# Patient Record
Sex: FEMALE | Race: WHITE | NOT HISPANIC OR LATINO | Employment: UNEMPLOYED | URBAN - METROPOLITAN AREA
[De-identification: names, ages, dates, MRNs, and addresses within clinical notes are randomized per-mention and may not be internally consistent; named-entity substitution may affect disease eponyms.]

---

## 2019-03-11 RX ORDER — POTASSIUM CHLORIDE 750 MG/1
TABLET, EXTENDED RELEASE ORAL
Qty: 30 TABLET | Refills: 0 | OUTPATIENT
Start: 2019-03-11

## 2019-05-28 ENCOUNTER — OFFICE VISIT (OUTPATIENT)
Dept: OBSTETRICS AND GYNECOLOGY | Facility: CLINIC | Age: 34
End: 2019-05-28
Payer: MEDICAID

## 2019-05-28 VITALS
HEART RATE: 57 BPM | HEIGHT: 64 IN | SYSTOLIC BLOOD PRESSURE: 136 MMHG | WEIGHT: 115 LBS | BODY MASS INDEX: 19.63 KG/M2 | DIASTOLIC BLOOD PRESSURE: 84 MMHG

## 2019-05-28 DIAGNOSIS — Z01.419 WOMEN'S ANNUAL ROUTINE GYNECOLOGICAL EXAMINATION: ICD-10-CM

## 2019-05-28 DIAGNOSIS — N83.209 CYST OF OVARY, UNSPECIFIED LATERALITY: Primary | ICD-10-CM

## 2019-05-28 DIAGNOSIS — R10.9 ABDOMINAL PAIN, UNSPECIFIED ABDOMINAL LOCATION: ICD-10-CM

## 2019-05-28 PROCEDURE — 99204 OFFICE O/P NEW MOD 45 MIN: CPT | Mod: S$GLB,,, | Performed by: OBSTETRICS & GYNECOLOGY

## 2019-05-28 PROCEDURE — 99204 PR OFFICE/OUTPT VISIT, NEW, LEVL IV, 45-59 MIN: ICD-10-PCS | Mod: S$GLB,,, | Performed by: OBSTETRICS & GYNECOLOGY

## 2019-05-28 RX ORDER — BUSPIRONE HYDROCHLORIDE 15 MG/1
TABLET ORAL
COMMUNITY
End: 2020-12-23

## 2019-05-28 RX ORDER — DEXTROAMPHETAMINE SACCHARATE, AMPHETAMINE ASPARTATE, DEXTROAMPHETAMINE SULFATE AND AMPHETAMINE SULFATE 7.5; 7.5; 7.5; 7.5 MG/1; MG/1; MG/1; MG/1
TABLET ORAL
COMMUNITY
End: 2021-01-21

## 2019-05-28 NOTE — PROGRESS NOTES
Subjective:       Patient ID: Sari Fernandez is a 33 y.o. female.    Chief Complaint:  Pelvic Pain      History of Present Illness    Seen in ER for abd pain.  She thought it was kidney stone again.  8 cm cyst on CT- complex.  No other complaitns.  Has not been to GYN in some time      GYN & OB History  No LMP recorded (lmp unknown). (Menstrual status: Other).   Date of Last Pap: No result found    OB History    Para Term  AB Living   1         1   SAB TAB Ectopic Multiple Live Births                  # Outcome Date GA Lbr Leonard/2nd Weight Sex Delivery Anes PTL Lv   1       CS-LTranv          Review of Systems  Review of Systems   Constitutional: Negative for chills and fever.   Respiratory: Negative for shortness of breath.    Cardiovascular: Negative for chest pain.   Gastrointestinal: Positive for abdominal pain. Negative for blood in stool, constipation, diarrhea, nausea, vomiting and reflux.   Genitourinary: Negative for dysmenorrhea, dyspareunia, dysuria, hematuria, hot flashes, menorrhagia, menstrual problem, pelvic pain, vaginal bleeding, vaginal discharge, postcoital bleeding and vaginal dryness.   Musculoskeletal: Negative for arthralgias and joint swelling.   Integumentary:  Negative for rash and hair changes.   Psychiatric/Behavioral: Negative for depression. The patient is not nervous/anxious.            Objective:    Physical Exam:   Constitutional: She appears well-developed and well-nourished. No distress.    HENT:   Head: Normocephalic and atraumatic.    Eyes: Conjunctivae and EOM are normal.    Neck: No tracheal deviation present. No thyromegaly present.    Cardiovascular: Exam reveals no clubbing, no cyanosis and no edema.     Pulmonary/Chest: Effort normal. No respiratory distress.        Abdominal: Soft. She exhibits no distension and no mass. There is no tenderness. There is no rebound and no guarding. No hernia.     Genitourinary: Rectum normal, vagina normal and uterus  normal. Pelvic exam was performed with patient supine. There is no rash, tenderness, lesion or injury on the right labia. There is no rash, tenderness, lesion or injury on the left labia. Cervix is normal. Right adnexum displays mass and fullness. Right adnexum displays no tenderness. Left adnexum displays no mass, no tenderness and no fullness.                Skin: She is not diaphoretic. No cyanosis. Nails show no clubbing.           Assessment:        1. Cyst of ovary, unspecified laterality    2. Abdominal pain, unspecified abdominal location               Plan:      US asap

## 2019-05-30 ENCOUNTER — PROCEDURE VISIT (OUTPATIENT)
Dept: OBSTETRICS AND GYNECOLOGY | Facility: CLINIC | Age: 34
End: 2019-05-30
Payer: MEDICAID

## 2019-05-30 DIAGNOSIS — N83.209 CYST OF OVARY, UNSPECIFIED LATERALITY: Primary | ICD-10-CM

## 2019-05-30 DIAGNOSIS — R10.30 LOWER ABDOMINAL PAIN: Primary | ICD-10-CM

## 2019-05-30 PROCEDURE — 76830 TRANSVAGINAL US NON-OB: CPT | Mod: S$GLB,,, | Performed by: OBSTETRICS & GYNECOLOGY

## 2019-05-30 PROCEDURE — 76830 PR  ECHOGRAPHY,TRANSVAGINAL: ICD-10-PCS | Mod: S$GLB,,, | Performed by: OBSTETRICS & GYNECOLOGY

## 2019-06-03 LAB
CLINICAL INFO: NORMAL
COMMENT: NORMAL
CYTO CVX: NORMAL
CYTOLOGIST CVX/VAG CYTO: NORMAL
CYTOLOGY CMNT CVX/VAG CYTO-IMP: NORMAL
DATE OF PREVIOUS PAP: NORMAL
DATE PREVIOUS BX: NORMAL
LMP START DATE: NORMAL
SPECIMEN SOURCE CVX/VAG CYTO: NORMAL
STAT OF ADQ CVX/VAG CYTO-IMP: NORMAL

## 2020-03-27 ENCOUNTER — HOSPITAL ENCOUNTER (OUTPATIENT)
Dept: RADIOLOGY | Facility: CLINIC | Age: 35
Discharge: HOME | End: 2020-03-27
Attending: UROLOGY
Payer: MEDICAID

## 2020-03-27 ENCOUNTER — OFFICE VISIT (OUTPATIENT)
Dept: UROLOGY | Facility: CLINIC | Age: 35
End: 2020-03-27
Payer: MEDICAID

## 2020-03-27 VITALS
RESPIRATION RATE: 18 BRPM | HEART RATE: 104 BPM | SYSTOLIC BLOOD PRESSURE: 125 MMHG | WEIGHT: 115 LBS | DIASTOLIC BLOOD PRESSURE: 74 MMHG | HEIGHT: 64 IN | BODY MASS INDEX: 19.63 KG/M2

## 2020-03-27 DIAGNOSIS — N20.0 RENAL STONE: Primary | ICD-10-CM

## 2020-03-27 DIAGNOSIS — N20.0 RENAL STONE: ICD-10-CM

## 2020-03-27 DIAGNOSIS — N39.0 URINARY TRACT INFECTION WITHOUT HEMATURIA, SITE UNSPECIFIED: ICD-10-CM

## 2020-03-27 LAB
BILIRUB UR QL STRIP: NEGATIVE
GLUCOSE UR QL STRIP: NEGATIVE
KETONES UR QL STRIP: NEGATIVE
LEUKOCYTE ESTERASE UR QL STRIP: POSITIVE
PH, POC UA: 6.5
POC AMORP, URINE: ABNORMAL
POC BACTI, URINE: ABNORMAL
POC BLOOD, URINE: POSITIVE
POC CASTS, URINE: ABNORMAL
POC CRYST, URINE: ABNORMAL
POC EPITH, URINE: ABNORMAL
POC HCG, URINE: ABNORMAL
POC HYALIN, URINE: ABNORMAL
POC MUCUS, URINE: ABNORMAL
POC NITRATES, URINE: NEGATIVE
POC OTHER, URINE: ABNORMAL
POC RBC, URINE: ABNORMAL HPF
POC WBC, URINE: ABNORMAL HPF
PROT UR QL STRIP: POSITIVE
SP GR UR STRIP: 1.02 (ref 1–1.03)
UROBILINOGEN UR STRIP-ACNC: 0.2 (ref 0.1–1.1)

## 2020-03-27 PROCEDURE — 74018 RADEX ABDOMEN 1 VIEW: CPT | Mod: 26,,, | Performed by: RADIOLOGY

## 2020-03-27 PROCEDURE — 99214 OFFICE O/P EST MOD 30 MIN: CPT | Mod: 25,S$GLB,, | Performed by: NURSE PRACTITIONER

## 2020-03-27 PROCEDURE — 74018 RADEX ABDOMEN 1 VIEW: CPT | Mod: TC,,, | Performed by: UROLOGY

## 2020-03-27 PROCEDURE — 99214 PR OFFICE/OUTPT VISIT, EST, LEVL IV, 30-39 MIN: ICD-10-PCS | Mod: 25,S$GLB,, | Performed by: NURSE PRACTITIONER

## 2020-03-27 PROCEDURE — 74018 XR ABDOMEN AP 1 VIEW: ICD-10-PCS | Mod: TC,,, | Performed by: UROLOGY

## 2020-03-27 PROCEDURE — 74018 XR ABDOMEN AP 1 VIEW: ICD-10-PCS | Mod: 26,,, | Performed by: RADIOLOGY

## 2020-03-27 RX ORDER — NITROFURANTOIN (MACROCRYSTALS) 100 MG/1
100 CAPSULE ORAL EVERY 12 HOURS
Qty: 14 CAPSULE | Refills: 0 | Status: SHIPPED | OUTPATIENT
Start: 2020-03-27 | End: 2020-04-03

## 2020-03-27 RX ORDER — ALPRAZOLAM 1 MG/1
TABLET ORAL
COMMUNITY
Start: 2020-03-09 | End: 2021-01-21

## 2020-03-27 NOTE — PROGRESS NOTES
Subjective:       Patient ID: Sari Fernandez is a 34 y.o. female.    Chief Complaint: Nephrolithiasis and Other (History recurrent Urinary Tract Infections)      HPI: 34-year-old female known to the service Dr. Johanny obrien with a history of recurring urinary tract infections and kidney stones.  Follow-up today from her most previous office visit 2019.  Today she reports some low back discomfort and nocturia x3 last p.m..  She states he is usually the symptoms of either stone moving or urinary tract infection.  She has been afebrile.  She denies dysuria, gross hematuria or flank pain.  No constipation.       Past Medical History:   Past Medical History:   Diagnosis Date    ADHD     ADHD     Anemia     Anxiety     Anxiety     Radha        Past Surgical Historical:   Past Surgical History:   Procedure Laterality Date     SECTION, LOW TRANSVERSE      KIDNEY STONE SURGERY      KIDNEY SURGERY      stint        Medications:   Medication List with Changes/Refills   Current Medications    ALPRAZOLAM (XANAX) 1 MG TABLET    TK 1 T PO Q 6 H PRA    BUSPIRONE (BUSPAR) 15 MG TABLET    buspirone 15 mg tablet   TAKE 1 TABLET BY MOUTH TWICE DAILY    DEXTROAMPHETAMINE-AMPHETAMINE (ADDERALL) 30 MG TAB    Adderall 30 mg tablet   Take 1 tablet twice a day by oral route.        Past Social History:   Social History     Socioeconomic History    Marital status: Single     Spouse name: Not on file    Number of children: Not on file    Years of education: Not on file    Highest education level: Not on file   Occupational History    Not on file   Social Needs    Financial resource strain: Not on file    Food insecurity:     Worry: Not on file     Inability: Not on file    Transportation needs:     Medical: Not on file     Non-medical: Not on file   Tobacco Use    Smoking status: Current Some Day Smoker     Types: Cigarettes   Substance and Sexual Activity    Alcohol use: Yes    Drug use: Not Currently     Sexual activity: Yes     Partners: Male   Lifestyle    Physical activity:     Days per week: Not on file     Minutes per session: Not on file    Stress: Not on file   Relationships    Social connections:     Talks on phone: Not on file     Gets together: Not on file     Attends [employer] service: Not on file     Active member of club or organization: Not on file     Attends meetings of clubs or organizations: Not on file     Relationship status: Not on file   Other Topics Concern    Not on file   Social History Narrative    Not on file       Allergies: Review of patient's allergies indicates:  No Known Allergies     Family History:   Family History   Problem Relation Age of Onset    Cancer Paternal Grandmother     Cancer Father         Review of Systems:  Review of Systems   Constitutional: Negative for activity change and appetite change.   HENT: Negative for congestion and dental problem.    Respiratory: Negative for chest tightness and shortness of breath.    Cardiovascular: Negative for chest pain.   Gastrointestinal: Negative for abdominal distention and abdominal pain.   Genitourinary: Positive for frequency (Nocturia x3 last p.m.). Negative for decreased urine volume, difficulty urinating, dyspareunia, dysuria, enuresis, flank pain, genital sores, hematuria, pelvic pain and urgency.   Musculoskeletal: Positive for back pain. Negative for neck pain.   Allergic/Immunologic: Negative for immunocompromised state.   Neurological: Negative for dizziness.   Hematological: Negative for adenopathy.   Psychiatric/Behavioral: Negative for agitation, behavioral problems and confusion.       Physical Exam:  Physical Exam   Constitutional: She is oriented to person, place, and time. She appears well-developed and well-nourished.   HENT:   Head: Normocephalic and atraumatic.   Eyes: No scleral icterus.   Neck: Normal range of motion.   Cardiovascular: Intact distal pulses.    Pulmonary/Chest: Effort normal and breath  sounds normal.   Abdominal: Soft. She exhibits no distension. There is no tenderness.   Genitourinary: Rectum normal and vagina normal. Pelvic exam was performed with patient supine. There is no rash, tenderness or lesion on the right labia. There is no rash, tenderness or lesion on the left labia.   Musculoskeletal: She exhibits no edema.   Neurological: She is alert and oriented to person, place, and time.   Skin: Skin is warm and dry.     Psychiatric: She has a normal mood and affect.       Assessment/Plan:   Low back pain--urinalysis WBC 50-60 RBC 2-3 1+ bacteria, nitrite negative, trace of protein.  Culture urine.  We are weekend, start Macrobid pending culture results and sensitivities Monday next week.  Bilateral renal calculi--by history, KUB-- no obvious stones are seen, but we have a large volume of stool and bowel gas overlying the structures making visualization difficult.  She was post do a 24 hr urine in the past a was never explained her so therefore she did not complete but would like to do so at this time.  Therefore 24 hr urine stone risk analysis ordered      Problem List Items Addressed This Visit     None      Visit Diagnoses     Renal stone    -  Primary    Relevant Orders    POCT Urinalysis (w/Micro Option)    X-Ray Abdomen AP 1 View

## 2020-03-29 LAB — URINE CULTURE, ROUTINE: NORMAL

## 2020-04-03 ENCOUNTER — OFFICE VISIT (OUTPATIENT)
Dept: UROLOGY | Facility: CLINIC | Age: 35
End: 2020-04-03
Payer: MEDICAID

## 2020-04-03 VITALS
HEIGHT: 64 IN | WEIGHT: 115 LBS | DIASTOLIC BLOOD PRESSURE: 66 MMHG | SYSTOLIC BLOOD PRESSURE: 112 MMHG | RESPIRATION RATE: 18 BRPM | BODY MASS INDEX: 19.63 KG/M2 | HEART RATE: 105 BPM

## 2020-04-03 DIAGNOSIS — N39.0 URINARY TRACT INFECTION WITHOUT HEMATURIA, SITE UNSPECIFIED: Primary | ICD-10-CM

## 2020-04-03 LAB
BILIRUB UR QL STRIP: NEGATIVE
GLUCOSE UR QL STRIP: NEGATIVE
KETONES UR QL STRIP: POSITIVE
LEUKOCYTE ESTERASE UR QL STRIP: POSITIVE
PH, POC UA: 6.5
POC AMORP, URINE: ABNORMAL
POC BACTI, URINE: ABNORMAL
POC BLOOD, URINE: POSITIVE
POC CASTS, URINE: ABNORMAL
POC CRYST, URINE: ABNORMAL
POC EPITH, URINE: ABNORMAL
POC HCG, URINE: ABNORMAL
POC HYALIN, URINE: ABNORMAL LPF
POC MUCUS, URINE: ABNORMAL
POC NITRATES, URINE: NEGATIVE
POC OTHER, URINE: ABNORMAL
POC RBC, URINE: ABNORMAL HPF
POC WBC, URINE: ABNORMAL HPF
PROT UR QL STRIP: POSITIVE
SP GR UR STRIP: 1.02 (ref 1–1.03)
UROBILINOGEN UR STRIP-ACNC: 0.2 (ref 0.1–1.1)

## 2020-04-03 PROCEDURE — 99214 PR OFFICE/OUTPT VISIT, EST, LEVL IV, 30-39 MIN: ICD-10-PCS | Mod: S$GLB,,, | Performed by: UROLOGY

## 2020-04-03 PROCEDURE — 51701 INSERT BLADDER CATHETER: CPT | Mod: S$GLB,,, | Performed by: NURSE PRACTITIONER

## 2020-04-03 PROCEDURE — 51701 PR INSERTION OF NON-INDWELLING BLADDER CATHETERIZATION FOR RESIDUAL UR: ICD-10-PCS | Mod: S$GLB,,, | Performed by: NURSE PRACTITIONER

## 2020-04-03 PROCEDURE — 99214 OFFICE O/P EST MOD 30 MIN: CPT | Mod: S$GLB,,, | Performed by: UROLOGY

## 2020-04-03 RX ORDER — MELOXICAM 7.5 MG/1
7.5 TABLET ORAL DAILY
Qty: 5 TABLET | Refills: 0 | Status: SHIPPED | OUTPATIENT
Start: 2020-04-03 | End: 2020-04-08

## 2020-04-03 NOTE — PROGRESS NOTES
Subjective:       Patient ID: Sari Fernandez is a 34 y.o. female.    Chief Complaint: Other (c/o low back pain and left side)      HPI: 34-year-old female known service Dr. Johanny obrien returns to clinic she was seen recently diagnosed urinary tract infection E coli 25,000, any count and treated based on sensitivities.  She returns today complaining of left-sided pain from the shoulder down through the muscles of her lower back ( reached out to PCP, no response).  She states she strained moving an object and suspect she pulled a muscle, but became concerned that it may be something with her kidney.  She completed her antibiotics as prescribed, she has has no voiding symptoms and denies left flank pain.  Her pain is aggravated with movement.       Past Medical History:   Past Medical History:   Diagnosis Date    ADHD     ADHD     Anemia     Anxiety     Anxiety     Radha     Urinary tract infection        Past Surgical Historical:   Past Surgical History:   Procedure Laterality Date     SECTION, LOW TRANSVERSE      KIDNEY STONE SURGERY      KIDNEY SURGERY      stint        Medications:   Medication List with Changes/Refills   Current Medications    ALPRAZOLAM (XANAX) 1 MG TABLET    TK 1 T PO Q 6 H PRA    BUSPIRONE (BUSPAR) 15 MG TABLET    buspirone 15 mg tablet   TAKE 1 TABLET BY MOUTH TWICE DAILY    DEXTROAMPHETAMINE-AMPHETAMINE (ADDERALL) 30 MG TAB    Adderall 30 mg tablet   Take 1 tablet twice a day by oral route.    NITROFURANTOIN (MACRODANTIN) 100 MG CAPSULE    Take 1 capsule (100 mg total) by mouth every 12 (twelve) hours. for 7 days        Past Social History:   Social History     Socioeconomic History    Marital status: Single     Spouse name: Not on file    Number of children: Not on file    Years of education: Not on file    Highest education level: Not on file   Occupational History    Not on file   Social Needs    Financial resource strain: Not on file    Food insecurity:      Worry: Not on file     Inability: Not on file    Transportation needs:     Medical: Not on file     Non-medical: Not on file   Tobacco Use    Smoking status: Current Some Day Smoker     Types: Cigarettes    Smokeless tobacco: Never Used   Substance and Sexual Activity    Alcohol use: Yes    Drug use: Not Currently    Sexual activity: Yes     Partners: Male   Lifestyle    Physical activity:     Days per week: Not on file     Minutes per session: Not on file    Stress: Not on file   Relationships    Social connections:     Talks on phone: Not on file     Gets together: Not on file     Attends Voodoo service: Not on file     Active member of club or organization: Not on file     Attends meetings of clubs or organizations: Not on file     Relationship status: Not on file   Other Topics Concern    Not on file   Social History Narrative    Not on file       Allergies: Review of patient's allergies indicates:  No Known Allergies     Family History:   Family History   Problem Relation Age of Onset    Cancer Paternal Grandmother     Cancer Father         Review of Systems:  Review of Systems   Constitutional: Negative for activity change and appetite change.   HENT: Negative for congestion and dental problem.    Respiratory: Negative for chest tightness and shortness of breath.    Cardiovascular: Negative for chest pain.   Gastrointestinal: Negative for abdominal distention and abdominal pain.   Genitourinary: Negative for decreased urine volume, difficulty urinating, dyspareunia, dysuria, enuresis, flank pain, frequency, genital sores, hematuria, pelvic pain and urgency.   Musculoskeletal: Negative for back pain and neck pain.   Allergic/Immunologic: Negative for immunocompromised state.   Neurological: Negative for dizziness.   Hematological: Negative for adenopathy.   Psychiatric/Behavioral: Negative for agitation, behavioral problems and confusion.       Physical Exam:  Physical Exam   Constitutional: She  is oriented to person, place, and time. She appears well-developed and well-nourished.   HENT:   Head: Normocephalic and atraumatic.   Eyes: No scleral icterus.   Neck: Normal range of motion.   Cardiovascular: Intact distal pulses.    Pulmonary/Chest: Effort normal and breath sounds normal.   Abdominal: Soft. She exhibits no distension. There is no tenderness.   Genitourinary: Rectum normal and vagina normal. Pelvic exam was performed with patient supine. There is no rash, tenderness or lesion on the right labia. There is no rash, tenderness or lesion on the left labia.   Musculoskeletal: She exhibits no edema.        Arms:  Paraspinous point muscle tenderness thoracic and lumbar (recreates pain she complains of)   Neurological: She is alert and oriented to person, place, and time.   Skin: Skin is warm and dry.     Psychiatric: She has a normal mood and affect.       Assessment/Plan:   UTI-positive urine culture E coli previous visit, voided urine today WBC 50-75, 0-3 RBCs, 1+ epithelial and 2+ bacteria; cath UA WBC 25-50, 3-5 red cells per high-power field, 1+ epithelials, 2+ bacteria nitrite negative (patient is on menses); no voiding symptoms will culture urine and treat if indicated    Paraspinous muscle strain--Mobic 7.5 mg, #5., 1 p.o. daily with meal, no refill follow-up with PCP as needed if not better or worsening  Problem List Items Addressed This Visit     None      Visit Diagnoses     Urinary tract infection without hematuria, site unspecified    -  Primary    Relevant Orders    POCT Urinalysis (w/Micro Option)

## 2020-04-05 LAB — URINE CULTURE, ROUTINE: NORMAL

## 2020-04-06 ENCOUNTER — TELEPHONE (OUTPATIENT)
Dept: UROLOGY | Facility: CLINIC | Age: 35
End: 2020-04-06

## 2020-04-06 NOTE — TELEPHONE ENCOUNTER
----- Message from Miguelangel Gallego NP sent at 4/6/2020  8:50 AM CDT -----  Please notify patient of lab results within stable range

## 2020-06-17 ENCOUNTER — OFFICE VISIT (OUTPATIENT)
Dept: UROLOGY | Facility: CLINIC | Age: 35
End: 2020-06-17
Payer: MEDICAID

## 2020-06-17 VITALS
DIASTOLIC BLOOD PRESSURE: 89 MMHG | SYSTOLIC BLOOD PRESSURE: 150 MMHG | HEIGHT: 64 IN | BODY MASS INDEX: 21.34 KG/M2 | WEIGHT: 125 LBS | OXYGEN SATURATION: 100 % | RESPIRATION RATE: 20 BRPM | HEART RATE: 89 BPM

## 2020-06-17 DIAGNOSIS — N39.0 FREQUENT UTI: ICD-10-CM

## 2020-06-17 DIAGNOSIS — N20.0 KIDNEY STONE: Primary | ICD-10-CM

## 2020-06-17 LAB
BILIRUB UR QL STRIP: NEGATIVE
CLARITY, POC UA: CLEAR
COLOR, POC UA: YELLOW
GLUCOSE UR QL STRIP: NEGATIVE
KETONES UR QL STRIP: NEGATIVE
LEUKOCYTE ESTERASE UR QL STRIP: POSITIVE
NITRITE, POC UA: ABNORMAL
PH, POC UA: 6
POC AMORP, URINE: ABNORMAL
POC BACTI, URINE: ABNORMAL
POC BLOOD, URINE: POSITIVE
POC CASTS, URINE: ABNORMAL
POC CRYST, URINE: ABNORMAL
POC EPITH, URINE: ABNORMAL
POC HCG, URINE: ABNORMAL
POC HYALIN, URINE: ABNORMAL LPF
POC MUCUS, URINE: ABNORMAL
POC NITRATES, URINE: NEGATIVE
POC OTHER, URINE: ABNORMAL
POC RBC, URINE: ABNORMAL HPF
POC WBC, URINE: ABNORMAL HPF
PROT UR QL STRIP: POSITIVE
SP GR UR STRIP: 1.02 (ref 1–1.03)
UROBILINOGEN UR STRIP-ACNC: 0.2 (ref 0.1–1.1)

## 2020-06-17 PROCEDURE — 99213 OFFICE O/P EST LOW 20 MIN: CPT | Mod: S$GLB,,, | Performed by: UROLOGY

## 2020-06-17 PROCEDURE — 99213 PR OFFICE/OUTPT VISIT, EST, LEVL III, 20-29 MIN: ICD-10-PCS | Mod: S$GLB,,, | Performed by: UROLOGY

## 2020-06-17 NOTE — PROGRESS NOTES
Subjective:       Patient ID: Sari Fernandez is a 34 y.o. female.    Chief Complaint: Follow-up (6 week follow up for uti pt states she have a hx of stone and have been having back pain last couple days)      HPI: 34-year-old female, patient Dr. Orlando, presents for 6 week evaluation.  Patient has a history of kidney stones.  Patient was to do a 24 hr urine.  However patient states she had lost paperwork.  Patient also has a history of frequent UTIs.  She was treated for urinary tract infection with E coli approximately 3 months ago.  Last urine culture 2 months ago showed no growth.    At this time patient denies any pain or burning urination.  Denies any difficulty voiding.  Denies any flank pain.  Denies any blood in urine.  States he has a good stream.  Denies any odor to her urine.  Denies any fever.  No other urinary complaints.  All other health problems appear stable at this time.       Past Medical History:   Past Medical History:   Diagnosis Date    ADHD     ADHD     Anemia     Anxiety     Anxiety     Radha     Urinary tract infection        Past Surgical Historical:   Past Surgical History:   Procedure Laterality Date     SECTION, LOW TRANSVERSE      KIDNEY STONE SURGERY      KIDNEY SURGERY      stint        Medications:   Medication List with Changes/Refills   Current Medications    ALPRAZOLAM (XANAX) 1 MG TABLET    TK 1 T PO Q 6 H PRA    BUSPIRONE (BUSPAR) 15 MG TABLET    buspirone 15 mg tablet   TAKE 1 TABLET BY MOUTH TWICE DAILY    DEXTROAMPHETAMINE-AMPHETAMINE (ADDERALL) 30 MG TAB    Adderall 30 mg tablet   Take 1 tablet twice a day by oral route.        Past Social History:   Social History     Socioeconomic History    Marital status: Single     Spouse name: Not on file    Number of children: Not on file    Years of education: Not on file    Highest education level: Not on file   Occupational History    Not on file   Social Needs    Financial resource strain: Not on file     Food insecurity     Worry: Not on file     Inability: Not on file    Transportation needs     Medical: Not on file     Non-medical: Not on file   Tobacco Use    Smoking status: Current Some Day Smoker     Types: Cigarettes    Smokeless tobacco: Never Used   Substance and Sexual Activity    Alcohol use: Yes    Drug use: Not Currently    Sexual activity: Yes     Partners: Male   Lifestyle    Physical activity     Days per week: Not on file     Minutes per session: Not on file    Stress: Not on file   Relationships    Social connections     Talks on phone: Not on file     Gets together: Not on file     Attends [employer] service: Not on file     Active member of club or organization: Not on file     Attends meetings of clubs or organizations: Not on file     Relationship status: Not on file   Other Topics Concern    Not on file   Social History Narrative    Not on file       Allergies: Review of patient's allergies indicates:  No Known Allergies     Family History:   Family History   Problem Relation Age of Onset    Cancer Paternal Grandmother     Cancer Father         Review of Systems:  Review of Systems   Constitutional: Negative for activity change and appetite change.   HENT: Negative for congestion and dental problem.    Respiratory: Negative for chest tightness and shortness of breath.    Cardiovascular: Negative for chest pain.   Gastrointestinal: Negative for abdominal distention.   Genitourinary: Negative for decreased urine volume, difficulty urinating, dyspareunia, dysuria, enuresis, flank pain, frequency, genital sores, hematuria, pelvic pain and urgency.   Musculoskeletal: Negative for back pain and neck pain.   Allergic/Immunologic: Negative for immunocompromised state.   Neurological: Negative for dizziness.   Hematological: Negative for adenopathy.   Psychiatric/Behavioral: Negative for agitation, behavioral problems and confusion.       Physical Exam:  Physical Exam   Nursing note and  vitals reviewed.  Constitutional: She is oriented to person, place, and time. She appears well-developed.   HENT:   Head: Normocephalic.   Cardiovascular: Normal rate, regular rhythm and normal heart sounds.    Pulmonary/Chest: Effort normal and breath sounds normal.   Abdominal: Soft. Bowel sounds are normal.   Neurological: She is alert and oriented to person, place, and time.   Skin: Skin is warm and dry.      Urinalysis:  Trace leukocytes, white blood cells 0-5, epithelial +1, bacteria +1.  Trace intact blood, red blood cell 6 in 10.    Assessment/Plan:   1.  Kidney stones:  Patient is doing at this time.  No complaints.  Will reorder patient's 24 hr urinalysis.    2.  Frequent UTIs:  Patient is asymptomatic this time.  Her urinalysis relatively normal.    Plan follow-up in approximately 6 weeks, sooner if needed.  Problem List Items Addressed This Visit     None      Visit Diagnoses     Kidney stone    -  Primary    Frequent UTI        Relevant Orders    POCT Urinalysis (w/Micro Option)

## 2020-06-24 ENCOUNTER — TELEPHONE (OUTPATIENT)
Dept: UROLOGY | Facility: CLINIC | Age: 35
End: 2020-06-24

## 2020-06-24 NOTE — TELEPHONE ENCOUNTER
----- Message from Caitlin Wilde sent at 6/24/2020  2:38 PM CDT -----  Regarding: pt  Pt would like to consult with nurse in regards to scheduling an appt. Pt states that she feels she need to be seen today or tomorrow. Please call back at [phone] or [phone]

## 2020-06-26 ENCOUNTER — HOSPITAL ENCOUNTER (OUTPATIENT)
Dept: RADIOLOGY | Facility: CLINIC | Age: 35
Discharge: HOME | End: 2020-06-26
Attending: UROLOGY
Payer: MEDICAID

## 2020-06-26 ENCOUNTER — OFFICE VISIT (OUTPATIENT)
Dept: UROLOGY | Facility: CLINIC | Age: 35
End: 2020-06-26
Payer: MEDICAID

## 2020-06-26 DIAGNOSIS — N20.0 KIDNEY STONE: ICD-10-CM

## 2020-06-26 DIAGNOSIS — N20.0 KIDNEY STONE: Primary | ICD-10-CM

## 2020-06-26 LAB
BILIRUB UR QL STRIP: NEGATIVE
CLARITY, POC UA: ABNORMAL
COLOR, POC UA: ABNORMAL
GLUCOSE UR QL STRIP: NEGATIVE
KETONES UR QL STRIP: NEGATIVE
LEUKOCYTE ESTERASE UR QL STRIP: NEGATIVE
NITRITE, POC UA: ABNORMAL
PH, POC UA: 7
POC AMORP, URINE: ABNORMAL
POC BACTI, URINE: ABNORMAL
POC BLOOD, URINE: POSITIVE
POC CASTS, URINE: ABNORMAL
POC CRYST, URINE: ABNORMAL
POC EPITH, URINE: ABNORMAL
POC HCG, URINE: ABNORMAL
POC HYALIN, URINE: ABNORMAL LPF
POC MUCUS, URINE: ABNORMAL
POC NITRATES, URINE: NEGATIVE
POC OTHER, URINE: ABNORMAL
POC RBC, URINE: ABNORMAL HPF
POC WBC, URINE: ABNORMAL HPF
PROT UR QL STRIP: NEGATIVE
SP GR UR STRIP: 1.02 (ref 1–1.03)
UROBILINOGEN UR STRIP-ACNC: 0.2 (ref 0.1–1.1)

## 2020-06-26 PROCEDURE — 99214 PR OFFICE/OUTPT VISIT, EST, LEVL IV, 30-39 MIN: ICD-10-PCS | Mod: 25,S$GLB,, | Performed by: UROLOGY

## 2020-06-26 PROCEDURE — 74018 RADEX ABDOMEN 1 VIEW: CPT | Mod: TC,,, | Performed by: UROLOGY

## 2020-06-26 PROCEDURE — 99214 OFFICE O/P EST MOD 30 MIN: CPT | Mod: 25,S$GLB,, | Performed by: UROLOGY

## 2020-06-26 PROCEDURE — 74018 XR ABDOMEN AP 1 VIEW: ICD-10-PCS | Mod: 26,,, | Performed by: RADIOLOGY

## 2020-06-26 PROCEDURE — 74018 XR ABDOMEN AP 1 VIEW: ICD-10-PCS | Mod: TC,,, | Performed by: UROLOGY

## 2020-06-26 PROCEDURE — 74018 RADEX ABDOMEN 1 VIEW: CPT | Mod: 26,,, | Performed by: RADIOLOGY

## 2020-06-26 NOTE — PROGRESS NOTES
Subjective:       Patient ID: Sari Fernandez is a 34 y.o. female.    Chief Complaint: Flank Pain (right )      HPI: 33 yo female fu had another episode of renal colic which resolved 48 hrs ago.  Pt has a history of stones.  Pt has yet to do her 24 hr urine.  Pt denies any flank pain or hematuria       Past Medical History:   Past Medical History:   Diagnosis Date    ADHD     ADHD     Anemia     Anxiety     Anxiety     Radha     Urinary tract infection        Past Surgical Historical:   Past Surgical History:   Procedure Laterality Date     SECTION, LOW TRANSVERSE      KIDNEY STONE SURGERY      KIDNEY SURGERY      stint        Medications:   Medication List with Changes/Refills   Current Medications    ALPRAZOLAM (XANAX) 1 MG TABLET    TK 1 T PO Q 6 H PRA    BUSPIRONE (BUSPAR) 15 MG TABLET    buspirone 15 mg tablet   TAKE 1 TABLET BY MOUTH TWICE DAILY    DEXTROAMPHETAMINE-AMPHETAMINE (ADDERALL) 30 MG TAB    Adderall 30 mg tablet   Take 1 tablet twice a day by oral route.        Past Social History:   Social History     Socioeconomic History    Marital status: Single     Spouse name: Not on file    Number of children: Not on file    Years of education: Not on file    Highest education level: Not on file   Occupational History    Not on file   Social Needs    Financial resource strain: Not on file    Food insecurity     Worry: Not on file     Inability: Not on file    Transportation needs     Medical: Not on file     Non-medical: Not on file   Tobacco Use    Smoking status: Current Some Day Smoker     Types: Cigarettes    Smokeless tobacco: Never Used   Substance and Sexual Activity    Alcohol use: Yes    Drug use: Not Currently    Sexual activity: Yes     Partners: Male   Lifestyle    Physical activity     Days per week: Not on file     Minutes per session: Not on file    Stress: Not on file   Relationships    Social connections     Talks on phone: Not on file     Gets together:  Not on file     Attends [employer] service: Not on file     Active member of club or organization: Not on file     Attends meetings of clubs or organizations: Not on file     Relationship status: Not on file   Other Topics Concern    Not on file   Social History Narrative    Not on file       Allergies: Review of patient's allergies indicates:  No Known Allergies     Family History:   Family History   Problem Relation Age of Onset    Cancer Paternal Grandmother     Cancer Father         Review of Systems:  Review of Systems   Constitutional: Negative for activity change and appetite change.   HENT: Negative for congestion and dental problem.    Respiratory: Negative for chest tightness and shortness of breath.    Cardiovascular: Negative for chest pain.   Gastrointestinal: Negative for abdominal distention and abdominal pain.   Genitourinary: Negative for decreased urine volume, difficulty urinating, dyspareunia, dysuria, enuresis, flank pain, frequency, genital sores, hematuria, pelvic pain and urgency.   Musculoskeletal: Negative for back pain and neck pain.   Allergic/Immunologic: Negative for immunocompromised state.   Neurological: Negative for dizziness.   Hematological: Negative for adenopathy.   Psychiatric/Behavioral: Negative for agitation, behavioral problems and confusion.       Physical Exam:  Physical Exam   Nursing note and vitals reviewed.  Constitutional: She is oriented to person, place, and time. She appears well-developed.   HENT:   Head: Normocephalic.   Eyes: Pupils are equal, round, and reactive to light.   Neck: Normal range of motion. Neck supple.   Cardiovascular: Normal rate, regular rhythm and normal heart sounds.    Pulmonary/Chest: Effort normal and breath sounds normal.   Abdominal: Soft. Bowel sounds are normal.   Musculoskeletal: Normal range of motion.   Neurological: She is alert and oriented to person, place, and time.   Skin: Skin is warm and dry.     Psychiatric: Her behavior  is normal.   ua is nl  kub--appears normal    Assessment/Plan:     history of stones with recent episode of renal colic resolved.  Pt to do 24 hr urine and fu in 6 weeks  Problem List Items Addressed This Visit     None      Visit Diagnoses     Kidney stone    -  Primary    Relevant Orders    POCT Urinalysis (w/Micro Option)    X-Ray Abdomen AP 1 View

## 2020-12-08 DIAGNOSIS — Z34.81 ENCOUNTER FOR SUPERVISION OF NORMAL PREGNANCY IN MULTIGRAVIDA IN FIRST TRIMESTER: ICD-10-CM

## 2020-12-08 DIAGNOSIS — Z34.81 ENCOUNTER FOR SUPERVISION OF NORMAL PREGNANCY IN MULTIGRAVIDA IN FIRST TRIMESTER: Primary | ICD-10-CM

## 2020-12-08 DIAGNOSIS — Z34.01 ENCOUNTER FOR SUPERVISION OF NORMAL FIRST PREGNANCY IN FIRST TRIMESTER: Primary | ICD-10-CM

## 2020-12-09 ENCOUNTER — TELEPHONE (OUTPATIENT)
Dept: OBSTETRICS AND GYNECOLOGY | Facility: CLINIC | Age: 35
End: 2020-12-09

## 2020-12-09 ENCOUNTER — PROCEDURE VISIT (OUTPATIENT)
Dept: OBSTETRICS AND GYNECOLOGY | Facility: CLINIC | Age: 35
End: 2020-12-09
Payer: MEDICAID

## 2020-12-09 DIAGNOSIS — Z34.81 ENCOUNTER FOR SUPERVISION OF NORMAL PREGNANCY IN MULTIGRAVIDA IN FIRST TRIMESTER: ICD-10-CM

## 2020-12-09 PROCEDURE — 76817 TRANSVAGINAL US OBSTETRIC: CPT | Mod: S$GLB,,, | Performed by: OBSTETRICS & GYNECOLOGY

## 2020-12-09 PROCEDURE — 76817 PR US, OB, TRANSVAG APPROACH: ICD-10-PCS | Mod: S$GLB,,, | Performed by: OBSTETRICS & GYNECOLOGY

## 2020-12-09 NOTE — TELEPHONE ENCOUNTER
Pt came in today for a new OB ultrasound, pt stated that she is taking Xanax 1mg 4 times daily and also Adderall 30mg. I spoke with the pt about her stopping the medications. We talked about her stepping down on the Xanax, she is going to do 2 a day for 2 weeks, then 1 a day for 2 weeks, then 1 every other day for weeks, then stop. Pt stated that she took Adderall this morning but that she will no longer take it after today. She aske dif she can be put on other medications for her anxiety and depression and I told her that she would have to talk with Dr. Larios when she comes back in 1 week for her appointment.

## 2020-12-10 LAB
AMPHETAMINES (500): ABNORMAL NG/ML
BARBITURATES (200): NEGATIVE NG/ML
BENZODIAZEPINES: ABNORMAL NG/ML
COCAINE (150): NEGATIVE NG/ML
MARIJUANA, THC (50): NEGATIVE NG/ML
METHADONE: NEGATIVE NG/ML
OPIATES: NEGATIVE NG/ML
OXYCODONE: NEGATIVE NG/ML
PH: 6.4 (ref 4.5–8)
PHENCYCLIDINE (25): NEGATIVE NG/ML
URINE CREATININE D/S: 134.8 MG/DL

## 2020-12-11 ENCOUNTER — PATIENT MESSAGE (OUTPATIENT)
Dept: OBSTETRICS AND GYNECOLOGY | Facility: CLINIC | Age: 35
End: 2020-12-11

## 2020-12-15 ENCOUNTER — PATIENT MESSAGE (OUTPATIENT)
Dept: OBSTETRICS AND GYNECOLOGY | Facility: CLINIC | Age: 35
End: 2020-12-15

## 2020-12-15 LAB
ABS NRBC COUNT: 0 X 10 3/UL (ref 0–0.01)
ABSOLUTE BASOPHIL: 0.02 X 10 3/UL (ref 0–0.22)
ABSOLUTE EOSINOPHIL: 0.18 X 10 3/UL (ref 0.04–0.54)
ABSOLUTE IMMATURE GRAN: 0.04 X 10 3/UL (ref 0–0.04)
ABSOLUTE LYMPHOCYTE: 1.47 X 10 3/UL (ref 0.86–4.75)
ABSOLUTE MONOCYTE: 0.38 X 10 3/UL (ref 0.22–1.08)
ANTIBODY SCREEN: POSITIVE
BASOPHILS NFR BLD: 0.3 % (ref 0.2–1.2)
BLOOD GROUPING: NORMAL
BLOOD TYPE (D): POSITIVE
EOSINOPHIL NFR BLD: 2.3 % (ref 0.7–7)
HBV SURFACE AG SERPL QL IA: NONREACTIVE
HCT VFR BLD AUTO: 38.6 % (ref 37–47)
HCV IGG SERPL QL IA: NONREACTIVE
HGB BLD-MCNC: 12 G/DL (ref 12–16)
HIV 1+2 AB+HIV1 P24 AG SERPL QL IA: NONREACTIVE
IMMATURE GRANULOCYTES: 0.5 % (ref 0–0.5)
LYMPHOCYTES NFR BLD: 18.7 % (ref 19.3–53.1)
MCH RBC QN AUTO: 25.3 PG (ref 27–32)
MCHC RBC AUTO-ENTMCNC: 31.1 G/DL (ref 32–36)
MCV RBC AUTO: 81.4 FL (ref 82–100)
MONOCYTES NFR BLD: 4.8 % (ref 4.7–12.5)
NEUTROPHILS # BLD AUTO: 5.79 X 10 3/UL (ref 2.15–7.56)
NEUTROPHILS NFR BLD: 73.4 %
NUCLEATED RED BLOOD CELLS: 0 /100 WBC (ref 0–0.2)
PLATELET # BLD AUTO: 454 X 10 3/UL (ref 135–400)
RBC # BLD AUTO: 4.74 X 10 6/UL (ref 4.2–5.4)
RDW-SD: 51.8 FL (ref 37–54)
RUBELLA IGG SCREEN: NORMAL
SICKLE CELL PREP: NEGATIVE
SYPHILIS TREPONEMAL ANTIBODY: NONREACTIVE
WBC # BLD: 7.88 X 10 3/UL (ref 4.3–10.8)

## 2020-12-16 ENCOUNTER — PATIENT MESSAGE (OUTPATIENT)
Dept: OBSTETRICS AND GYNECOLOGY | Facility: CLINIC | Age: 35
End: 2020-12-16

## 2020-12-23 ENCOUNTER — ROUTINE PRENATAL (OUTPATIENT)
Dept: OBSTETRICS AND GYNECOLOGY | Facility: CLINIC | Age: 35
End: 2020-12-23
Payer: MEDICAID

## 2020-12-23 VITALS
SYSTOLIC BLOOD PRESSURE: 134 MMHG | DIASTOLIC BLOOD PRESSURE: 84 MMHG | WEIGHT: 135 LBS | BODY MASS INDEX: 23.17 KG/M2 | HEART RATE: 106 BPM

## 2020-12-23 DIAGNOSIS — Z34.81 ENCOUNTER FOR SUPERVISION OF NORMAL PREGNANCY IN MULTIGRAVIDA IN FIRST TRIMESTER: Primary | ICD-10-CM

## 2020-12-23 DIAGNOSIS — Z98.891 HISTORY OF CESAREAN DELIVERY: ICD-10-CM

## 2020-12-23 DIAGNOSIS — O36.0910 ANTI-E ISOIMMUNIZATION AFFECTING PREGNANCY IN FIRST TRIMESTER, SINGLE OR UNSPECIFIED FETUS: ICD-10-CM

## 2020-12-23 DIAGNOSIS — F41.9 ANXIETY: ICD-10-CM

## 2020-12-23 PROCEDURE — 99204 PR OFFICE/OUTPT VISIT, NEW, LEVL IV, 45-59 MIN: ICD-10-PCS | Mod: TH,S$GLB,, | Performed by: OBSTETRICS & GYNECOLOGY

## 2020-12-23 PROCEDURE — 99204 OFFICE O/P NEW MOD 45 MIN: CPT | Mod: TH,S$GLB,, | Performed by: OBSTETRICS & GYNECOLOGY

## 2020-12-23 RX ORDER — BUSPIRONE HYDROCHLORIDE 5 MG/1
5 TABLET ORAL 2 TIMES DAILY
Qty: 60 TABLET | Refills: 11 | Status: SHIPPED | OUTPATIENT
Start: 2020-12-23 | End: 2021-01-21 | Stop reason: SDUPTHER

## 2020-12-23 RX ORDER — CITALOPRAM 10 MG/1
10 TABLET ORAL DAILY
Qty: 30 TABLET | Refills: 2 | Status: SHIPPED | OUTPATIENT
Start: 2020-12-23 | End: 2021-01-21 | Stop reason: SDUPTHER

## 2020-12-23 RX ORDER — HYDROXYZINE PAMOATE 25 MG/1
25 CAPSULE ORAL EVERY 8 HOURS PRN
Qty: 90 CAPSULE | Refills: 1 | Status: SHIPPED | OUTPATIENT
Start: 2020-12-23 | End: 2021-01-21 | Stop reason: SDUPTHER

## 2020-12-23 NOTE — PROGRESS NOTES
Subjective:       Patient ID: Sari Fernandez is a 35 y.o.  at 8w0d   Chief Complaint:  Initial Prenatal Visit      History of Present Illness  here for new ob exam.  Labs and history were reviewed with the patient today  No complaints      Past Medical History:   Diagnosis Date    ADHD     ADHD     Anemia     Anxiety     Anxiety     Radha     Urinary tract infection        Past Surgical History:   Procedure Laterality Date     SECTION, LOW TRANSVERSE      KIDNEY STONE SURGERY      KIDNEY SURGERY      stint       OB:    OB History    Para Term  AB Living   2 1 1     1   SAB TAB Ectopic Multiple Live Births                  # Outcome Date GA Lbr Leonard/2nd Weight Sex Delivery Anes PTL Lv   2 Current            1 Term              Gyn: no STD, never had abn pap   Meds:   Current Outpatient Medications:     ALPRAZolam (XANAX) 1 MG tablet, TK 1 T PO Q 6 H PRA, Disp: , Rfl:     busPIRone (BUSPAR) 15 MG tablet, buspirone 15 mg tablet  TAKE 1 TABLET BY MOUTH TWICE DAILY, Disp: , Rfl:     dextroamphetamine-amphetamine (ADDERALL) 30 mg Tab, Adderall 30 mg tablet  Take 1 tablet twice a day by oral route., Disp: , Rfl:     All: Review of patient's allergies indicates:  No Known Allergies    SH:   Social History     Tobacco Use    Smoking status: Current Some Day Smoker     Types: Cigarettes    Smokeless tobacco: Never Used   Substance Use Topics    Alcohol use: Yes      FH: family history includes Cancer in her father and paternal grandmother.      Review of Systems  nml 1st trimester sx- sob, dec excercixe tolerance, fatigue and nausea  Neg for vag bleed, dc, vomiting, cp, lof, fever, chills, ns, visual changes, swelling, headaches, constipation/diarrhea, dysuria, freq/urgency of urination     Objective:     Vitals:    20 1329   BP: 134/84   Pulse: 106   Weight: 61.2 kg (135 lb)       NAD  NCAT  pupils normal size  Skin nml no rashes or lesions  No resp distress, resp  even and unlabored  nt nd, no rebound no guarding  nml ext fem gent, normal cvx uterus and adnexa, no dc or bleeding  nml appearing rectum  No cyanosis or clubbing, edema appropriate for pregn    Uterus size approp for gest age         Assessment:        1. Encounter for supervision of normal pregnancy in multigravida in first trimester    2. 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

## 2020-12-25 LAB — URINE CULTURE, ROUTINE: NORMAL

## 2020-12-26 ENCOUNTER — PATIENT MESSAGE (OUTPATIENT)
Dept: OBSTETRICS AND GYNECOLOGY | Facility: CLINIC | Age: 35
End: 2020-12-26

## 2020-12-30 LAB
CHLAMYDIA: NEGATIVE
GONORRHEA: NEGATIVE
SOURCE: NORMAL
SOURCE: NORMAL
TRICHOMONAS AMPLIFIED: NEGATIVE

## 2021-01-19 ENCOUNTER — PATIENT MESSAGE (OUTPATIENT)
Dept: OBSTETRICS AND GYNECOLOGY | Facility: CLINIC | Age: 36
End: 2021-01-19

## 2021-01-20 ENCOUNTER — PATIENT MESSAGE (OUTPATIENT)
Dept: ADMINISTRATIVE | Facility: OTHER | Age: 36
End: 2021-01-20

## 2021-01-20 ENCOUNTER — ROUTINE PRENATAL (OUTPATIENT)
Dept: OBSTETRICS AND GYNECOLOGY | Facility: CLINIC | Age: 36
End: 2021-01-20
Payer: MEDICAID

## 2021-01-20 VITALS
BODY MASS INDEX: 24.37 KG/M2 | SYSTOLIC BLOOD PRESSURE: 144 MMHG | HEART RATE: 103 BPM | WEIGHT: 142 LBS | DIASTOLIC BLOOD PRESSURE: 85 MMHG

## 2021-01-20 DIAGNOSIS — F41.9 ANXIETY: ICD-10-CM

## 2021-01-20 DIAGNOSIS — Z34.81 ENCOUNTER FOR SUPERVISION OF NORMAL PREGNANCY IN MULTIGRAVIDA IN FIRST TRIMESTER: Primary | ICD-10-CM

## 2021-01-20 DIAGNOSIS — Z98.891 HISTORY OF CESAREAN DELIVERY: ICD-10-CM

## 2021-01-20 DIAGNOSIS — O36.0910 ANTI-E ISOIMMUNIZATION AFFECTING PREGNANCY IN FIRST TRIMESTER, SINGLE OR UNSPECIFIED FETUS: ICD-10-CM

## 2021-01-20 LAB
AMPHETAMINES (500): NEGATIVE NG/ML
BARBITURATES (200): NEGATIVE NG/ML
BENZODIAZEPINES: NEGATIVE NG/ML
COCAINE (150): NEGATIVE NG/ML
MARIJUANA, THC (50): NEGATIVE NG/ML
METHADONE: NEGATIVE NG/ML
OPIATES: NEGATIVE NG/ML
OXYCODONE: NEGATIVE NG/ML
PH: 6.6 (ref 4.5–8)
PHENCYCLIDINE (25): NEGATIVE NG/ML
URINE CREATININE D/S: 125.6 MG/DL

## 2021-01-20 PROCEDURE — 99213 OFFICE O/P EST LOW 20 MIN: CPT | Mod: TH,S$GLB,, | Performed by: OBSTETRICS & GYNECOLOGY

## 2021-01-20 PROCEDURE — 99213 PR OFFICE/OUTPT VISIT, EST, LEVL III, 20-29 MIN: ICD-10-PCS | Mod: TH,S$GLB,, | Performed by: OBSTETRICS & GYNECOLOGY

## 2021-01-21 RX ORDER — HYDROXYZINE PAMOATE 25 MG/1
25 CAPSULE ORAL EVERY 8 HOURS PRN
Qty: 90 CAPSULE | Refills: 1 | Status: SHIPPED | OUTPATIENT
Start: 2021-01-21 | End: 2021-02-20

## 2021-01-21 RX ORDER — BUSPIRONE HYDROCHLORIDE 5 MG/1
5 TABLET ORAL 2 TIMES DAILY
Qty: 60 TABLET | Refills: 11 | Status: SHIPPED | OUTPATIENT
Start: 2021-01-21 | End: 2021-02-22

## 2021-01-21 RX ORDER — CITALOPRAM 10 MG/1
10 TABLET ORAL DAILY
Qty: 30 TABLET | Refills: 2 | Status: SHIPPED | OUTPATIENT
Start: 2021-01-21 | End: 2021-04-19

## 2021-01-27 ENCOUNTER — PATIENT MESSAGE (OUTPATIENT)
Dept: ADMINISTRATIVE | Facility: OTHER | Age: 36
End: 2021-01-27

## 2021-02-09 DIAGNOSIS — O09.92 HIGH-RISK PREGNANCY IN SECOND TRIMESTER: Primary | ICD-10-CM

## 2021-02-19 ENCOUNTER — TELEPHONE (OUTPATIENT)
Dept: OBSTETRICS AND GYNECOLOGY | Facility: CLINIC | Age: 36
End: 2021-02-19

## 2021-02-22 ENCOUNTER — PATIENT MESSAGE (OUTPATIENT)
Dept: OBSTETRICS AND GYNECOLOGY | Facility: CLINIC | Age: 36
End: 2021-02-22

## 2021-02-22 ENCOUNTER — OFFICE VISIT (OUTPATIENT)
Dept: MATERNAL FETAL MEDICINE | Facility: CLINIC | Age: 36
End: 2021-02-22
Payer: MEDICAID

## 2021-02-22 ENCOUNTER — ROUTINE PRENATAL (OUTPATIENT)
Dept: OBSTETRICS AND GYNECOLOGY | Facility: CLINIC | Age: 36
End: 2021-02-22
Payer: MEDICAID

## 2021-02-22 VITALS
SYSTOLIC BLOOD PRESSURE: 132 MMHG | HEART RATE: 111 BPM | RESPIRATION RATE: 20 BRPM | DIASTOLIC BLOOD PRESSURE: 80 MMHG | DIASTOLIC BLOOD PRESSURE: 80 MMHG | OXYGEN SATURATION: 98 % | SYSTOLIC BLOOD PRESSURE: 132 MMHG | HEART RATE: 111 BPM | WEIGHT: 154 LBS | BODY MASS INDEX: 26.43 KG/M2 | BODY MASS INDEX: 26.43 KG/M2 | WEIGHT: 154 LBS

## 2021-02-22 DIAGNOSIS — Z34.82 ENCOUNTER FOR SUPERVISION OF NORMAL PREGNANCY IN MULTIGRAVIDA IN SECOND TRIMESTER: Primary | ICD-10-CM

## 2021-02-22 DIAGNOSIS — O09.92 HIGH-RISK PREGNANCY IN SECOND TRIMESTER: ICD-10-CM

## 2021-02-22 DIAGNOSIS — L73.9 FOLLICULITIS: ICD-10-CM

## 2021-02-22 DIAGNOSIS — Z98.891 HISTORY OF CESAREAN DELIVERY: ICD-10-CM

## 2021-02-22 DIAGNOSIS — F41.9 ANXIETY: ICD-10-CM

## 2021-02-22 DIAGNOSIS — O36.0910 ANTI-E ISOIMMUNIZATION AFFECTING PREGNANCY IN FIRST TRIMESTER, SINGLE OR UNSPECIFIED FETUS: ICD-10-CM

## 2021-02-22 PROCEDURE — 99213 PR OFFICE/OUTPT VISIT, EST, LEVL III, 20-29 MIN: ICD-10-PCS | Mod: TH,S$GLB,, | Performed by: OBSTETRICS & GYNECOLOGY

## 2021-02-22 PROCEDURE — 99203 PR OFFICE/OUTPT VISIT, NEW, LEVL III, 30-44 MIN: ICD-10-PCS | Mod: TH,25,S$GLB, | Performed by: OBSTETRICS & GYNECOLOGY

## 2021-02-22 PROCEDURE — 99213 OFFICE O/P EST LOW 20 MIN: CPT | Mod: TH,S$GLB,, | Performed by: OBSTETRICS & GYNECOLOGY

## 2021-02-22 PROCEDURE — 76811 OB US DETAILED SNGL FETUS: CPT | Mod: S$GLB,,, | Performed by: OBSTETRICS & GYNECOLOGY

## 2021-02-22 PROCEDURE — 99203 OFFICE O/P NEW LOW 30 MIN: CPT | Mod: TH,25,S$GLB, | Performed by: OBSTETRICS & GYNECOLOGY

## 2021-02-22 PROCEDURE — 76811 PR US, OB FETAL EVAL & EXAM, TRANSABDOM,FIRST GESTATION: ICD-10-PCS | Mod: S$GLB,,, | Performed by: OBSTETRICS & GYNECOLOGY

## 2021-02-22 RX ORDER — PRENATAL VIT,CAL 76/IRON/FOLIC 29 MG-1 MG
1 TABLET ORAL DAILY
COMMUNITY
Start: 2021-01-31 | End: 2022-05-10

## 2021-02-22 RX ORDER — HYDROXYZINE PAMOATE 25 MG/1
CAPSULE ORAL
COMMUNITY
Start: 2021-02-21 | End: 2021-03-18

## 2021-02-22 RX ORDER — CEPHALEXIN 250 MG/1
250 CAPSULE ORAL 4 TIMES DAILY
Qty: 40 CAPSULE | Refills: 0 | Status: SHIPPED | OUTPATIENT
Start: 2021-02-22 | End: 2021-03-04

## 2021-02-25 ENCOUNTER — PATIENT MESSAGE (OUTPATIENT)
Dept: OBSTETRICS AND GYNECOLOGY | Facility: CLINIC | Age: 36
End: 2021-02-25

## 2021-02-25 LAB
AFP MOM: 0.58
AFP MOM: 0.58
AFP, SERUM: 20.7 NG/ML
AFP, SERUM: 20.7 NG/ML
AGE RISK DOWN SYNDROME: NORMAL
AGE RISK DOWN SYNDROME: NORMAL
CALC'D GESTATIONAL AGE: 16.7 WEEKS
CALC'D GESTATIONAL AGE: 16.7 WEEKS
CIGARETTE SMOKER: NO
CIGARETTE SMOKER: NO
COMMENT: NORMAL
COMMENT: NORMAL
CRITICAL ASSAY: NORMAL
DATE/TIME: NORMAL
DONOR AGE: EGG RETRIEVAL: NORMAL
DONOR AGE: EGG RETRIEVAL: NORMAL
DONOR EGG: NO
DONOR EGG: NO
EDD DETERMINED BY: NORMAL
EDD DETERMINED BY: NORMAL
EST'D DATE OF DELIVERY: NORMAL
EST'D DATE OF DELIVERY: NORMAL
ESTRIOL MOM: 0.83
ESTRIOL MOM: 0.83
ESTRIOL, FREE: 0.88 NG/ML
ESTRIOL, FREE: 0.88 NG/ML
HCG MOM: 2.03
HCG MOM: 2.03
HCG, SERUM: 59.69 IU/ML
HCG, SERUM: 59.69 IU/ML
HX OF NEURAL TUBE DEFECTS: NO
HX OF NEURAL TUBE DEFECTS: NO
INHIBIN A MOM: 1.16
INHIBIN A MOM: 1.16
INHIBIN A, DIMERIC: 172 PG/ML
INHIBIN A, DIMERIC: 172 PG/ML
INSULIN DEPEND DIABETIC: NO
INSULIN DEPEND DIABETIC: NO
INTERPRETATION: NORMAL
INTERPRETATION: NORMAL
LAB PERSONNEL: NORMAL
Lab: NORMAL
Lab: NORMAL
MATERNAL WEIGHT: 154 LBS
MATERNAL WEIGHT: 154 LBS
MOTHER'S ETHNIC ORIGIN: NORMAL
MOTHER'S ETHNIC ORIGIN: NORMAL
MSS DOWN SYNDROME RISK: NORMAL
MSS DOWN SYNDROME RISK: NORMAL
MSS TRISOMY 18 RISK: NORMAL
MSS TRISOMY 18 RISK: NORMAL
NUMBER OF FETUSES: 1
NUMBER OF FETUSES: 1
OTHER ETHNIC INFORMATION: NO
OTHER ETHNIC INFORMATION: NO
PERSON NOTIFIED/TITLE: NORMAL
PREV PREGNANCY DOWN SYND: NO
PREV PREGNANCY DOWN SYND: NO
REPEAT SPECIMEN: NO
REPEAT SPECIMEN: NO
RISK FOR ONTD: NORMAL
RISK FOR ONTD: NORMAL

## 2021-03-22 ENCOUNTER — ROUTINE PRENATAL (OUTPATIENT)
Dept: OBSTETRICS AND GYNECOLOGY | Facility: CLINIC | Age: 36
End: 2021-03-22
Payer: MEDICAID

## 2021-03-22 VITALS
BODY MASS INDEX: 27.29 KG/M2 | DIASTOLIC BLOOD PRESSURE: 66 MMHG | WEIGHT: 159 LBS | SYSTOLIC BLOOD PRESSURE: 109 MMHG | HEART RATE: 87 BPM

## 2021-03-22 DIAGNOSIS — F41.9 ANXIETY: ICD-10-CM

## 2021-03-22 DIAGNOSIS — Z98.891 HISTORY OF CESAREAN DELIVERY: ICD-10-CM

## 2021-03-22 DIAGNOSIS — O36.0910 ANTI-E ISOIMMUNIZATION AFFECTING PREGNANCY IN FIRST TRIMESTER, SINGLE OR UNSPECIFIED FETUS: ICD-10-CM

## 2021-03-22 DIAGNOSIS — Z34.82 ENCOUNTER FOR SUPERVISION OF NORMAL PREGNANCY IN MULTIGRAVIDA IN SECOND TRIMESTER: Primary | ICD-10-CM

## 2021-03-22 PROCEDURE — 99213 PR OFFICE/OUTPT VISIT, EST, LEVL III, 20-29 MIN: ICD-10-PCS | Mod: TH,S$GLB,, | Performed by: OBSTETRICS & GYNECOLOGY

## 2021-03-22 PROCEDURE — 99213 OFFICE O/P EST LOW 20 MIN: CPT | Mod: TH,S$GLB,, | Performed by: OBSTETRICS & GYNECOLOGY

## 2021-03-29 DIAGNOSIS — O09.92 HIGH-RISK PREGNANCY IN SECOND TRIMESTER: Primary | ICD-10-CM

## 2021-04-01 ENCOUNTER — PROCEDURE VISIT (OUTPATIENT)
Dept: MATERNAL FETAL MEDICINE | Facility: CLINIC | Age: 36
End: 2021-04-01
Payer: MEDICAID

## 2021-04-01 VITALS
OXYGEN SATURATION: 98 % | HEART RATE: 92 BPM | SYSTOLIC BLOOD PRESSURE: 122 MMHG | WEIGHT: 158 LBS | DIASTOLIC BLOOD PRESSURE: 72 MMHG | RESPIRATION RATE: 20 BRPM | BODY MASS INDEX: 27.12 KG/M2

## 2021-04-01 DIAGNOSIS — O09.92 HIGH-RISK PREGNANCY IN SECOND TRIMESTER: ICD-10-CM

## 2021-04-01 PROCEDURE — 76816 OB US FOLLOW-UP PER FETUS: CPT | Mod: S$GLB,,, | Performed by: OBSTETRICS & GYNECOLOGY

## 2021-04-01 PROCEDURE — 76816 PR  US,PREGNANT UTERUS,F/U,TRANSABD APP: ICD-10-PCS | Mod: S$GLB,,, | Performed by: OBSTETRICS & GYNECOLOGY

## 2021-04-01 PROCEDURE — 99213 OFFICE O/P EST LOW 20 MIN: CPT | Mod: TH,25,S$GLB, | Performed by: OBSTETRICS & GYNECOLOGY

## 2021-04-01 PROCEDURE — 99213 PR OFFICE/OUTPT VISIT, EST, LEVL III, 20-29 MIN: ICD-10-PCS | Mod: TH,25,S$GLB, | Performed by: OBSTETRICS & GYNECOLOGY

## 2021-04-08 ENCOUNTER — PATIENT MESSAGE (OUTPATIENT)
Dept: OBSTETRICS AND GYNECOLOGY | Facility: CLINIC | Age: 36
End: 2021-04-08

## 2021-04-19 ENCOUNTER — PATIENT MESSAGE (OUTPATIENT)
Dept: OBSTETRICS AND GYNECOLOGY | Facility: CLINIC | Age: 36
End: 2021-04-19

## 2021-04-19 ENCOUNTER — ROUTINE PRENATAL (OUTPATIENT)
Dept: OBSTETRICS AND GYNECOLOGY | Facility: CLINIC | Age: 36
End: 2021-04-19
Payer: MEDICAID

## 2021-04-19 VITALS
SYSTOLIC BLOOD PRESSURE: 104 MMHG | BODY MASS INDEX: 28.05 KG/M2 | HEART RATE: 93 BPM | WEIGHT: 163.38 LBS | DIASTOLIC BLOOD PRESSURE: 69 MMHG

## 2021-04-19 DIAGNOSIS — F41.9 ANXIETY: ICD-10-CM

## 2021-04-19 DIAGNOSIS — Z98.891 HISTORY OF CESAREAN DELIVERY: ICD-10-CM

## 2021-04-19 DIAGNOSIS — O36.0910 ANTI-E ISOIMMUNIZATION AFFECTING PREGNANCY IN FIRST TRIMESTER, SINGLE OR UNSPECIFIED FETUS: ICD-10-CM

## 2021-04-19 DIAGNOSIS — Z34.82 ENCOUNTER FOR SUPERVISION OF NORMAL PREGNANCY IN MULTIGRAVIDA IN SECOND TRIMESTER: Primary | ICD-10-CM

## 2021-04-19 PROCEDURE — 99213 OFFICE O/P EST LOW 20 MIN: CPT | Mod: TH,S$GLB,, | Performed by: OBSTETRICS & GYNECOLOGY

## 2021-04-19 PROCEDURE — 99213 PR OFFICE/OUTPT VISIT, EST, LEVL III, 20-29 MIN: ICD-10-PCS | Mod: TH,S$GLB,, | Performed by: OBSTETRICS & GYNECOLOGY

## 2021-04-21 ENCOUNTER — PATIENT MESSAGE (OUTPATIENT)
Dept: ADMINISTRATIVE | Facility: OTHER | Age: 36
End: 2021-04-21

## 2021-05-05 LAB — GLUCOSE 1 HR POST 50 GM: 118 MG/DL

## 2021-05-10 ENCOUNTER — ROUTINE PRENATAL (OUTPATIENT)
Dept: OBSTETRICS AND GYNECOLOGY | Facility: CLINIC | Age: 36
End: 2021-05-10
Payer: MEDICAID

## 2021-05-10 VITALS
DIASTOLIC BLOOD PRESSURE: 81 MMHG | HEART RATE: 105 BPM | WEIGHT: 167 LBS | SYSTOLIC BLOOD PRESSURE: 127 MMHG | BODY MASS INDEX: 28.67 KG/M2

## 2021-05-10 DIAGNOSIS — F41.9 ANXIETY: ICD-10-CM

## 2021-05-10 DIAGNOSIS — O36.0910 ANTI-E ISOIMMUNIZATION AFFECTING PREGNANCY IN FIRST TRIMESTER, SINGLE OR UNSPECIFIED FETUS: ICD-10-CM

## 2021-05-10 DIAGNOSIS — Z34.83 ENCOUNTER FOR SUPERVISION OF NORMAL PREGNANCY IN MULTIGRAVIDA IN THIRD TRIMESTER: Primary | ICD-10-CM

## 2021-05-10 DIAGNOSIS — Z98.891 HISTORY OF CESAREAN DELIVERY: ICD-10-CM

## 2021-05-10 PROCEDURE — 99213 OFFICE O/P EST LOW 20 MIN: CPT | Mod: TH,S$GLB,, | Performed by: OBSTETRICS & GYNECOLOGY

## 2021-05-10 PROCEDURE — 99213 PR OFFICE/OUTPT VISIT, EST, LEVL III, 20-29 MIN: ICD-10-PCS | Mod: TH,S$GLB,, | Performed by: OBSTETRICS & GYNECOLOGY

## 2021-05-28 ENCOUNTER — TELEPHONE (OUTPATIENT)
Dept: OBSTETRICS AND GYNECOLOGY | Facility: CLINIC | Age: 36
End: 2021-05-28

## 2021-05-31 ENCOUNTER — PATIENT MESSAGE (OUTPATIENT)
Dept: OBSTETRICS AND GYNECOLOGY | Facility: CLINIC | Age: 36
End: 2021-05-31

## 2021-05-31 ENCOUNTER — ROUTINE PRENATAL (OUTPATIENT)
Dept: OBSTETRICS AND GYNECOLOGY | Facility: CLINIC | Age: 36
End: 2021-05-31
Payer: MEDICAID

## 2021-05-31 VITALS
BODY MASS INDEX: 29.52 KG/M2 | DIASTOLIC BLOOD PRESSURE: 81 MMHG | SYSTOLIC BLOOD PRESSURE: 131 MMHG | HEART RATE: 111 BPM | WEIGHT: 172 LBS

## 2021-05-31 DIAGNOSIS — Z98.891 HISTORY OF CESAREAN DELIVERY: ICD-10-CM

## 2021-05-31 DIAGNOSIS — O36.0910 ANTI-E ISOIMMUNIZATION AFFECTING PREGNANCY IN FIRST TRIMESTER, SINGLE OR UNSPECIFIED FETUS: ICD-10-CM

## 2021-05-31 DIAGNOSIS — R82.90 ABNORMAL FINDING IN URINE: ICD-10-CM

## 2021-05-31 DIAGNOSIS — Z34.83 ENCOUNTER FOR SUPERVISION OF NORMAL PREGNANCY IN MULTIGRAVIDA IN THIRD TRIMESTER: Primary | ICD-10-CM

## 2021-05-31 DIAGNOSIS — F41.9 ANXIETY: ICD-10-CM

## 2021-05-31 PROCEDURE — 90471 IMMUNIZATION ADMIN: CPT | Mod: S$GLB,,, | Performed by: OBSTETRICS & GYNECOLOGY

## 2021-05-31 PROCEDURE — 99213 OFFICE O/P EST LOW 20 MIN: CPT | Mod: 25,TH,S$GLB, | Performed by: OBSTETRICS & GYNECOLOGY

## 2021-05-31 PROCEDURE — 99213 PR OFFICE/OUTPT VISIT, EST, LEVL III, 20-29 MIN: ICD-10-PCS | Mod: 25,TH,S$GLB, | Performed by: OBSTETRICS & GYNECOLOGY

## 2021-05-31 PROCEDURE — 90471 TDAP VACCINE GREATER THAN OR EQUAL TO 7YO IM: ICD-10-PCS | Mod: S$GLB,,, | Performed by: OBSTETRICS & GYNECOLOGY

## 2021-05-31 PROCEDURE — 90715 TDAP VACCINE 7 YRS/> IM: CPT | Mod: S$GLB,,, | Performed by: OBSTETRICS & GYNECOLOGY

## 2021-05-31 PROCEDURE — 90715 TDAP VACCINE GREATER THAN OR EQUAL TO 7YO IM: ICD-10-PCS | Mod: S$GLB,,, | Performed by: OBSTETRICS & GYNECOLOGY

## 2021-05-31 RX ORDER — NITROFURANTOIN 25; 75 MG/1; MG/1
100 CAPSULE ORAL 2 TIMES DAILY
Qty: 14 CAPSULE | Refills: 0 | Status: SHIPPED | OUTPATIENT
Start: 2021-05-31 | End: 2021-06-07

## 2021-06-01 ENCOUNTER — PATIENT MESSAGE (OUTPATIENT)
Dept: OBSTETRICS AND GYNECOLOGY | Facility: CLINIC | Age: 36
End: 2021-06-01

## 2021-06-02 ENCOUNTER — PATIENT MESSAGE (OUTPATIENT)
Dept: OBSTETRICS AND GYNECOLOGY | Facility: CLINIC | Age: 36
End: 2021-06-02

## 2021-06-07 LAB
ABS NRBC COUNT: 0 X 10 3/UL (ref 0–0.01)
ABSOLUTE BASOPHIL: 0.02 X 10 3/UL (ref 0–0.22)
ABSOLUTE EOSINOPHIL: 0.26 X 10 3/UL (ref 0.04–0.54)
ABSOLUTE IMMATURE GRAN: 0.11 X 10 3/UL (ref 0–0.04)
ABSOLUTE LYMPHOCYTE: 1.15 X 10 3/UL (ref 0.86–4.75)
ABSOLUTE MONOCYTE: 0.65 X 10 3/UL (ref 0.22–1.08)
BASOPHILS NFR BLD: 0.2 % (ref 0.2–1.2)
EOSINOPHIL NFR BLD: 2.4 % (ref 0.7–7)
HCT VFR BLD AUTO: 32.9 % (ref 37–47)
HGB BLD-MCNC: 10.7 G/DL (ref 12–16)
IMMATURE GRANULOCYTES: 1 % (ref 0–0.5)
LYMPHOCYTES NFR BLD: 10.7 % (ref 19.3–53.1)
MCH RBC QN AUTO: 28.5 PG (ref 27–32)
MCHC RBC AUTO-ENTMCNC: 32.5 G/DL (ref 32–36)
MCV RBC AUTO: 87.5 FL (ref 82–100)
MONOCYTES NFR BLD: 6.1 % (ref 4.7–12.5)
NEUTROPHILS # BLD AUTO: 8.54 X 10 3/UL (ref 2.15–7.56)
NEUTROPHILS NFR BLD: 79.6 %
NUCLEATED RED BLOOD CELLS: 0 /100 WBC (ref 0–0.2)
PLATELET # BLD AUTO: 279 X 10 3/UL (ref 135–400)
RBC # BLD AUTO: 3.76 X 10 6/UL (ref 4.2–5.4)
RDW-SD: 42 FL (ref 37–54)
WBC # BLD: 10.73 X 10 3/UL (ref 4.3–10.8)

## 2021-06-08 ENCOUNTER — TELEPHONE (OUTPATIENT)
Dept: OBSTETRICS AND GYNECOLOGY | Facility: CLINIC | Age: 36
End: 2021-06-08

## 2021-06-11 ENCOUNTER — ROUTINE PRENATAL (OUTPATIENT)
Dept: OBSTETRICS AND GYNECOLOGY | Facility: CLINIC | Age: 36
End: 2021-06-11
Payer: MEDICAID

## 2021-06-11 VITALS
WEIGHT: 174 LBS | BODY MASS INDEX: 29.87 KG/M2 | HEART RATE: 88 BPM | DIASTOLIC BLOOD PRESSURE: 80 MMHG | SYSTOLIC BLOOD PRESSURE: 128 MMHG

## 2021-06-11 DIAGNOSIS — Z34.83 ENCOUNTER FOR SUPERVISION OF NORMAL PREGNANCY IN MULTIGRAVIDA IN THIRD TRIMESTER: Primary | ICD-10-CM

## 2021-06-11 DIAGNOSIS — Z98.891 HISTORY OF CESAREAN DELIVERY: ICD-10-CM

## 2021-06-11 DIAGNOSIS — F41.9 ANXIETY: ICD-10-CM

## 2021-06-11 DIAGNOSIS — O36.0910 ANTI-E ISOIMMUNIZATION AFFECTING PREGNANCY IN FIRST TRIMESTER, SINGLE OR UNSPECIFIED FETUS: ICD-10-CM

## 2021-06-11 PROCEDURE — 99213 OFFICE O/P EST LOW 20 MIN: CPT | Mod: TH,S$GLB,, | Performed by: OBSTETRICS & GYNECOLOGY

## 2021-06-11 PROCEDURE — 99213 PR OFFICE/OUTPT VISIT, EST, LEVL III, 20-29 MIN: ICD-10-PCS | Mod: TH,S$GLB,, | Performed by: OBSTETRICS & GYNECOLOGY

## 2021-06-25 ENCOUNTER — ROUTINE PRENATAL (OUTPATIENT)
Dept: OBSTETRICS AND GYNECOLOGY | Facility: CLINIC | Age: 36
End: 2021-06-25
Payer: MEDICAID

## 2021-06-25 VITALS
DIASTOLIC BLOOD PRESSURE: 80 MMHG | HEART RATE: 97 BPM | WEIGHT: 176 LBS | BODY MASS INDEX: 30.21 KG/M2 | SYSTOLIC BLOOD PRESSURE: 129 MMHG

## 2021-06-25 DIAGNOSIS — O36.0910 ANTI-E ISOIMMUNIZATION AFFECTING PREGNANCY IN FIRST TRIMESTER, SINGLE OR UNSPECIFIED FETUS: ICD-10-CM

## 2021-06-25 DIAGNOSIS — Z34.83 ENCOUNTER FOR SUPERVISION OF NORMAL PREGNANCY IN MULTIGRAVIDA IN THIRD TRIMESTER: Primary | ICD-10-CM

## 2021-06-25 DIAGNOSIS — Z98.891 HISTORY OF CESAREAN DELIVERY: ICD-10-CM

## 2021-06-25 DIAGNOSIS — F41.9 ANXIETY: ICD-10-CM

## 2021-06-25 PROCEDURE — 99213 PR OFFICE/OUTPT VISIT, EST, LEVL III, 20-29 MIN: ICD-10-PCS | Mod: TH,S$GLB,, | Performed by: OBSTETRICS & GYNECOLOGY

## 2021-06-25 PROCEDURE — 99213 OFFICE O/P EST LOW 20 MIN: CPT | Mod: TH,S$GLB,, | Performed by: OBSTETRICS & GYNECOLOGY

## 2021-07-12 ENCOUNTER — ROUTINE PRENATAL (OUTPATIENT)
Dept: OBSTETRICS AND GYNECOLOGY | Facility: CLINIC | Age: 36
End: 2021-07-12
Payer: MEDICAID

## 2021-07-12 VITALS
WEIGHT: 180 LBS | HEART RATE: 105 BPM | SYSTOLIC BLOOD PRESSURE: 147 MMHG | BODY MASS INDEX: 30.9 KG/M2 | DIASTOLIC BLOOD PRESSURE: 85 MMHG

## 2021-07-12 DIAGNOSIS — O16.3 ELEVATED BLOOD PRESSURE AFFECTING PREGNANCY IN THIRD TRIMESTER, ANTEPARTUM: ICD-10-CM

## 2021-07-12 DIAGNOSIS — Z34.83 ENCOUNTER FOR SUPERVISION OF NORMAL PREGNANCY IN MULTIGRAVIDA IN THIRD TRIMESTER: Primary | ICD-10-CM

## 2021-07-12 DIAGNOSIS — O36.0910 ANTI-E ISOIMMUNIZATION AFFECTING PREGNANCY IN FIRST TRIMESTER, SINGLE OR UNSPECIFIED FETUS: ICD-10-CM

## 2021-07-12 DIAGNOSIS — Z98.891 HISTORY OF CESAREAN DELIVERY: ICD-10-CM

## 2021-07-12 DIAGNOSIS — F41.9 ANXIETY: ICD-10-CM

## 2021-07-12 PROCEDURE — 76819 PR US, OB, FETAL BIOPHYSICAL, W/O NST: ICD-10-PCS | Mod: S$GLB,,, | Performed by: OBSTETRICS & GYNECOLOGY

## 2021-07-12 PROCEDURE — 99214 PR OFFICE/OUTPT VISIT, EST, LEVL IV, 30-39 MIN: ICD-10-PCS | Mod: TH,25,S$GLB, | Performed by: OBSTETRICS & GYNECOLOGY

## 2021-07-12 PROCEDURE — 99214 OFFICE O/P EST MOD 30 MIN: CPT | Mod: TH,25,S$GLB, | Performed by: OBSTETRICS & GYNECOLOGY

## 2021-07-12 PROCEDURE — 76819 FETAL BIOPHYS PROFIL W/O NST: CPT | Mod: S$GLB,,, | Performed by: OBSTETRICS & GYNECOLOGY

## 2021-07-13 ENCOUNTER — ROUTINE PRENATAL (OUTPATIENT)
Dept: OBSTETRICS AND GYNECOLOGY | Facility: CLINIC | Age: 36
End: 2021-07-13
Payer: MEDICAID

## 2021-07-13 VITALS
BODY MASS INDEX: 30.66 KG/M2 | HEART RATE: 110 BPM | WEIGHT: 178.63 LBS | SYSTOLIC BLOOD PRESSURE: 148 MMHG | DIASTOLIC BLOOD PRESSURE: 85 MMHG

## 2021-07-13 DIAGNOSIS — O16.3 ELEVATED BLOOD PRESSURE AFFECTING PREGNANCY IN THIRD TRIMESTER, ANTEPARTUM: ICD-10-CM

## 2021-07-13 DIAGNOSIS — Z34.83 ENCOUNTER FOR SUPERVISION OF NORMAL PREGNANCY IN MULTIGRAVIDA IN THIRD TRIMESTER: Primary | ICD-10-CM

## 2021-07-13 DIAGNOSIS — O36.0910 ANTI-E ISOIMMUNIZATION AFFECTING PREGNANCY IN FIRST TRIMESTER, SINGLE OR UNSPECIFIED FETUS: ICD-10-CM

## 2021-07-13 DIAGNOSIS — Z98.891 HISTORY OF CESAREAN DELIVERY: ICD-10-CM

## 2021-07-13 DIAGNOSIS — F41.9 ANXIETY: ICD-10-CM

## 2021-07-13 PROCEDURE — 99213 PR OFFICE/OUTPT VISIT, EST, LEVL III, 20-29 MIN: ICD-10-PCS | Mod: TH,S$GLB,, | Performed by: OBSTETRICS & GYNECOLOGY

## 2021-07-13 PROCEDURE — 99213 OFFICE O/P EST LOW 20 MIN: CPT | Mod: TH,S$GLB,, | Performed by: OBSTETRICS & GYNECOLOGY

## 2021-07-14 ENCOUNTER — OUTSIDE PLACE OF SERVICE (OUTPATIENT)
Dept: OBSTETRICS AND GYNECOLOGY | Facility: CLINIC | Age: 36
End: 2021-07-14
Payer: MEDICAID

## 2021-07-14 LAB
APPEARANCE, UA: CLEAR
APTT PPP: 29.5 SEC (ref 23.6–36.4)
BACTERIA SPEC CULT: ABNORMAL /HPF
BILIRUB UR QL STRIP: NEGATIVE MG/DL
CALCIUM BLD-MCNC: POSITIVE MG/DL
CELLS COUNTED: 100
COLOR UR: ABNORMAL
COVID-19 AB, IGM: NEGATIVE
EOSINOPHIL NFR BLD: 2 % (ref 1–3)
ERYTHROCYTE [DISTWIDTH] IN BLOOD BY AUTOMATED COUNT: 13.2 % (ref 12.5–18)
FIBRINOGEN ANTIGEN: 547 MG/DL (ref 259–489)
GLUCOSE (UA): NORMAL MG/DL
HCT VFR BLD AUTO: 33.4 % (ref 37–47)
HGB BLD-MCNC: 11.1 G/DL (ref 12–16)
HGB UR QL STRIP: 50 /UL
INR PPP: 1 INR (ref 0.9–1.1)
KETONES UR QL STRIP: NEGATIVE MG/DL
LEUKOCYTE ESTERASE UR QL STRIP: 25 /UL
LYMPHOCYTES NFR BLD: 7 % (ref 25–40)
MCH RBC QN AUTO: 27.8 PG (ref 27–31.2)
MCHC RBC AUTO-ENTMCNC: 33.2 G/DL (ref 31.8–35.4)
MCV RBC AUTO: 83.7 FL (ref 80–97)
MONOCYTES NFR BLD: 6 % (ref 1–15)
NEUTROPHILS # BLD AUTO: 9.3 10*3/UL (ref 1.8–7.7)
NEUTROPHILS NFR BLD: 85 % (ref 37–80)
NITRITE UR QL STRIP: NEGATIVE
NUCLEATED RED BLOOD CELLS: 0 %
PH UR STRIP: 7 PH (ref 5–9)
PLATELETS: 233 10*3/UL (ref 142–424)
PROT UR QL STRIP: ABNORMAL MG/DL
PROTHROMBIN TIME: 11.7 SEC (ref 10.4–12.8)
RBC # BLD AUTO: 3.99 10*6/UL (ref 4.2–5.4)
RBC #/AREA URNS HPF: ABNORMAL /HPF (ref 0–2)
RBC MORPH BLD: ABNORMAL
RPR: NON REACTIVE
SERVICE COMMENT 03: ABNORMAL
SMALL PLATELETS BLD QL SMEAR: NORMAL
SP GR UR STRIP: 1.01 (ref 1–1.03)
SPECIMEN COLLECTION METHOD, URINE: ABNORMAL
SQUAMOUS EPITHELIAL, UA: ABNORMAL /LPF
UROBILINOGEN UR STRIP-ACNC: NORMAL MG/DL
WBC # BLD: 10.9 10*3/UL (ref 4.6–10.2)
WBC #/AREA URNS HPF: ABNORMAL /HPF (ref 0–5)

## 2021-07-14 PROCEDURE — 59514 PR CESAREAN DELIVERY ONLY: ICD-10-PCS | Mod: AT,,, | Performed by: OBSTETRICS & GYNECOLOGY

## 2021-07-14 PROCEDURE — 59514 CESAREAN DELIVERY ONLY: CPT | Mod: AT,,, | Performed by: OBSTETRICS & GYNECOLOGY

## 2021-07-15 LAB
ERYTHROCYTE [DISTWIDTH] IN BLOOD BY AUTOMATED COUNT: 13.3 % (ref 12.5–18)
HCT VFR BLD AUTO: 26.6 % (ref 37–47)
HGB BLD-MCNC: 8.9 G/DL (ref 12–16)
MCH RBC QN AUTO: 28.3 PG (ref 27–31.2)
MCHC RBC AUTO-ENTMCNC: 33.5 G/DL (ref 31.8–35.4)
MCV RBC AUTO: 84.4 FL (ref 80–97)
NUCLEATED RED BLOOD CELLS: 0 %
PLATELETS: 218 10*3/UL (ref 142–424)
RBC # BLD AUTO: 3.15 10*6/UL (ref 4.2–5.4)
WBC # BLD: 8.6 10*3/UL (ref 4.6–10.2)

## 2021-07-15 PROCEDURE — 99231 PR SUBSEQUENT HOSPITAL CARE,LEVL I: ICD-10-PCS | Mod: ,,, | Performed by: OBSTETRICS & GYNECOLOGY

## 2021-07-15 PROCEDURE — 99231 SBSQ HOSP IP/OBS SF/LOW 25: CPT | Mod: ,,, | Performed by: OBSTETRICS & GYNECOLOGY

## 2021-07-16 PROCEDURE — 99231 PR SUBSEQUENT HOSPITAL CARE,LEVL I: ICD-10-PCS | Mod: ,,, | Performed by: OBSTETRICS & GYNECOLOGY

## 2021-07-16 PROCEDURE — 99231 SBSQ HOSP IP/OBS SF/LOW 25: CPT | Mod: ,,, | Performed by: OBSTETRICS & GYNECOLOGY

## 2021-07-17 ENCOUNTER — PATIENT MESSAGE (OUTPATIENT)
Dept: OBSTETRICS AND GYNECOLOGY | Facility: CLINIC | Age: 36
End: 2021-07-17

## 2021-07-17 PROCEDURE — 99238 HOSP IP/OBS DSCHRG MGMT 30/<: CPT | Mod: ,,, | Performed by: OBSTETRICS & GYNECOLOGY

## 2021-07-17 PROCEDURE — 99238 PR HOSPITAL DISCHARGE DAY,<30 MIN: ICD-10-PCS | Mod: ,,, | Performed by: OBSTETRICS & GYNECOLOGY

## 2021-07-22 ENCOUNTER — POSTPARTUM VISIT (OUTPATIENT)
Dept: OBSTETRICS AND GYNECOLOGY | Facility: CLINIC | Age: 36
End: 2021-07-22
Payer: MEDICAID

## 2021-07-22 VITALS
BODY MASS INDEX: 28.15 KG/M2 | HEART RATE: 89 BPM | DIASTOLIC BLOOD PRESSURE: 91 MMHG | SYSTOLIC BLOOD PRESSURE: 149 MMHG | WEIGHT: 164 LBS

## 2021-07-22 DIAGNOSIS — F41.9 ANXIETY: ICD-10-CM

## 2021-07-22 PROCEDURE — 99499 UNLISTED E&M SERVICE: CPT | Mod: S$GLB,,, | Performed by: OBSTETRICS & GYNECOLOGY

## 2021-07-22 PROCEDURE — 99499 NO LOS: ICD-10-PCS | Mod: S$GLB,,, | Performed by: OBSTETRICS & GYNECOLOGY

## 2021-07-22 RX ORDER — CITALOPRAM 10 MG/1
10 TABLET ORAL DAILY
Qty: 90 TABLET | Refills: 1 | Status: SHIPPED | OUTPATIENT
Start: 2021-07-22 | End: 2021-09-30

## 2021-07-30 ENCOUNTER — OFFICE VISIT (OUTPATIENT)
Dept: UROLOGY | Facility: CLINIC | Age: 36
End: 2021-07-30
Payer: MEDICAID

## 2021-07-30 DIAGNOSIS — N39.0 FREQUENT UTI: Primary | ICD-10-CM

## 2021-07-30 PROCEDURE — 99213 OFFICE O/P EST LOW 20 MIN: CPT | Mod: S$GLB,,, | Performed by: NURSE PRACTITIONER

## 2021-07-30 PROCEDURE — 99213 PR OFFICE/OUTPT VISIT, EST, LEVL III, 20-29 MIN: ICD-10-PCS | Mod: S$GLB,,, | Performed by: NURSE PRACTITIONER

## 2021-08-04 ENCOUNTER — PATIENT MESSAGE (OUTPATIENT)
Dept: OBSTETRICS AND GYNECOLOGY | Facility: CLINIC | Age: 36
End: 2021-08-04

## 2021-08-05 ENCOUNTER — POSTPARTUM VISIT (OUTPATIENT)
Dept: OBSTETRICS AND GYNECOLOGY | Facility: CLINIC | Age: 36
End: 2021-08-05
Payer: MEDICAID

## 2021-08-05 VITALS
HEART RATE: 83 BPM | WEIGHT: 159 LBS | BODY MASS INDEX: 27.29 KG/M2 | DIASTOLIC BLOOD PRESSURE: 97 MMHG | SYSTOLIC BLOOD PRESSURE: 152 MMHG

## 2021-08-05 DIAGNOSIS — L73.9 FOLLICULITIS: Primary | ICD-10-CM

## 2021-08-05 PROCEDURE — 99213 OFFICE O/P EST LOW 20 MIN: CPT | Mod: S$GLB,,, | Performed by: OBSTETRICS & GYNECOLOGY

## 2021-08-05 PROCEDURE — 99213 PR OFFICE/OUTPT VISIT, EST, LEVL III, 20-29 MIN: ICD-10-PCS | Mod: S$GLB,,, | Performed by: OBSTETRICS & GYNECOLOGY

## 2021-08-19 ENCOUNTER — PATIENT MESSAGE (OUTPATIENT)
Dept: OBSTETRICS AND GYNECOLOGY | Facility: CLINIC | Age: 36
End: 2021-08-19

## 2021-08-19 ENCOUNTER — OFFICE VISIT (OUTPATIENT)
Dept: OBSTETRICS AND GYNECOLOGY | Facility: CLINIC | Age: 36
End: 2021-08-19
Payer: MEDICAID

## 2021-08-19 VITALS
SYSTOLIC BLOOD PRESSURE: 144 MMHG | HEART RATE: 109 BPM | DIASTOLIC BLOOD PRESSURE: 85 MMHG | WEIGHT: 159 LBS | BODY MASS INDEX: 27.29 KG/M2

## 2021-08-19 DIAGNOSIS — N61.0 MASTITIS WITHOUT ABSCESS: Primary | ICD-10-CM

## 2021-08-19 PROCEDURE — 99213 OFFICE O/P EST LOW 20 MIN: CPT | Mod: S$GLB,,, | Performed by: OBSTETRICS & GYNECOLOGY

## 2021-08-19 PROCEDURE — 99213 PR OFFICE/OUTPT VISIT, EST, LEVL III, 20-29 MIN: ICD-10-PCS | Mod: S$GLB,,, | Performed by: OBSTETRICS & GYNECOLOGY

## 2021-08-20 ENCOUNTER — PATIENT MESSAGE (OUTPATIENT)
Dept: OBSTETRICS AND GYNECOLOGY | Facility: CLINIC | Age: 36
End: 2021-08-20

## 2021-08-26 ENCOUNTER — POSTPARTUM VISIT (OUTPATIENT)
Dept: OBSTETRICS AND GYNECOLOGY | Facility: CLINIC | Age: 36
End: 2021-08-26
Payer: MEDICAID

## 2021-08-26 ENCOUNTER — PATIENT MESSAGE (OUTPATIENT)
Dept: OBSTETRICS AND GYNECOLOGY | Facility: CLINIC | Age: 36
End: 2021-08-26

## 2021-08-26 VITALS
HEART RATE: 68 BPM | SYSTOLIC BLOOD PRESSURE: 116 MMHG | BODY MASS INDEX: 27.12 KG/M2 | DIASTOLIC BLOOD PRESSURE: 74 MMHG | WEIGHT: 158 LBS

## 2021-08-26 PROCEDURE — 99213 PR OFFICE/OUTPT VISIT, EST, LEVL III, 20-29 MIN: ICD-10-PCS | Mod: S$GLB,,, | Performed by: OBSTETRICS & GYNECOLOGY

## 2021-08-26 PROCEDURE — 99213 OFFICE O/P EST LOW 20 MIN: CPT | Mod: S$GLB,,, | Performed by: OBSTETRICS & GYNECOLOGY

## 2021-08-31 ENCOUNTER — PATIENT MESSAGE (OUTPATIENT)
Dept: OBSTETRICS AND GYNECOLOGY | Facility: CLINIC | Age: 36
End: 2021-08-31

## 2021-09-01 ENCOUNTER — PATIENT MESSAGE (OUTPATIENT)
Dept: OBSTETRICS AND GYNECOLOGY | Facility: CLINIC | Age: 36
End: 2021-09-01

## 2021-09-02 ENCOUNTER — OFFICE VISIT (OUTPATIENT)
Dept: UROLOGY | Facility: CLINIC | Age: 36
End: 2021-09-02
Payer: MEDICAID

## 2021-09-02 VITALS — BODY MASS INDEX: 25.61 KG/M2 | HEIGHT: 64 IN | WEIGHT: 150 LBS

## 2021-09-02 DIAGNOSIS — R10.11 RIGHT UPPER QUADRANT ABDOMINAL PAIN: ICD-10-CM

## 2021-09-02 PROCEDURE — 99213 OFFICE O/P EST LOW 20 MIN: CPT | Mod: S$GLB,,, | Performed by: NURSE PRACTITIONER

## 2021-09-02 PROCEDURE — 99213 PR OFFICE/OUTPT VISIT, EST, LEVL III, 20-29 MIN: ICD-10-PCS | Mod: S$GLB,,, | Performed by: NURSE PRACTITIONER

## 2021-09-03 ENCOUNTER — PATIENT MESSAGE (OUTPATIENT)
Dept: UROLOGY | Facility: CLINIC | Age: 36
End: 2021-09-03

## 2021-09-03 ENCOUNTER — TELEPHONE (OUTPATIENT)
Dept: UROLOGY | Facility: CLINIC | Age: 36
End: 2021-09-03

## 2021-09-15 ENCOUNTER — CLINICAL SUPPORT (OUTPATIENT)
Dept: UROLOGY | Facility: CLINIC | Age: 36
End: 2021-09-15
Payer: MEDICAID

## 2021-09-15 ENCOUNTER — TELEPHONE (OUTPATIENT)
Dept: UROLOGY | Facility: CLINIC | Age: 36
End: 2021-09-15

## 2021-09-30 ENCOUNTER — OFFICE VISIT (OUTPATIENT)
Dept: UROLOGY | Facility: CLINIC | Age: 36
End: 2021-09-30
Payer: MEDICAID

## 2021-09-30 VITALS
HEIGHT: 64 IN | HEART RATE: 74 BPM | BODY MASS INDEX: 25.61 KG/M2 | DIASTOLIC BLOOD PRESSURE: 79 MMHG | SYSTOLIC BLOOD PRESSURE: 142 MMHG | WEIGHT: 150 LBS

## 2021-09-30 DIAGNOSIS — R10.9 RIGHT FLANK PAIN: ICD-10-CM

## 2021-09-30 DIAGNOSIS — N20.0 KIDNEY STONE: Primary | ICD-10-CM

## 2021-09-30 DIAGNOSIS — N39.0 FREQUENT UTI: ICD-10-CM

## 2021-09-30 PROBLEM — N17.9 ACUTE RENAL FAILURE: Status: ACTIVE | Noted: 2021-09-30

## 2021-09-30 PROBLEM — Z34.90 PREGNANCY: Status: ACTIVE | Noted: 2021-09-30

## 2021-09-30 PROBLEM — R78.81 BACTEREMIA: Status: ACTIVE | Noted: 2021-09-30

## 2021-09-30 PROBLEM — Z72.0 TOBACCO USER: Status: ACTIVE | Noted: 2021-09-30

## 2021-09-30 PROBLEM — F41.9 ANXIETY DISORDER: Status: ACTIVE | Noted: 2018-01-25

## 2021-09-30 PROBLEM — O23.40 URINARY TRACT INFECTION IN MOTHER DURING PREGNANCY: Status: ACTIVE | Noted: 2021-09-30

## 2021-09-30 PROBLEM — Z12.4 SCREENING FOR MALIGNANT NEOPLASM OF CERVIX: Status: ACTIVE | Noted: 2018-01-25

## 2021-09-30 PROBLEM — Z98.891 STATUS POST CESAREAN DELIVERY: Status: ACTIVE | Noted: 2021-09-30

## 2021-09-30 PROBLEM — F90.9 ADULT ATTENTION DEFICIT HYPERACTIVITY DISORDER: Status: ACTIVE | Noted: 2021-09-30

## 2021-09-30 PROBLEM — D50.9 IRON DEFICIENCY ANEMIA: Status: ACTIVE | Noted: 2021-09-30

## 2021-09-30 PROBLEM — O16.9 HYPERTENSION AFFECTING PREGNANCY: Status: ACTIVE | Noted: 2021-09-30

## 2021-09-30 PROBLEM — F90.0 ATTENTION DEFICIT HYPERACTIVITY DISORDER, PREDOMINANTLY INATTENTIVE TYPE: Status: ACTIVE | Noted: 2018-01-25

## 2021-09-30 PROBLEM — E87.6 HYPOKALEMIA: Status: ACTIVE | Noted: 2021-09-30

## 2021-09-30 PROBLEM — R53.83 FATIGUE: Status: ACTIVE | Noted: 2021-09-30

## 2021-09-30 PROCEDURE — 81001 URINALYSIS AUTO W/SCOPE: CPT | Mod: S$GLB,,, | Performed by: UROLOGY

## 2021-09-30 PROCEDURE — 81001 PR  URINALYSIS, AUTO, W/SCOPE: ICD-10-PCS | Mod: S$GLB,,, | Performed by: UROLOGY

## 2021-09-30 PROCEDURE — 99214 OFFICE O/P EST MOD 30 MIN: CPT | Mod: S$GLB,,, | Performed by: UROLOGY

## 2021-09-30 PROCEDURE — 99214 PR OFFICE/OUTPT VISIT, EST, LEVL IV, 30-39 MIN: ICD-10-PCS | Mod: S$GLB,,, | Performed by: UROLOGY

## 2021-10-04 ENCOUNTER — TELEPHONE (OUTPATIENT)
Dept: UROLOGY | Facility: CLINIC | Age: 36
End: 2021-10-04

## 2021-10-04 ENCOUNTER — PATIENT MESSAGE (OUTPATIENT)
Dept: OBSTETRICS AND GYNECOLOGY | Facility: CLINIC | Age: 36
End: 2021-10-04

## 2021-10-05 LAB
ANION GAP SERPL CALC-SCNC: 9 MMOL/L (ref 3–11)
APPEARANCE, UA: CLEAR
B-HCG UR QL: NEGATIVE
BASOPHILS NFR BLD: 0.4 % (ref 0–3)
BILIRUB UR QL STRIP: NEGATIVE MG/DL
BUN SERPL-MCNC: 14 MG/DL (ref 7–18)
BUN/CREAT SERPL: 17.94 RATIO (ref 7–18)
CALCIUM BLD-MCNC: POSITIVE MG/DL
CALCIUM SERPL-MCNC: 9.2 MG/DL (ref 8.8–10.5)
CHLORIDE SERPL-SCNC: 106 MMOL/L (ref 100–108)
CO2 SERPL-SCNC: 25 MMOL/L (ref 21–32)
COLOR UR: NORMAL
COVID-19 AB, IGM: NEGATIVE
CREAT SERPL-MCNC: 0.78 MG/DL (ref 0.55–1.02)
EOSINOPHIL NFR BLD: 4.6 % (ref 1–3)
ERYTHROCYTE [DISTWIDTH] IN BLOOD BY AUTOMATED COUNT: 15.3 % (ref 12.5–18)
GFR ESTIMATION: > 60
GLUCOSE (UA): NORMAL MG/DL
GLUCOSE SERPL-MCNC: 91 MG/DL (ref 70–110)
HCT VFR BLD AUTO: 40.6 % (ref 37–47)
HGB BLD-MCNC: 13 G/DL (ref 12–16)
HGB UR QL STRIP: NEGATIVE /UL
KETONES UR QL STRIP: NEGATIVE MG/DL
LEUKOCYTE ESTERASE UR QL STRIP: NEGATIVE /UL
LYMPHOCYTES NFR BLD: 14.4 % (ref 25–40)
MCH RBC QN AUTO: 24.8 PG (ref 27–31.2)
MCHC RBC AUTO-ENTMCNC: 32 G/DL (ref 31.8–35.4)
MCV RBC AUTO: 77.5 FL (ref 80–97)
MICROCYTES BLD QL SMEAR: NORMAL
MONOCYTES NFR BLD: 6.6 % (ref 1–15)
NEUTROPHILS # BLD AUTO: 4.03 10*3/UL (ref 1.8–7.7)
NEUTROPHILS NFR BLD: 73.6 % (ref 37–80)
NITRITE UR QL STRIP: NEGATIVE
NUCLEATED RED BLOOD CELLS: 0 %
PH UR STRIP: 7 PH (ref 5–9)
PLATELETS: 272 10*3/UL (ref 142–424)
POTASSIUM SERPL-SCNC: 3.6 MMOL/L (ref 3.6–5.2)
PROT UR QL STRIP: NEGATIVE MG/DL
RBC # BLD AUTO: 5.24 10*6/UL (ref 4.2–5.4)
SODIUM BLD-SCNC: 140 MMOL/L (ref 135–145)
SP GR UR STRIP: 1 (ref 1–1.03)
SPECIMEN COLLECTION METHOD, URINE: NORMAL
UROBILINOGEN UR STRIP-ACNC: NORMAL MG/DL
WBC # BLD: 5.5 10*3/UL (ref 4.6–10.2)

## 2021-10-08 ENCOUNTER — OUTSIDE PLACE OF SERVICE (OUTPATIENT)
Dept: UROLOGY | Facility: CLINIC | Age: 36
End: 2021-10-08
Payer: MEDICAID

## 2021-10-08 ENCOUNTER — TELEPHONE (OUTPATIENT)
Dept: UROLOGY | Facility: CLINIC | Age: 36
End: 2021-10-08

## 2021-10-08 ENCOUNTER — PATIENT MESSAGE (OUTPATIENT)
Dept: UROLOGY | Facility: CLINIC | Age: 36
End: 2021-10-08

## 2021-10-08 PROCEDURE — 52352 PR CYSTO/URETERO/PYELOSCOPY, CALCULUS TX: ICD-10-PCS | Mod: RT,,, | Performed by: UROLOGY

## 2021-10-08 PROCEDURE — 52352 CYSTOURETERO W/STONE REMOVE: CPT | Mod: RT,,, | Performed by: UROLOGY

## 2021-10-08 PROCEDURE — 52332 CYSTOSCOPY AND TREATMENT: CPT | Mod: 51,RT,, | Performed by: UROLOGY

## 2021-10-08 PROCEDURE — 74420 UROGRAPHY RTRGR +-KUB: CPT | Mod: 26,,, | Performed by: UROLOGY

## 2021-10-08 PROCEDURE — 52332 PR CYSTOSCOPY,INSERT URETERAL STENT: ICD-10-PCS | Mod: 51,RT,, | Performed by: UROLOGY

## 2021-10-08 PROCEDURE — 74420 PR  X-RAY RETROGRADE PYELOGRAM: ICD-10-PCS | Mod: 26,,, | Performed by: UROLOGY

## 2021-10-11 ENCOUNTER — TELEPHONE (OUTPATIENT)
Dept: UROLOGY | Facility: CLINIC | Age: 36
End: 2021-10-11

## 2021-10-11 DIAGNOSIS — N20.0 KIDNEY STONE: Primary | ICD-10-CM

## 2021-10-12 LAB
CALCULI COMPOSITION: NORMAL
CALCULI DESCRIPTION: NORMAL
CALCULI MASS: 82 MG
CALCULI NUMBER: 2
CALCULI PDF: NORMAL
CALCULI SIZE: NORMAL MM

## 2021-10-20 ENCOUNTER — TELEPHONE (OUTPATIENT)
Dept: UROLOGY | Facility: CLINIC | Age: 36
End: 2021-10-20

## 2021-10-21 ENCOUNTER — PATIENT MESSAGE (OUTPATIENT)
Dept: UROLOGY | Facility: CLINIC | Age: 36
End: 2021-10-21

## 2021-10-21 ENCOUNTER — PROCEDURE VISIT (OUTPATIENT)
Dept: UROLOGY | Facility: CLINIC | Age: 36
End: 2021-10-21
Payer: MEDICAID

## 2021-10-21 VITALS — WEIGHT: 150 LBS | BODY MASS INDEX: 25.61 KG/M2 | HEIGHT: 64 IN

## 2021-10-21 DIAGNOSIS — N20.0 KIDNEY STONE: ICD-10-CM

## 2021-10-21 PROCEDURE — 52310 CYSTOSCOPY: ICD-10-PCS | Mod: S$GLB,,, | Performed by: UROLOGY

## 2021-10-21 PROCEDURE — 52310 CYSTOSCOPY AND TREATMENT: CPT | Mod: S$GLB,,, | Performed by: UROLOGY

## 2021-10-22 ENCOUNTER — PATIENT MESSAGE (OUTPATIENT)
Dept: UROLOGY | Facility: CLINIC | Age: 36
End: 2021-10-22
Payer: MEDICAID

## 2021-10-22 RX ORDER — HYOSCYAMINE SULFATE 0.125 MG
125 TABLET ORAL EVERY 6 HOURS PRN
Qty: 10 TABLET | Refills: 0 | Status: SHIPPED | OUTPATIENT
Start: 2021-10-22 | End: 2022-06-27

## 2022-01-05 ENCOUNTER — TELEPHONE (OUTPATIENT)
Dept: UROLOGY | Facility: CLINIC | Age: 37
End: 2022-01-05
Payer: MEDICAID

## 2022-01-07 ENCOUNTER — OFFICE VISIT (OUTPATIENT)
Dept: UROLOGY | Facility: CLINIC | Age: 37
End: 2022-01-07
Payer: MEDICAID

## 2022-01-07 ENCOUNTER — HOSPITAL ENCOUNTER (OUTPATIENT)
Dept: RADIOLOGY | Facility: CLINIC | Age: 37
Discharge: HOME | End: 2022-01-07
Attending: UROLOGY
Payer: MEDICAID

## 2022-01-07 DIAGNOSIS — N20.0 KIDNEY STONE: ICD-10-CM

## 2022-01-07 DIAGNOSIS — N20.0 KIDNEY STONE: Primary | ICD-10-CM

## 2022-01-07 PROCEDURE — 74018 RADEX ABDOMEN 1 VIEW: CPT | Mod: TC,,, | Performed by: UROLOGY

## 2022-01-07 PROCEDURE — 74018 XR ABDOMEN AP 1 VIEW: ICD-10-PCS | Mod: 26,,, | Performed by: RADIOLOGY

## 2022-01-07 PROCEDURE — 99214 PR OFFICE/OUTPT VISIT, EST, LEVL IV, 30-39 MIN: ICD-10-PCS | Mod: S$GLB,,, | Performed by: UROLOGY

## 2022-01-07 PROCEDURE — 74018 RADEX ABDOMEN 1 VIEW: CPT | Mod: 26,,, | Performed by: RADIOLOGY

## 2022-01-07 PROCEDURE — 74018 XR ABDOMEN AP 1 VIEW: ICD-10-PCS | Mod: TC,,, | Performed by: UROLOGY

## 2022-01-07 PROCEDURE — 99214 OFFICE O/P EST MOD 30 MIN: CPT | Mod: S$GLB,,, | Performed by: UROLOGY

## 2022-01-07 PROCEDURE — 1159F MED LIST DOCD IN RCRD: CPT | Mod: CPTII,S$GLB,, | Performed by: UROLOGY

## 2022-01-07 PROCEDURE — 1160F RVW MEDS BY RX/DR IN RCRD: CPT | Mod: CPTII,S$GLB,, | Performed by: UROLOGY

## 2022-01-07 PROCEDURE — 1160F PR REVIEW ALL MEDS BY PRESCRIBER/CLIN PHARMACIST DOCUMENTED: ICD-10-PCS | Mod: CPTII,S$GLB,, | Performed by: UROLOGY

## 2022-01-07 PROCEDURE — 1159F PR MEDICATION LIST DOCUMENTED IN MEDICAL RECORD: ICD-10-PCS | Mod: CPTII,S$GLB,, | Performed by: UROLOGY

## 2022-01-07 NOTE — PROGRESS NOTES
Subjective:       Patient ID: Sari Fernandez is a 36 y.o. female.    Chief Complaint: Nephrolithiasis (Wants to proceed with left side, had pain Wednesday but not today )      HPI:  36-year-old female with multiple left-sided intrarenal stones which are symptomatic she is here for a planned.    Past Medical History:   Past Medical History:   Diagnosis Date    ADHD     ADHD     Anemia     Anxiety     Anxiety     Urinary tract infection        Past Surgical Historical:   Past Surgical History:   Procedure Laterality Date     SECTION, LOW TRANSVERSE      KIDNEY STONE SURGERY          Medications:   Medication List with Changes/Refills   Current Medications    HYOSCYAMINE (ANASPAZ,LEVSIN) 0.125 MG TAB    Take 1 tablet (125 mcg total) by mouth every 6 (six) hours as needed (spasms).    PRENATABS RX 29 MG IRON- 1 MG TAB    Take 1 tablet by mouth once daily.    PRENATAL VITAMIN #49-IRON-FA 6.75 MG IRON- 200 MCG TAB    Daily        Past Social History:   Social History     Socioeconomic History    Marital status: Single   Tobacco Use    Smoking status: Never Smoker    Smokeless tobacco: Never Used   Substance and Sexual Activity    Alcohol use: Not Currently    Drug use: Not Currently    Sexual activity: Not Currently     Partners: Male       Allergies: Review of patient's allergies indicates:  No Known Allergies     Family History:   Family History   Problem Relation Age of Onset    Cancer Paternal Grandmother     Cancer Father         Review of Systems:  Review of Systems   Constitutional: Negative for activity change and appetite change.   HENT: Negative for congestion and dental problem.    Respiratory: Negative for chest tightness and shortness of breath.    Cardiovascular: Negative for chest pain.   Gastrointestinal: Negative for abdominal distention and abdominal pain.   Genitourinary: Negative for decreased urine volume, difficulty urinating, dyspareunia, dysuria, enuresis, flank pain,  frequency, genital sores, hematuria, pelvic pain and urgency.   Musculoskeletal: Negative for back pain and neck pain.   Allergic/Immunologic: Negative for immunocompromised state.   Neurological: Negative for dizziness.   Hematological: Negative for adenopathy.   Psychiatric/Behavioral: Negative for agitation, behavioral problems and confusion.       Physical Exam:  Physical Exam  Constitutional:       Appearance: She is well-developed and well-nourished.   HENT:      Head: Normocephalic and atraumatic.      Right Ear: External ear normal.      Left Ear: External ear normal.   Eyes:      Extraocular Movements: EOM normal.      Conjunctiva/sclera: Conjunctivae normal.   Neck:      Vascular: No JVD.   Cardiovascular:      Rate and Rhythm: Normal rate and regular rhythm.   Pulmonary:      Effort: Pulmonary effort is normal. No respiratory distress.      Breath sounds: Normal breath sounds. No wheezing.   Abdominal:      General: There is no distension.      Palpations: Abdomen is soft.      Tenderness: There is no abdominal tenderness. There is no rebound.   Musculoskeletal:         General: Normal range of motion.      Cervical back: Normal range of motion.   Skin:     General: Skin is warm and dry.      Findings: No erythema or rash.   Neurological:      Mental Status: She is alert and oriented to person, place, and time.   Psychiatric:         Mood and Affect: Mood and affect normal.         Behavior: Behavior normal.         Assessment/Plan:       Problem List Items Addressed This Visit    None     Visit Diagnoses     Kidney stone    -  Primary    Relevant Orders    X-Ray Abdomen AP 1 View (Completed)    Ambulatory Referral to External Surgery             Multiple left renal stones:  Will proceed with left ESWL next available date

## 2022-01-11 LAB
ANION GAP SERPL CALC-SCNC: 10 MMOL/L (ref 3–11)
APPEARANCE, UA: CLEAR
B-HCG UR QL: NEGATIVE
BASOPHILS NFR BLD: 0.5 % (ref 0–3)
BILIRUB UR QL STRIP: NEGATIVE MG/DL
BUN SERPL-MCNC: 16 MG/DL (ref 7–18)
BUN/CREAT SERPL: 19.75 RATIO (ref 7–18)
CALCIUM SERPL-MCNC: 9.2 MG/DL (ref 8.8–10.5)
CHLORIDE SERPL-SCNC: 107 MMOL/L (ref 100–108)
CO2 SERPL-SCNC: 25 MMOL/L (ref 21–32)
COLOR UR: NORMAL
CREAT SERPL-MCNC: 0.81 MG/DL (ref 0.55–1.02)
EOSINOPHIL NFR BLD: 5.4 % (ref 1–3)
ERYTHROCYTE [DISTWIDTH] IN BLOOD BY AUTOMATED COUNT: 14.4 % (ref 12.5–18)
GFR ESTIMATION: > 60
GLUCOSE (UA): NORMAL MG/DL
GLUCOSE SERPL-MCNC: 99 MG/DL (ref 70–110)
HCT VFR BLD AUTO: 38.9 % (ref 37–47)
HGB BLD-MCNC: 12.9 G/DL (ref 12–16)
HGB UR QL STRIP: NEGATIVE /UL
KETONES UR QL STRIP: NEGATIVE MG/DL
LEUKOCYTE ESTERASE UR QL STRIP: NEGATIVE /UL
LYMPHOCYTES NFR BLD: 29.8 % (ref 25–40)
MCH RBC QN AUTO: 27.2 PG (ref 27–31.2)
MCHC RBC AUTO-ENTMCNC: 33.2 G/DL (ref 31.8–35.4)
MCV RBC AUTO: 82.1 FL (ref 80–97)
MONOCYTES NFR BLD: 6.9 % (ref 1–15)
NEUTROPHILS # BLD AUTO: 2.42 10*3/UL (ref 1.8–7.7)
NEUTROPHILS NFR BLD: 57.2 % (ref 37–80)
NITRITE UR QL STRIP: NEGATIVE
NUCLEATED RED BLOOD CELLS: 0 %
PH UR STRIP: 6.5 PH (ref 5–9)
PLATELETS: 325 10*3/UL (ref 142–424)
POTASSIUM SERPL-SCNC: 4.1 MMOL/L (ref 3.6–5.2)
PROT UR QL STRIP: NEGATIVE MG/DL
RBC # BLD AUTO: 4.74 10*6/UL (ref 4.2–5.4)
SODIUM BLD-SCNC: 142 MMOL/L (ref 135–145)
SP GR UR STRIP: 1.01 (ref 1–1.03)
SPECIMEN COLLECTION METHOD, URINE: NORMAL
UROBILINOGEN UR STRIP-ACNC: NORMAL MG/DL
WBC # BLD: 4.2 10*3/UL (ref 4.6–10.2)

## 2022-01-19 ENCOUNTER — PATIENT MESSAGE (OUTPATIENT)
Dept: UROLOGY | Facility: CLINIC | Age: 37
End: 2022-01-19

## 2022-01-19 ENCOUNTER — OUTSIDE PLACE OF SERVICE (OUTPATIENT)
Dept: UROLOGY | Facility: CLINIC | Age: 37
End: 2022-01-19
Payer: MEDICAID

## 2022-01-19 PROCEDURE — 50590 FRAGMENTING OF KIDNEY STONE: CPT | Mod: LT,,, | Performed by: UROLOGY

## 2022-01-19 PROCEDURE — 50590 PR FRAGMENT KIDNEY STONE/ ESWL: ICD-10-PCS | Mod: LT,,, | Performed by: UROLOGY

## 2022-01-20 ENCOUNTER — PATIENT MESSAGE (OUTPATIENT)
Dept: UROLOGY | Facility: CLINIC | Age: 37
End: 2022-01-20
Payer: MEDICAID

## 2022-01-20 RX ORDER — AMOXICILLIN 500 MG/1
500 CAPSULE ORAL 2 TIMES DAILY
Qty: 14 CAPSULE | Refills: 0 | Status: SHIPPED | OUTPATIENT
Start: 2022-01-20 | End: 2022-01-27

## 2022-01-21 ENCOUNTER — PATIENT MESSAGE (OUTPATIENT)
Dept: UROLOGY | Facility: CLINIC | Age: 37
End: 2022-01-21
Payer: MEDICAID

## 2022-01-24 ENCOUNTER — CLINICAL SUPPORT (OUTPATIENT)
Dept: HEMATOLOGY/ONCOLOGY | Facility: CLINIC | Age: 37
End: 2022-01-24
Payer: MEDICAID

## 2022-01-24 DIAGNOSIS — R09.81 NASAL CONGESTION: Primary | ICD-10-CM

## 2022-01-24 LAB
CTP QC/QA: YES
SARS-COV-2 AG RESP QL IA.RAPID: NEGATIVE

## 2022-01-24 PROCEDURE — 87811 SARS CORONAVIRUS 2 ANTIGEN POCT, MANUAL READ: ICD-10-PCS | Mod: QW,S$GLB,CS, | Performed by: INTERNAL MEDICINE

## 2022-01-24 PROCEDURE — 87811 SARS-COV-2 COVID19 W/OPTIC: CPT | Mod: QW,S$GLB,CS, | Performed by: INTERNAL MEDICINE

## 2022-02-04 ENCOUNTER — OFFICE VISIT (OUTPATIENT)
Dept: UROLOGY | Facility: CLINIC | Age: 37
End: 2022-02-04
Payer: MEDICAID

## 2022-02-04 ENCOUNTER — HOSPITAL ENCOUNTER (OUTPATIENT)
Dept: RADIOLOGY | Facility: CLINIC | Age: 37
Discharge: HOME | End: 2022-02-04
Attending: NURSE PRACTITIONER
Payer: MEDICAID

## 2022-02-04 VITALS
RESPIRATION RATE: 16 BRPM | HEART RATE: 75 BPM | SYSTOLIC BLOOD PRESSURE: 118 MMHG | OXYGEN SATURATION: 99 % | DIASTOLIC BLOOD PRESSURE: 70 MMHG

## 2022-02-04 DIAGNOSIS — N20.0 KIDNEY STONE: Primary | ICD-10-CM

## 2022-02-04 DIAGNOSIS — N20.0 KIDNEY STONE: ICD-10-CM

## 2022-02-04 DIAGNOSIS — Z98.890: ICD-10-CM

## 2022-02-04 PROBLEM — Z98.891 STATUS POST CESAREAN SECTION: Status: ACTIVE | Noted: 2022-02-04

## 2022-02-04 PROBLEM — N61.0 MASTITIS, LEFT, ACUTE: Status: ACTIVE | Noted: 2022-02-04

## 2022-02-04 PROBLEM — O23.40 UTI (URINARY TRACT INFECTION) DURING PREGNANCY: Status: ACTIVE | Noted: 2022-02-04

## 2022-02-04 PROCEDURE — 1159F PR MEDICATION LIST DOCUMENTED IN MEDICAL RECORD: ICD-10-PCS | Mod: CPTII,S$GLB,, | Performed by: NURSE PRACTITIONER

## 2022-02-04 PROCEDURE — 3078F PR MOST RECENT DIASTOLIC BLOOD PRESSURE < 80 MM HG: ICD-10-PCS | Mod: CPTII,S$GLB,, | Performed by: NURSE PRACTITIONER

## 2022-02-04 PROCEDURE — 74018 RADEX ABDOMEN 1 VIEW: CPT | Mod: TC,,, | Performed by: UROLOGY

## 2022-02-04 PROCEDURE — 99024 POSTOP FOLLOW-UP VISIT: CPT | Mod: S$GLB,,, | Performed by: NURSE PRACTITIONER

## 2022-02-04 PROCEDURE — 3074F PR MOST RECENT SYSTOLIC BLOOD PRESSURE < 130 MM HG: ICD-10-PCS | Mod: CPTII,S$GLB,, | Performed by: NURSE PRACTITIONER

## 2022-02-04 PROCEDURE — 3074F SYST BP LT 130 MM HG: CPT | Mod: CPTII,S$GLB,, | Performed by: NURSE PRACTITIONER

## 2022-02-04 PROCEDURE — 74018 XR ABDOMEN AP 1 VIEW: ICD-10-PCS | Mod: TC,,, | Performed by: UROLOGY

## 2022-02-04 PROCEDURE — 1160F PR REVIEW ALL MEDS BY PRESCRIBER/CLIN PHARMACIST DOCUMENTED: ICD-10-PCS | Mod: CPTII,S$GLB,, | Performed by: NURSE PRACTITIONER

## 2022-02-04 PROCEDURE — 74018 XR ABDOMEN AP 1 VIEW: ICD-10-PCS | Mod: 26,,, | Performed by: RADIOLOGY

## 2022-02-04 PROCEDURE — 99024 PR POST-OP FOLLOW-UP VISIT: ICD-10-PCS | Mod: S$GLB,,, | Performed by: NURSE PRACTITIONER

## 2022-02-04 PROCEDURE — 3078F DIAST BP <80 MM HG: CPT | Mod: CPTII,S$GLB,, | Performed by: NURSE PRACTITIONER

## 2022-02-04 PROCEDURE — 74018 RADEX ABDOMEN 1 VIEW: CPT | Mod: 26,,, | Performed by: RADIOLOGY

## 2022-02-04 PROCEDURE — 1160F RVW MEDS BY RX/DR IN RCRD: CPT | Mod: CPTII,S$GLB,, | Performed by: NURSE PRACTITIONER

## 2022-02-04 PROCEDURE — 1159F MED LIST DOCD IN RCRD: CPT | Mod: CPTII,S$GLB,, | Performed by: NURSE PRACTITIONER

## 2022-02-09 LAB
COMPONENT 1: NORMAL
NIDUS STONE QL: NORMAL
SPECIMEN SOURCE: NORMAL
WT STONE: 0.01 G

## 2022-05-09 ENCOUNTER — OFFICE VISIT (OUTPATIENT)
Dept: PRIMARY CARE CLINIC | Facility: CLINIC | Age: 37
End: 2022-05-09
Payer: MEDICAID

## 2022-05-09 VITALS
RESPIRATION RATE: 18 BRPM | HEIGHT: 64 IN | BODY MASS INDEX: 25.95 KG/M2 | DIASTOLIC BLOOD PRESSURE: 83 MMHG | SYSTOLIC BLOOD PRESSURE: 121 MMHG | HEART RATE: 76 BPM | WEIGHT: 152 LBS | OXYGEN SATURATION: 99 %

## 2022-05-09 DIAGNOSIS — Z13.29 THYROID DISORDER SCREEN: ICD-10-CM

## 2022-05-09 DIAGNOSIS — F41.9 ANXIETY: ICD-10-CM

## 2022-05-09 DIAGNOSIS — F90.9 ADULT ATTENTION DEFICIT HYPERACTIVITY DISORDER: Primary | ICD-10-CM

## 2022-05-09 DIAGNOSIS — Z87.442 HISTORY OF KIDNEY STONES: ICD-10-CM

## 2022-05-09 DIAGNOSIS — Z76.89 ENCOUNTER TO ESTABLISH CARE: ICD-10-CM

## 2022-05-09 PROBLEM — N61.0 MASTITIS, LEFT, ACUTE: Status: RESOLVED | Noted: 2022-02-04 | Resolved: 2022-05-09

## 2022-05-09 PROBLEM — N17.9 ACUTE RENAL FAILURE: Status: RESOLVED | Noted: 2021-09-30 | Resolved: 2022-05-09

## 2022-05-09 PROCEDURE — 3079F PR MOST RECENT DIASTOLIC BLOOD PRESSURE 80-89 MM HG: ICD-10-PCS | Mod: CPTII,S$GLB,, | Performed by: NURSE PRACTITIONER

## 2022-05-09 PROCEDURE — 3008F BODY MASS INDEX DOCD: CPT | Mod: CPTII,S$GLB,, | Performed by: NURSE PRACTITIONER

## 2022-05-09 PROCEDURE — 1159F PR MEDICATION LIST DOCUMENTED IN MEDICAL RECORD: ICD-10-PCS | Mod: CPTII,S$GLB,, | Performed by: NURSE PRACTITIONER

## 2022-05-09 PROCEDURE — 3008F PR BODY MASS INDEX (BMI) DOCUMENTED: ICD-10-PCS | Mod: CPTII,S$GLB,, | Performed by: NURSE PRACTITIONER

## 2022-05-09 PROCEDURE — 1159F MED LIST DOCD IN RCRD: CPT | Mod: CPTII,S$GLB,, | Performed by: NURSE PRACTITIONER

## 2022-05-09 PROCEDURE — 1160F RVW MEDS BY RX/DR IN RCRD: CPT | Mod: CPTII,S$GLB,, | Performed by: NURSE PRACTITIONER

## 2022-05-09 PROCEDURE — 3074F PR MOST RECENT SYSTOLIC BLOOD PRESSURE < 130 MM HG: ICD-10-PCS | Mod: CPTII,S$GLB,, | Performed by: NURSE PRACTITIONER

## 2022-05-09 PROCEDURE — 3074F SYST BP LT 130 MM HG: CPT | Mod: CPTII,S$GLB,, | Performed by: NURSE PRACTITIONER

## 2022-05-09 PROCEDURE — 1160F PR REVIEW ALL MEDS BY PRESCRIBER/CLIN PHARMACIST DOCUMENTED: ICD-10-PCS | Mod: CPTII,S$GLB,, | Performed by: NURSE PRACTITIONER

## 2022-05-09 PROCEDURE — 3079F DIAST BP 80-89 MM HG: CPT | Mod: CPTII,S$GLB,, | Performed by: NURSE PRACTITIONER

## 2022-05-09 PROCEDURE — 99204 OFFICE O/P NEW MOD 45 MIN: CPT | Mod: S$GLB,,, | Performed by: NURSE PRACTITIONER

## 2022-05-09 PROCEDURE — 99204 PR OFFICE/OUTPT VISIT, NEW, LEVL IV, 45-59 MIN: ICD-10-PCS | Mod: S$GLB,,, | Performed by: NURSE PRACTITIONER

## 2022-05-09 NOTE — PATIENT INSTRUCTIONS
RTC in 1 month for F/U or sooner if needed.    Keep appts with specialists as scheduled.    Instructed patient to report to nearest ER or call 911 if begins to have difficulty breathing, turning blue, chest pain, B/P < 80/60 or >170/100, palpitations, syncope, extreme weakness, or severe H/A. Patient verbalized understanding.      Patient Education       Anxiety Discharge Instructions, Adult   About this topic   Anxiety can cause you to feel very worried. It can also cause physical symptoms like chest pain, stomach aches, or trouble sleeping. While mild anxiety is a normal response to stress, it can cause you problems in your everyday life. You may need follow-up care to help manage your anxiety. Anxiety happens in many forms, like:  Being scared all the time that something bad is going to happen. This is generalized anxiety.  Strong bursts of fear where your body has signs that may feel like a heart attack. This is called a panic attack.  Upsetting thoughts that happen often. There is a need to repeat doing certain things to help get rid of the anxiety caused by these thoughts. The thoughts or actions may be about checking on things, touching things, or worry about germs. This is an obsessive-compulsive disorder.  Strong fear of an object, place, or condition. This is a phobia.  Fear that others think bad things about you or being put down by other people. This is social anxiety.  Nightmares, flashbacks, staying away from people, or having panic attacks when reminded of a shocking or hurtful time or place from the past. This is post-traumatic stress.  Anxiety disorder may be treated in many ways. Some kinds of treatment have you talk about your beliefs, fears, and worries. You may learn how certain thoughts or feelings can raise anxiety. You may also learn what steps to take to lower anxiety. Other kinds of treatment may have you look back on a hurtful event, sad memory, or something you are afraid of. The doctor  will help you deal with the feelings that you may have. You may learn ways to cope with unwanted events or thoughts by looking at your fears in a way that feels safe.  What care is needed at home?   Ask your doctor what you need to do when you go home. Make sure you ask questions if you do not understand what the doctor says.  Set a time to talk with a counselor about your worries and feelings. This can help you with your anxiety.  Take care to follow all instructions when you take your medicines.  Limit alcohol and caffeine.  Learn ways to manage stress. Relaxation methods like reflection, deep breathing, and muscle relaxation may be helpful. Things like yoga, exercise, and maya chi are also good.  Talk about your feelings with family members and friends you trust. Talk to someone who can help you see how your thoughts at certain times may raise your anxiety.     What follow-up care is needed?   Your doctor may ask you to make visits to the office to check on your progress. Be sure to keep these visits.  What drugs may be needed?   The doctor may order drugs to help the physical signs of anxiety. Make sure that you take the drugs as taught to you by the doctor. Talk with your doctor about any side effects and ask how long you should take the drug.  Will physical activity be limited?   You may take part in physical activities. Some people are limited because of their anxiety or fear. Talk with your doctor about the right amount of activity for you.  What changes to diet are needed?   Eat a variety of healthy foods and limit drinks with caffeine. You should avoid alcohol, energy drinks, and over-the-counter stimulants.  What problems could happen?   If your anxiety is not treated, it can result in:  Staying away from work or social events  Not being able to do everyday tasks  Keeping away from family and friends  What can be done to prevent this health problem?   Learn what events, people, or things upset you. Limit  your contact with them.  Talk about your feelings. Talk to someone who can help you see how your thoughts at certain times may raise your anxiety.  Seek support from your friends and family. Find someone who calms you down. Ask if you can call them when you are getting anxious.  When do I need to call the doctor?   You feel you may harm yourself or someone else.  You can also call a mental health hotline for help.  You have any physical symptoms, such as chest pain, trouble breathing, or severe belly pain, that could be a sign of a serious problem.  If you are short of breath.  If you do not feel like you can be alone.  Teach Back: Helping You Understand   The Teach Back Method helps you understand the information we are giving you. After you talk with the staff, tell them in your own words what you learned. This helps to make sure the staff has described each thing clearly. It also helps to explain things that may have been confusing. Before going home, make sure you can do these:  I can tell you about my condition and the drugs I need to take.  I can tell you what may help lower my anxiety.  I can tell you what I will do if it is hard to breathe or I have chest pain.  I can tell you what I will do if I do not feel safe or cannot be alone.  Where can I learn more?   [facility]  https://www.chrissy.org/Learn-More/Mental-Health-Conditions/Anxiety-Disorders   National Health Service  https://www.nhs.uk/conditions/generalised-anxiety-disorder/symptoms/   [employer] ? [facility]  https://www.renny.nih.gov/health/relieving-stress-anxiety-shkcmixpw-cmlwwgcdfp-tthoxvxylu   National [employer] of Mental Health  http://www.nimh.nih.gov/health/publications/anxiety-disorders/complete-index.shtml   Last Reviewed Date   2021-06-08  Consumer Information Use and Disclaimer   This information is not specific medical advice and does not replace information you receive from your health care provider. This is only a brief  summary of general information. It does NOT include all information about conditions, illnesses, injuries, tests, procedures, treatments, therapies, discharge instructions or life-style choices that may apply to you. You must talk with your health care provider for complete information about your health and treatment options. This information should not be used to decide whether or not to accept your health care providers advice, instructions or recommendations. Only your health care provider has the knowledge and training to provide advice that is right for you.  Copyright   Copyright © 2021 Up[employer], Inc. and its affiliates and/or licensors. All rights reserved.  Patient Education       Attention Deficit Hyperactivity Disorder (ADHD) Discharge Instructions   About this topic   Attention deficit hyperactivity disorder is also called ADHD or ADD. Most often, this starts at a young age. This disorder makes it hard to focus or sit still. People with ADHD may find it hard to make good decisions. Children with ADHD may have trouble in school and at home. Adults may have problems at work. People with ADHD may also have a problem getting along well with others. While there is no cure for ADHD, you and your doctor can work on a plan that is best for you to treat the signs of ADHD.  What care is needed at home?   Ask your doctor what you need to do when you go home. Make sure you ask questions if you do not understand what the doctor says. This way you will know what you need to do.  Try to get enough sleep at night. Most often adults need 7 to 8 hours each night and children need 8 to 10 hours each night. Rest during the day if you are tired.  Eat and sleep at the same times every day.  Have a plan for where to keep things in your home. Use checklists, things to help you remember, and alarms. A list of things you may need to find in a hurry can be helpful. These can help you put things in the right place and manage your  time.  Work on getting better at reading and taking notes.  Have a space that is just for work or homework so you will be able to pay attention. This may be an office or a desk facing a wall. Try using headphones so you cannot hear the other noises.  Do one thing at a time. Keep a list of the next things you were planning to do or say.  Break large jobs or things you have to do into smaller jobs. This will make them easier to finish. Reward yourself along the way.  Have rules that are clear and easy to follow.   Look at where you are the strongest. Give rewards for good behavior, finished schoolwork, or for doing a good job at work.  Do not do too many things that might cause stress.  What follow-up care is needed?   Your doctor may ask you to make visits to the office to check on your progress. Be sure to keep these visits.  Your doctor may send you to a special mental health doctor. This person will talk with you about the problems you are having. Then, you can work together to find ways to help you manage them.  Your doctor may suggest you try talk therapy to help you live well with your ADHD.  What drugs may be needed?   The doctor may order drugs to:  Help lower your worry  Help you to pay attention  Help you be more calm  Help you be less impulsive  Help keep things in your life balanced  Care for low mood  Will physical activity be limited?   Physical activity will help keep your mind and body in good shape. Talk with your doctor about the right amount of activity for you.  What problems could happen?   Feeling badly about yourself  Raise the chances of you hurting yourself, such as injury in a car accident  Not do well in school and work  Trouble making friends or getting along well with others  Raise your chance to abuse alcohol or drugs  What can be done to prevent this health problem?   The cause of ADHD is not clear, but to lower the chance of your child having ADHD:  Do not drink beer, wine, or mixed  drinks (alcohol) while you are pregnant.  Do not smoke or use illegal drugs while you are pregnant.  Keep your child away from things like harmful toxins and chemicals.  Keep your child from having too much time in front of computers, TV, and video games.  Get plenty of exercise.  Be more aware of thoughts, emotions, and experiences each moment. This is mindfulness.  When do I need to call the doctor?   Drugs are not working  Symptoms are getting worse  Teach Back: Helping You Understand   The Teach Back Method helps you understand the information we are giving you. After you talk with the staff, tell them in your own words what you learned. This helps to make sure the staff has described each thing clearly. It also helps to explain things that may have been confusing. Before going home, make sure you can do these:  I can tell you about my condition.  I can tell you ways to help me pay attention.  I can tell you what I will do if I think my drugs are not working.  Where can I learn more?   HelpGuide.org  http://www.helpguide.org/articles/add-adhd/attention-deficit-disorder-adhd-parenting-tips.htm   KidsHealth  http://kidshealth.org/parent/medical/learning/adhd.html   [employer] [place] of Mental Health  http://www.nimh.nih.gov/health/topics/attention-deficit-hyperactivity-disorder-adhd/index.shtml   Last Reviewed Date   2020-06-14  Consumer Information Use and Disclaimer   This information is not specific medical advice and does not replace information you receive from your health care provider. This is only a brief summary of general information. It does NOT include all information about conditions, illnesses, injuries, tests, procedures, treatments, therapies, discharge instructions or life-style choices that may apply to you. You must talk with your health care provider for complete information about your health and treatment options. This information should not be used to decide whether or not to accept your  health care providers advice, instructions or recommendations. Only your health care provider has the knowledge and training to provide advice that is right for you.  Copyright   Copyright © 2021 UpToDate, Inc. and its affiliates and/or licensors. All rights reserved.  Patient Education       Yearly Physical for Adults   About this topic   Most people do not want to be sick. Having a checkup each year with your doctor is one way to help you stay healthy. You may need to see your doctor more or less often. How often you need to go to the doctor depends on your age. Your family and medical history also play a role in how often you need to go to the doctor. Going to see your doctor on a routine basis can help you find problems early or even before they start. This may make it easier to treat or cure your problem.  General   Your doctor will talk about many things during your checkup. Your doctor may ask about:  Your medical and family history.  All the drugs you are taking. Be sure to include all prescription, over the counter, and herbal supplements. Tell the doctor if you have any drug allergy. Bring a list of drugs you take with you.  How you are feeling and if you are having any problems.  Risky behaviors like smoking, drinking alcohol, using illegal drugs, not wearing seatbelts, having unprotected sex, etc.  Your doctor will do a physical exam and may check your:  Height and weight  Blood pressure  Reflexes  Memory  Vision  Hearing  Your doctor may order:  Lab tests  ECG to check your heart rhythm  X-rays  Tests or treatments based on your exam  What lifestyle changes are needed?   Your doctor may suggest you make changes to your lifestyle at this visit. The doctor may talk with you about being more active or lowering stress levels. Ask your doctor what you need to do.  What drugs may be needed?   Your doctor may order drugs or vaccines to protect you from illnesses.  What changes to diet are needed?   Talk to  your doctor to see if any changes are needed to your diet.  When do I need to call the doctor?   Call your doctor if you need to learn about any test results. Together you can make a plan for more care.  Helpful tips   Make a list of questions for your doctor before you go. This will help you remember to ask about any concerns. Write down any answers from your doctor so you can look over them after your visit.   Tell your doctor about any changes in your body or health since your last visit.  Ask your doctor about any screening tests you need.  Where can I learn more?   American Academy of Family Physicians  http://familydoctor.org/familydoctor/en/prevention-wellness/staying-healthy/healthy-living/preventive-services-for-healthy-living.printerview.html   Centers for Disease Control  http://www.cdc.gov/family/checkup/   Last Reviewed Date   2019-04-22  Consumer Information Use and Disclaimer   This information is not specific medical advice and does not replace information you receive from your health care provider. This is only a brief summary of general information. It does NOT include all information about conditions, illnesses, injuries, tests, procedures, treatments, therapies, discharge instructions or life-style choices that may apply to you. You must talk with your health care provider for complete information about your health and treatment options. This information should not be used to decide whether or not to accept your health care providers advice, instructions or recommendations. Only your health care provider has the knowledge and training to provide advice that is right for you.  Copyright   Copyright © 2021 [employer]Date, Inc. and its affiliates and/or licensors. All rights reserved.

## 2022-05-09 NOTE — PROGRESS NOTES
Subjective:       Patient ID: Sari Fernandez is a 36 y.o. female.    Chief Complaint: Establish Care    HPI: Sari is a 36 y.o. female who presents to establish care with a new PCP. Previously seen by Dr. Larios for GYN.    She had a baby boy 9 months ago delivered by Dr. Larios. She is currently breastfeeding him.    History of ADHD and Anxiety and was taking Adderall since a teen. She would like to start back on it after she stops breastfeeding. She has been prescibed Celexa for her anxiety but she has not taken the medication since she is breastfeeding. Denies being depressed.    Reports history of having multiple kidney stones so she is followed regularly by Dr. Turner and she is doing fine now with no recent kidney stones.    Only takes prenatal vitamins daily.    Denies drinking or smoking.    Refuses FLU and COVID vaccines.                    Past Medical History:   Diagnosis Date    ADHD     ADHD     Anemia     Anxiety     Anxiety     Urinary tract infection        Past Surgical History:   Procedure Laterality Date     SECTION, LOW TRANSVERSE      KIDNEY STONE SURGERY      LITHOTRIPSY         Family History   Problem Relation Age of Onset    Cancer Paternal Grandmother     Cancer Father        Social History     Tobacco Use    Smoking status: Never Smoker    Smokeless tobacco: Never Used   Substance Use Topics    Alcohol use: Not Currently    Drug use: Not Currently       Patient Active Problem List   Diagnosis    Adult attention deficit hyperactivity disorder    Anxiety disorder    Attention deficit hyperactivity disorder, predominantly inattentive type    Bacteremia    Fatigue    Hypertension affecting pregnancy    Hypokalemia    Iron deficiency anemia    Pregnancy    Screening for malignant neoplasm of cervix    Status post  delivery    Tobacco user    Urinary tract infection in mother during pregnancy    Status post  section    UTI (urinary  "tract infection) during pregnancy       Immunization History   Administered Date(s) Administered    DTaP 1987, 04/08/1988, 06/23/1988, 04/18/1989, 08/06/1992    HIB 06/23/1999    Hepatitis B, Pediatric/Adolescent 07/09/1998, 08/12/1998, 04/21/1999    IPV 1987, 04/08/1988, 06/23/1988, 04/18/1989, 08/06/1992    MMR 11/14/1988, 08/06/1992    Tdap 07/09/1998, 11/12/2008, 05/31/2021, 06/28/2021           Review of Systems   Constitutional: Negative for activity change, appetite change, chills, diaphoresis, fatigue and fever.   HENT: Negative for congestion, ear pain, sinus pain, tinnitus and trouble swallowing.    Eyes: Negative for pain and visual disturbance.   Respiratory: Negative for cough, chest tightness, shortness of breath and wheezing.    Cardiovascular: Negative for chest pain, palpitations and leg swelling.   Gastrointestinal: Negative for abdominal distention, abdominal pain, blood in stool, constipation, diarrhea, nausea and vomiting.   Endocrine: Negative for cold intolerance, heat intolerance, polydipsia, polyphagia and polyuria.   Genitourinary: Negative for decreased urine volume, dysuria, frequency, hematuria and urgency.   Musculoskeletal: Negative for back pain, gait problem and neck pain.   Skin: Negative for color change, pallor and rash.   Neurological: Negative for dizziness, syncope, weakness, light-headedness, numbness and headaches.   Hematological: Negative for adenopathy. Does not bruise/bleed easily.   Psychiatric/Behavioral: Negative for behavioral problems, confusion, dysphoric mood, self-injury and suicidal ideas. The patient is nervous/anxious.      Objective:     Vitals:    05/09/22 1217   BP: 121/83   BP Location: Left arm   Patient Position: Sitting   BP Method: Large (Automatic)   Pulse: 76   Resp: 18   SpO2: 99%   Weight: 68.9 kg (152 lb)   Height: 5' 4" (1.626 m)       Physical Exam  Vitals and nursing note reviewed.   Constitutional:       General: She is not in " acute distress.     Appearance: Normal appearance. She is not diaphoretic.   HENT:      Head: Normocephalic and atraumatic.      Nose: Nose normal.      Mouth/Throat:      Mouth: Mucous membranes are moist.      Pharynx: Oropharynx is clear.   Eyes:      Extraocular Movements: Extraocular movements intact.      Conjunctiva/sclera: Conjunctivae normal.      Pupils: Pupils are equal, round, and reactive to light.   Neck:      Thyroid: No thyromegaly or thyroid tenderness.   Cardiovascular:      Rate and Rhythm: Normal rate and regular rhythm.      Heart sounds: Normal heart sounds.   Pulmonary:      Effort: Pulmonary effort is normal.      Breath sounds: Normal breath sounds. No stridor. No wheezing or rales.   Abdominal:      General: Bowel sounds are normal. There is no distension.      Palpations: Abdomen is soft.      Tenderness: There is no abdominal tenderness.   Musculoskeletal:         General: No tenderness. Normal range of motion.      Cervical back: Normal range of motion and neck supple.      Right lower leg: No edema.      Left lower leg: No edema.   Lymphadenopathy:      Cervical: No cervical adenopathy.   Skin:     General: Skin is warm and dry.      Capillary Refill: Capillary refill takes less than 2 seconds.      Findings: No rash.   Neurological:      Mental Status: She is alert and oriented to person, place, and time.   Psychiatric:         Mood and Affect: Affect normal. Mood is anxious.         Behavior: Behavior normal. Behavior is cooperative.         Thought Content: Thought content normal. Thought content does not include homicidal or suicidal ideation. Thought content does not include homicidal or suicidal plan.         Judgment: Judgment normal.         Office Visit on 02/04/2022   Component Date Value Ref Range Status    Specimen Source 02/04/2022 KIDNEY STONE   Final    Component 1 02/04/2022    Final    Stone Weight 02/04/2022 0.010  g Final   Clinical Support on 01/24/2022    Component Date Value Ref Range Status    SARS Coronavirus 2 Antigen 01/24/2022 Negative  Negative Final     Acceptable 01/24/2022 Yes   Final   Orders Only on 01/11/2022   Component Date Value Ref Range Status    Sodium 01/11/2022 142  135 - 145 mmol/L Final    Potassium 01/11/2022 4.1  3.6 - 5.2 mmol/L Final    Chloride 01/11/2022 107  100 - 108 mmol/L Final    CO2 01/11/2022 25  21 - 32 mmol/L Final    Anion Gap 01/11/2022 10.0  3.0 - 11.0 mmol/L Final    BUN 01/11/2022 16  7 - 18 mg/dL Final    Creatinine 01/11/2022 0.81  0.55 - 1.02 mg/dL Final    GFR ESTIMATION 01/11/2022 > 60  >60 Final    BUN/Creatinine Ratio 01/11/2022 19.75 (A) 7 - 18 Ratio Final    Glucose 01/11/2022 99  70 - 110 mg/dL Final    Calcium 01/11/2022 9.2  8.8 - 10.5 mg/dL Final   Orders Only on 01/11/2022   Component Date Value Ref Range Status    Specimen Collection Method 01/11/2022 Cl Catch Mid Stream   Final    Color, UA 01/11/2022 Pale Yellow  Yellow Final    Appearance, UA 01/11/2022 CLEAR  Clear Final    pH, UA 01/11/2022 6.5  5.0 - 9.0 pH Final    Specific Gravity, UA 01/11/2022 1.015  1.000 - 1.030 Final    Protein, UA 01/11/2022 Negative  Negative mg/dL Final    Glucose, UA 01/11/2022 Normal  Normal mg/dL Final    Ketones, UA 01/11/2022 Negative  Negative mg/dL Final    Occult Blood UA 01/11/2022 Negative  Negative /uL Final    Nitrite, UA 01/11/2022 Negative  Negative Final    Bilirubin (UA) 01/11/2022 Negative  Negative mg/dL Final    Urobilinogen, UA 01/11/2022 Normal  Normal mg/dL Final    Leukocytes, UA 01/11/2022 Negative  Negative /uL Final    Preg Test, Ur 01/11/2022 Negative  Negative Final   Orders Only on 01/11/2022   Component Date Value Ref Range Status    WBC 01/11/2022 4.2 (A) 4.6 - 10.2 10*3/uL Final    RBC 01/11/2022 4.74  4.2 - 5.4 10*6/uL Final    Hemoglobin 01/11/2022 12.9  12.0 - 16.0 g/dL Final    Hematocrit 01/11/2022 38.9  37.0 - 47.0 % Final    MCV 01/11/2022  82.1  80 - 97 fL Final    MCH 01/11/2022 27.2  27.0 - 31.2 pg Final    MCHC 01/11/2022 33.2  31.8 - 35.4 g/dL Final    RDW RBC Auto-Rto 01/11/2022 14.4  12.5 - 18.0 % Final    Platelets 01/11/2022 325  142 - 424 10*3/uL Final    Neutrophils 01/11/2022 57.2  37 - 80 % Final    Lymphocytes 01/11/2022 29.8  25 - 40 % Final    Monocytes 01/11/2022 6.9  1 - 15 % Final    Eosinophils 01/11/2022 5.4 (A) 1 - 3 % Final    Basophils 01/11/2022 0.5  0 - 3 % Final    nRBC# 01/11/2022 0.0  % Final    Neutrophils Absolute 01/11/2022 2.42  1.8 - 7.7 10*3/uL Final         Assessment:      1. Adult attention deficit hyperactivity disorder    2. Encounter to establish care    3. Anxiety    4. History of kidney stones    5. Thyroid disorder screen          Plan:     Adult attention deficit hyperactivity disorder  Comments:  Will wait until she finishes breastfeeding to start a medication    Encounter to establish care  Comments:  Will review labs and determine POC based on results  Orders:  -     Lipid Panel; Future; Expected date: 05/09/2022  -     CBC Auto Differential; Future; Expected date: 05/09/2022  -     Comprehensive Metabolic Panel; Future; Expected date: 05/09/2022  -     TSH w/reflex to FT4; Future; Expected date: 05/09/2022    Anxiety  Comments:  Will wait until she finishes breastfeeding to start a medication    History of kidney stones    Thyroid disorder screen  -     TSH w/reflex to FT4; Future; Expected date: 05/09/2022         Current Outpatient Medications   Medication Sig Dispense Refill    hyoscyamine (ANASPAZ,LEVSIN) 0.125 mg Tab Take 1 tablet (125 mcg total) by mouth every 6 (six) hours as needed (spasms). 10 tablet 0     No current facility-administered medications for this visit.       Medications Discontinued During This Encounter   Medication Reason    PRENATABS RX 29 mg iron- 1 mg Tab Patient no longer taking    prenatal vitamin #[id number] 6.75 mg iron- 200 mcg Tab Patient no longer taking        Health Maintenance   Topic Date Due    Lipid Panel  Never done    TETANUS VACCINE  06/28/2031    Hepatitis C Screening  Completed       Patient Instructions   RTC in 1 month for F/U or sooner if needed.    Keep appts with specialists as scheduled.    Instructed patient to report to nearest ER or call 911 if begins to have difficulty breathing, turning blue, chest pain, B/P < 80/60 or >170/100, palpitations, syncope, extreme weakness, or severe H/A. Patient verbalized understanding.      Patient Education       Anxiety Discharge Instructions, Adult   About this topic   Anxiety can cause you to feel very worried. It can also cause physical symptoms like chest pain, stomach aches, or trouble sleeping. While mild anxiety is a normal response to stress, it can cause you problems in your everyday life. You may need follow-up care to help manage your anxiety. Anxiety happens in many forms, like:  · Being scared all the time that something bad is going to happen. This is generalized anxiety.  · Strong bursts of fear where your body has signs that may feel like a heart attack. This is called a panic attack.  · Upsetting thoughts that happen often. There is a need to repeat doing certain things to help get rid of the anxiety caused by these thoughts. The thoughts or actions may be about checking on things, touching things, or worry about germs. This is an obsessive-compulsive disorder.  · Strong fear of an object, place, or condition. This is a phobia.  · Fear that others think bad things about you or being put down by other people. This is social anxiety.  · Nightmares, flashbacks, staying away from people, or having panic attacks when reminded of a shocking or hurtful time or place from the past. This is post-traumatic stress.  Anxiety disorder may be treated in many ways. Some kinds of treatment have you talk about your beliefs, fears, and worries. You may learn how certain thoughts or feelings can raise anxiety.  You may also learn what steps to take to lower anxiety. Other kinds of treatment may have you look back on a hurtful event, sad memory, or something you are afraid of. The doctor will help you deal with the feelings that you may have. You may learn ways to cope with unwanted events or thoughts by looking at your fears in a way that feels safe.  What care is needed at home?   · Ask your doctor what you need to do when you go home. Make sure you ask questions if you do not understand what the doctor says.  · Set a time to talk with a counselor about your worries and feelings. This can help you with your anxiety.  · Take care to follow all instructions when you take your medicines.  · Limit alcohol and caffeine.  · Learn ways to manage stress. Relaxation methods like reflection, deep breathing, and muscle relaxation may be helpful. Things like yoga, exercise, and maya chi are also good.  · Talk about your feelings with family members and friends you trust. Talk to someone who can help you see how your thoughts at certain times may raise your anxiety.     What follow-up care is needed?   Your doctor may ask you to make visits to the office to check on your progress. Be sure to keep these visits.  What drugs may be needed?   The doctor may order drugs to help the physical signs of anxiety. Make sure that you take the drugs as taught to you by the doctor. Talk with your doctor about any side effects and ask how long you should take the drug.  Will physical activity be limited?   You may take part in physical activities. Some people are limited because of their anxiety or fear. Talk with your doctor about the right amount of activity for you.  What changes to diet are needed?   Eat a variety of healthy foods and limit drinks with caffeine. You should avoid alcohol, energy drinks, and over-the-counter stimulants.  What problems could happen?   If your anxiety is not treated, it can result in:  · Staying away from work or  social events  · Not being able to do everyday tasks  · Keeping away from family and friends  What can be done to prevent this health problem?   · Learn what events, people, or things upset you. Limit your contact with them.  · Talk about your feelings. Talk to someone who can help you see how your thoughts at certain times may raise your anxiety.  · Seek support from your friends and family. Find someone who calms you down. Ask if you can call them when you are getting anxious.  When do I need to call the doctor?   · You feel you may harm yourself or someone else.  · You can also call a mental health hotline for help.  · You have any physical symptoms, such as chest pain, trouble breathing, or severe belly pain, that could be a sign of a serious problem.  · If you are short of breath.  · If you do not feel like you can be alone.  Teach Back: Helping You Understand   The Teach Back Method helps you understand the information we are giving you. After you talk with the staff, tell them in your own words what you learned. This helps to make sure the staff has described each thing clearly. It also helps to explain things that may have been confusing. Before going home, make sure you can do these:  · I can tell you about my condition and the drugs I need to take.  · I can tell you what may help lower my anxiety.  · I can tell you what I will do if it is hard to breathe or I have chest pain.  · I can tell you what I will do if I do not feel safe or cannot be alone.  Where can I learn more?   [facility]  https://www.chrissy.org/Learn-More/Mental-Health-Conditions/Anxiety-Disorders   National Health Service  https://www.nhs.uk/conditions/generalised-anxiety-disorder/symptoms/   [employer] [facility] ? [facility]  https://www.renny.nih.gov/health/relieving-stress-anxiety-omacyprya-atytnvrtoc-gfxptxpgif   [employer] [facility]  http://www.nimh.nih.gov/health/publications/anxiety-disorders/complete-index.shtml    Last Reviewed Date   2021-06-08  Consumer Information Use and Disclaimer   This information is not specific medical advice and does not replace information you receive from your health care provider. This is only a brief summary of general information. It does NOT include all information about conditions, illnesses, injuries, tests, procedures, treatments, therapies, discharge instructions or life-style choices that may apply to you. You must talk with your health care provider for complete information about your health and treatment options. This information should not be used to decide whether or not to accept your health care providers advice, instructions or recommendations. Only your health care provider has the knowledge and training to provide advice that is right for you.  Copyright   Copyright © 2021 [employer] and its affiliates and/or licensors. All rights reserved.  Patient Education       Attention Deficit Hyperactivity Disorder (ADHD) Discharge Instructions   About this topic   Attention deficit hyperactivity disorder is also called ADHD or ADD. Most often, this starts at a young age. This disorder makes it hard to focus or sit still. People with ADHD may find it hard to make good decisions. Children with ADHD may have trouble in school and at home. Adults may have problems at work. People with ADHD may also have a problem getting along well with others. While there is no cure for ADHD, you and your doctor can work on a plan that is best for you to treat the signs of ADHD.  What care is needed at home?   · Ask your doctor what you need to do when you go home. Make sure you ask questions if you do not understand what the doctor says. This way you will know what you need to do.  · Try to get enough sleep at night. Most often adults need 7 to 8 hours each night and children need 8 to 10 hours each night. Rest during the day if you are tired.  · Eat and sleep at the same times every day.  · Have a  plan for where to keep things in your home. Use checklists, things to help you remember, and alarms. A list of things you may need to find in a hurry can be helpful. These can help you put things in the right place and manage your time.  · Work on getting better at reading and taking notes.  · Have a space that is just for work or homework so you will be able to pay attention. This may be an office or a desk facing a wall. Try using headphones so you cannot hear the other noises.  · Do one thing at a time. Keep a list of the next things you were planning to do or say.  · Break large jobs or things you have to do into smaller jobs. This will make them easier to finish. Reward yourself along the way.  · Have rules that are clear and easy to follow.   · Look at where you are the strongest. Give rewards for good behavior, finished schoolwork, or for doing a good job at work.  · Do not do too many things that might cause stress.  What follow-up care is needed?   · Your doctor may ask you to make visits to the office to check on your progress. Be sure to keep these visits.  · Your doctor may send you to a special mental health doctor. This person will talk with you about the problems you are having. Then, you can work together to find ways to help you manage them.  · Your doctor may suggest you try talk therapy to help you live well with your ADHD.  What drugs may be needed?   The doctor may order drugs to:  · Help lower your worry  · Help you to pay attention  · Help you be more calm  · Help you be less impulsive  · Help keep things in your life balanced  · Care for low mood  Will physical activity be limited?   Physical activity will help keep your mind and body in good shape. Talk with your doctor about the right amount of activity for you.  What problems could happen?   · Feeling badly about yourself  · Raise the chances of you hurting yourself, such as injury in a car accident  · Not do well in school and  work  · Trouble making friends or getting along well with others  · Raise your chance to abuse alcohol or drugs  What can be done to prevent this health problem?   The cause of ADHD is not clear, but to lower the chance of your child having ADHD:  · Do not drink beer, wine, or mixed drinks (alcohol) while you are pregnant.  · Do not smoke or use illegal drugs while you are pregnant.  · Keep your child away from things like harmful toxins and chemicals.  · Keep your child from having too much time in front of computers, TV, and video games.  · Get plenty of exercise.  · Be more aware of thoughts, emotions, and experiences each moment. This is mindfulness.  When do I need to call the doctor?   · Drugs are not working  · Symptoms are getting worse  Teach Back: Helping You Understand   The Teach Back Method helps you understand the information we are giving you. After you talk with the staff, tell them in your own words what you learned. This helps to make sure the staff has described each thing clearly. It also helps to explain things that may have been confusing. Before going home, make sure you can do these:  · I can tell you about my condition.  · I can tell you ways to help me pay attention.  · I can tell you what I will do if I think my drugs are not working.  Where can I learn more?   HelpGuide.org  http://www.helpguide.org/articles/add-adhd/attention-deficit-disorder-adhd-parenting-tips.htm   [facility]ids[facility]  http://kidshealth.org/parent/medical/learning/adhd.html   [employer] [place] [facility]  http://www.nimh.nih.gov/health/topics/attention-deficit-hyperactivity-disorder-adhd/index.shtml   Last Reviewed Date   2020-06-14  Consumer Information Use and Disclaimer   This information is not specific medical advice and does not replace information you receive from your health care provider. This is only a brief summary of general information. It does NOT include all information about conditions, illnesses,  injuries, tests, procedures, treatments, therapies, discharge instructions or life-style choices that may apply to you. You must talk with your health care provider for complete information about your health and treatment options. This information should not be used to decide whether or not to accept your health care providers advice, instructions or recommendations. Only your health care provider has the knowledge and training to provide advice that is right for you.  Copyright   Copyright © 2021 [employer], [employer]. and its affiliates and/or licensors. All rights reserved.  Patient Education       Yearly Physical for Adults   About this topic   Most people do not want to be sick. Having a checkup each year with your doctor is one way to help you stay healthy. You may need to see your doctor more or less often. How often you need to go to the doctor depends on your age. Your family and medical history also play a role in how often you need to go to the doctor. Going to see your doctor on a routine basis can help you find problems early or even before they start. This may make it easier to treat or cure your problem.  General   Your doctor will talk about many things during your checkup. Your doctor may ask about:  · Your medical and family history.  · All the drugs you are taking. Be sure to include all prescription, over the counter, and herbal supplements. Tell the doctor if you have any drug allergy. Bring a list of drugs you take with you.  · How you are feeling and if you are having any problems.  · Risky behaviors like smoking, drinking alcohol, using illegal drugs, not wearing seatbelts, having unprotected sex, etc.  Your doctor will do a physical exam and may check your:  · Height and weight  · Blood pressure  · Reflexes  · Memory  · Vision  · Hearing  Your doctor may order:  · Lab tests  · ECG to check your heart rhythm  · X-rays  · Tests or treatments based on your exam  What lifestyle changes are needed?    Your doctor may suggest you make changes to your lifestyle at this visit. The doctor may talk with you about being more active or lowering stress levels. Ask your doctor what you need to do.  What drugs may be needed?   Your doctor may order drugs or vaccines to protect you from illnesses.  What changes to diet are needed?   Talk to your doctor to see if any changes are needed to your diet.  When do I need to call the doctor?   Call your doctor if you need to learn about any test results. Together you can make a plan for more care.  Helpful tips   · Make a list of questions for your doctor before you go. This will help you remember to ask about any concerns. Write down any answers from your doctor so you can look over them after your visit.   · Tell your doctor about any changes in your body or health since your last visit.  · Ask your doctor about any screening tests you need.  Where can I learn more?   American Academy [employer]icians  http://familydoctor.org/familydoctor/en/prevention-wellness/staying-healthy/healthy-living/preventive-services-for-healthy-living.printerview.html   Centers for Disease Control  http://www.cdc.gov/family/checkup/   Last Reviewed Date   2019-04-22  Consumer Information Use and Disclaimer   This information is not specific medical advice and does not replace information you receive from your health care provider. This is only a brief summary of general information. It does NOT include all information about conditions, illnesses, injuries, tests, procedures, treatments, therapies, discharge instructions or life-style choices that may apply to you. You must talk with your health care provider for complete information about your health and treatment options. This information should not be used to decide whether or not to accept your health care providers advice, instructions or recommendations. Only your health care provider has the knowledge and training to provide advice that  is right for you.  Copyright   Copyright © 2021 UpToDate, Inc. and its affiliates and/or licensors. All rights reserved.        Risks, benefits, and alternatives discussed with patient, Patient verbalized understanding of discussed plan of care. Asked patient if any further questions, answered no.    Future Appointments   Date Time Provider Department Center   6/20/2022 11:40 AM Silviano Campbell NP [facility]              Silviano Campbell NP

## 2022-06-06 ENCOUNTER — PATIENT MESSAGE (OUTPATIENT)
Dept: PRIMARY CARE CLINIC | Facility: CLINIC | Age: 37
End: 2022-06-06
Payer: MEDICAID

## 2022-06-06 DIAGNOSIS — L08.9 SKIN INFECTION: Primary | ICD-10-CM

## 2022-06-07 ENCOUNTER — PATIENT MESSAGE (OUTPATIENT)
Dept: PRIMARY CARE CLINIC | Facility: CLINIC | Age: 37
End: 2022-06-07
Payer: MEDICAID

## 2022-06-07 DIAGNOSIS — L08.9 SKIN INFECTION: Primary | ICD-10-CM

## 2022-06-07 RX ORDER — AMOXICILLIN 500 MG/1
500 TABLET, FILM COATED ORAL EVERY 12 HOURS
Qty: 14 TABLET | Refills: 0 | Status: SHIPPED | OUTPATIENT
Start: 2022-06-07 | End: 2022-06-14

## 2022-06-07 RX ORDER — SULFAMETHOXAZOLE AND TRIMETHOPRIM 400; 80 MG/1; MG/1
1 TABLET ORAL 2 TIMES DAILY
Qty: 14 TABLET | Refills: 0 | Status: SHIPPED | OUTPATIENT
Start: 2022-06-07 | End: 2022-06-07 | Stop reason: ALTCHOICE

## 2022-06-17 LAB
ALBUMIN SERPL-MCNC: 4.5 G/DL (ref 3.6–5.1)
ALBUMIN/GLOB SERPL: 1.7 (CALC) (ref 1–2.5)
ALP SERPL-CCNC: 136 U/L (ref 31–125)
ALT SERPL-CCNC: 12 U/L (ref 6–29)
AST SERPL-CCNC: 13 U/L (ref 10–30)
BASOPHILS # BLD AUTO: 19 CELLS/UL (ref 0–200)
BASOPHILS NFR BLD AUTO: 0.4 %
BILIRUB SERPL-MCNC: 0.7 MG/DL (ref 0.2–1.2)
BUN SERPL-MCNC: 14 MG/DL (ref 7–25)
BUN/CREAT SERPL: ABNORMAL (CALC) (ref 6–22)
CALCIUM SERPL-MCNC: 9.6 MG/DL (ref 8.6–10.2)
CHLORIDE SERPL-SCNC: 107 MMOL/L (ref 98–110)
CHOLEST SERPL-MCNC: 188 MG/DL
CHOLEST/HDLC SERPL: 2.9 (CALC)
CO2 SERPL-SCNC: 26 MMOL/L (ref 20–32)
CREAT SERPL-MCNC: 0.81 MG/DL (ref 0.5–1.1)
EOSINOPHIL # BLD AUTO: 178 CELLS/UL (ref 15–500)
EOSINOPHIL NFR BLD AUTO: 3.7 %
ERYTHROCYTE [DISTWIDTH] IN BLOOD BY AUTOMATED COUNT: 14.1 % (ref 11–15)
GLOBULIN SER CALC-MCNC: 2.7 G/DL (CALC) (ref 1.9–3.7)
GLUCOSE SERPL-MCNC: 87 MG/DL (ref 65–99)
HCT VFR BLD AUTO: 41.3 % (ref 35–45)
HDLC SERPL-MCNC: 65 MG/DL
HGB BLD-MCNC: 13.8 G/DL (ref 11.7–15.5)
LDLC SERPL CALC-MCNC: 109 MG/DL (CALC)
LYMPHOCYTES # BLD AUTO: 1536 CELLS/UL (ref 850–3900)
LYMPHOCYTES NFR BLD AUTO: 32 %
MCH RBC QN AUTO: 28.8 PG (ref 27–33)
MCHC RBC AUTO-ENTMCNC: 33.4 G/DL (ref 32–36)
MCV RBC AUTO: 86.2 FL (ref 80–100)
MONOCYTES # BLD AUTO: 288 CELLS/UL (ref 200–950)
MONOCYTES NFR BLD AUTO: 6 %
NEUTROPHILS # BLD AUTO: 2779 CELLS/UL (ref 1500–7800)
NEUTROPHILS NFR BLD AUTO: 57.9 %
NONHDLC SERPL-MCNC: 123 MG/DL (CALC)
PLATELET # BLD AUTO: 333 THOUSAND/UL (ref 140–400)
PMV BLD REES-ECKER: 9.3 FL (ref 7.5–12.5)
POTASSIUM SERPL-SCNC: 4.1 MMOL/L (ref 3.5–5.3)
PROT SERPL-MCNC: 7.2 G/DL (ref 6.1–8.1)
RBC # BLD AUTO: 4.79 MILLION/UL (ref 3.8–5.1)
SODIUM SERPL-SCNC: 140 MMOL/L (ref 135–146)
T4 FREE SERPL-MCNC: 1 NG/DL (ref 0.8–1.8)
TRIGL SERPL-MCNC: 55 MG/DL
TSH SERPL-ACNC: 6.3 MIU/L
WBC # BLD AUTO: 4.8 THOUSAND/UL (ref 3.8–10.8)

## 2022-06-27 ENCOUNTER — OFFICE VISIT (OUTPATIENT)
Dept: PRIMARY CARE CLINIC | Facility: CLINIC | Age: 37
End: 2022-06-27
Payer: MEDICAID

## 2022-06-27 VITALS
HEART RATE: 86 BPM | OXYGEN SATURATION: 97 % | BODY MASS INDEX: 25.78 KG/M2 | SYSTOLIC BLOOD PRESSURE: 119 MMHG | DIASTOLIC BLOOD PRESSURE: 82 MMHG | HEIGHT: 64 IN | WEIGHT: 151 LBS

## 2022-06-27 DIAGNOSIS — E03.8 OTHER SPECIFIED HYPOTHYROIDISM: ICD-10-CM

## 2022-06-27 PROCEDURE — 1160F RVW MEDS BY RX/DR IN RCRD: CPT | Mod: CPTII,S$GLB,, | Performed by: NURSE PRACTITIONER

## 2022-06-27 PROCEDURE — 3079F PR MOST RECENT DIASTOLIC BLOOD PRESSURE 80-89 MM HG: ICD-10-PCS | Mod: CPTII,S$GLB,, | Performed by: NURSE PRACTITIONER

## 2022-06-27 PROCEDURE — 3079F DIAST BP 80-89 MM HG: CPT | Mod: CPTII,S$GLB,, | Performed by: NURSE PRACTITIONER

## 2022-06-27 PROCEDURE — 3074F PR MOST RECENT SYSTOLIC BLOOD PRESSURE < 130 MM HG: ICD-10-PCS | Mod: CPTII,S$GLB,, | Performed by: NURSE PRACTITIONER

## 2022-06-27 PROCEDURE — 1159F MED LIST DOCD IN RCRD: CPT | Mod: CPTII,S$GLB,, | Performed by: NURSE PRACTITIONER

## 2022-06-27 PROCEDURE — 3008F PR BODY MASS INDEX (BMI) DOCUMENTED: ICD-10-PCS | Mod: CPTII,S$GLB,, | Performed by: NURSE PRACTITIONER

## 2022-06-27 PROCEDURE — 3008F BODY MASS INDEX DOCD: CPT | Mod: CPTII,S$GLB,, | Performed by: NURSE PRACTITIONER

## 2022-06-27 PROCEDURE — 1160F PR REVIEW ALL MEDS BY PRESCRIBER/CLIN PHARMACIST DOCUMENTED: ICD-10-PCS | Mod: CPTII,S$GLB,, | Performed by: NURSE PRACTITIONER

## 2022-06-27 PROCEDURE — 3074F SYST BP LT 130 MM HG: CPT | Mod: CPTII,S$GLB,, | Performed by: NURSE PRACTITIONER

## 2022-06-27 PROCEDURE — 99214 OFFICE O/P EST MOD 30 MIN: CPT | Mod: S$GLB,,, | Performed by: NURSE PRACTITIONER

## 2022-06-27 PROCEDURE — 99214 PR OFFICE/OUTPT VISIT, EST, LEVL IV, 30-39 MIN: ICD-10-PCS | Mod: S$GLB,,, | Performed by: NURSE PRACTITIONER

## 2022-06-27 PROCEDURE — 1159F PR MEDICATION LIST DOCUMENTED IN MEDICAL RECORD: ICD-10-PCS | Mod: CPTII,S$GLB,, | Performed by: NURSE PRACTITIONER

## 2022-06-27 RX ORDER — LEVOTHYROXINE SODIUM 25 UG/1
25 TABLET ORAL
Qty: 30 TABLET | Refills: 1 | Status: SHIPPED | OUTPATIENT
Start: 2022-06-27 | End: 2022-08-29

## 2022-06-27 NOTE — PATIENT INSTRUCTIONS
RTC in 2 months for F/U or sooner if needed.    Keep appts with specialists as scheduled.    Instructed patient to report to nearest ER or call 911 if begins to have difficulty breathing, turning blue, chest pain, B/P < 80/60 or >170/100, palpitations, syncope, extreme weakness, or severe H/A. Patient verbalized understanding.      Patient Education       Hypothyroidism (Underactive Thyroid)   The Basics   Written by the doctors and editors at [facility]   What is hypothyroidism? -- Hypothyroidism is a condition that makes you feel tired.  There is a gland in your neck called the thyroid gland. It makes thyroid hormone. This hormone controls how the body uses and stores energy (figure 1).  Hypothyroidism is the medical term for when a person does not make enough thyroid hormone. People sometimes confuse this condition with HYPERthyroidism, which is when a person makes too much thyroid hormone.  What are the symptoms of hypothyroidism? -- Some people with hypothyroidism have no symptoms. But most people feel tired. That can make the condition hard to diagnose, because a lot of conditions can make you tired.  Other symptoms of hypothyroidism include:  Lack of energy  Getting cold easily  Developing coarse or thin hair  Getting constipated (having too few bowel movements)  If it is not treated, hypothyroidism can also weaken and slow your heart. This can make you feel out of breath or tired when you exercise and cause swelling (fluid buildup) in your ankles. Untreated hypothyroidism can also increase your blood pressure and raise your cholesterol - both of which increase the risk of heart trouble.  In women, hypothyroidism can disrupt monthly periods. It can also make it hard to get pregnant. In women who do get pregnant, hypothyroidism can cause problems. For instance, it can increase the chances of having a miscarriage. (A miscarriage is when a pregnancy ends on its own before the woman has been pregnant for 20  weeks.)  Is there a test for hypothyroidism? -- Yes. Your doctor or nurse can test you for hypothyroidism using a simple blood test.  How is hypothyroidism treated? -- Treatment for hypothyroidism involves taking thyroid hormone pills every day. After you take the pills for about 6 weeks, your doctor or nurse will test your blood to make sure the levels are where they should be. They might adjust your dose depending on the results. Most people with hypothyroidism need to be on thyroid pills for the rest of their life.  Thyroid hormone pills come in different brand name and generic forms. All the pills work equally well. But you should not switch from one generic or brand name to another. Switching between pills can cause your levels to go up and down.  Never change your dose of thyroid hormone on your own. Taking too much thyroid hormone can cause heart rhythm problems and even damage your bones.  What if I want to get pregnant? -- You can try to get pregnant. Many women with hypothyroidism have healthy pregnancies. But your doctor or nurse will most likely need to change your dose of thyroid hormone once you are pregnant. That's because you need more thyroid hormone during pregnancy. They will also measure your levels of thyroid hormone 4 weeks after any change in your dose, and at least once during each trimester of pregnancy.  All topics are updated as new evidence becomes available and our peer review process is complete.  This topic retrieved from [employer] on: Sep 21, 2021.  Topic 32874 Version 8.0  Release: 29.4.2 - C29.263  © 2021 UpToDate, Inc. and/or its affiliates. All rights reserved.  figure 1: Thyroid and parathyroid glands     The thyroid is a butterfly-shaped gland in the middle of the neck. It sits just below the larynx (voice box). The thyroid makes two hormones, called T3 and T4, which control how the body uses and stores energy. The parathyroid glands are four small glands behind the thyroid. They  make a hormone called parathyroid hormone, which helps control the amount of calcium in the blood.  Graphic 69380 Version 9.0    Consumer Information Use and Disclaimer   This information is not specific medical advice and does not replace information you receive from your health care provider. This is only a brief summary of general information. It does NOT include all information about conditions, illnesses, injuries, tests, procedures, treatments, therapies, discharge instructions or life-style choices that may apply to you. You must talk with your health care provider for complete information about your health and treatment options. This information should not be used to decide whether or not to accept your health care provider's advice, instructions or recommendations. Only your health care provider has the knowledge and training to provide advice that is right for you. The use of this information is governed by the [employer] End User License Agreement, available at https://www.ironSource.[employer]/en/solutions/NurseBuddy/about/justen.The use of Videum content is governed by the Videum Terms of Use. ©2021 UpToDate, Inc. All rights reserved.  Copyright   © 2021 UpToDate, Inc. and/or its affiliates. All rights reserved.

## 2022-06-27 NOTE — PROGRESS NOTES
Subjective:       Patient ID: Sari Fernandez is a 36 y.o. female.    Chief Complaint: Follow-up    HPI: Sari is a 36 y.o. female who presents for F/U and to discuss lab results.    TSH elevated at 6.30 but T4 Free is normal. She has also been fatigued and has difficulty losing weight. Her mom also has thyroid disease. All other labs are normal.     She did have a baby boy 11 months ago delivered by Dr. Larios. She is currently breastfeeding him.    History of ADHD and Anxiety and was taking Adderall since a teen. She has been prescibed Celexa for her anxiety but she has not taken the medication since she is breastfeeding. Denies being depressed.    Only takes prenatal vitamins daily.    Denies drinking or smoking.    Refuses FLU and COVID vaccines.                    Past Medical History:   Diagnosis Date    ADHD     ADHD     Anemia     Anxiety     Anxiety     Urinary tract infection        Past Surgical History:   Procedure Laterality Date     SECTION, LOW TRANSVERSE      KIDNEY STONE SURGERY      LITHOTRIPSY         Family History   Problem Relation Age of Onset    Cancer Paternal Grandmother     Cancer Father        Social History     Tobacco Use    Smoking status: Never Smoker    Smokeless tobacco: Never Used   Substance Use Topics    Alcohol use: Not Currently    Drug use: Not Currently       Patient Active Problem List   Diagnosis    Adult attention deficit hyperactivity disorder    Anxiety disorder    Attention deficit hyperactivity disorder, predominantly inattentive type    Bacteremia    Fatigue    Hypertension affecting pregnancy    Hypokalemia    Iron deficiency anemia    Pregnancy    Screening for malignant neoplasm of cervix    Status post  delivery    Tobacco user    Urinary tract infection in mother during pregnancy    Status post  section    UTI (urinary tract infection) during pregnancy       Immunization History   Administered Date(s)  "Administered    DTaP 1987, 04/08/1988, 06/23/1988, 04/18/1989, 08/06/1992    HIB 06/23/1999    Hepatitis B, Pediatric/Adolescent 07/09/1998, 08/12/1998, 04/21/1999    IPV 1987, 04/08/1988, 06/23/1988, 04/18/1989, 08/06/1992    MMR 11/14/1988, 08/06/1992    Tdap 07/09/1998, 11/12/2008, 05/31/2021, 06/28/2021           Review of Systems   Constitutional: Positive for fatigue and unexpected weight change (weight gain). Negative for activity change, appetite change, chills, diaphoresis and fever.   HENT: Negative for tinnitus and trouble swallowing.    Eyes: Negative for visual disturbance.   Respiratory: Negative for cough, chest tightness, shortness of breath and wheezing.    Cardiovascular: Negative for chest pain, palpitations and leg swelling.   Genitourinary: Negative for decreased urine volume, dysuria, frequency, hematuria and urgency.   Musculoskeletal: Negative for gait problem.   Skin: Negative for color change and rash.   Neurological: Negative for dizziness, syncope, weakness, light-headedness, numbness and headaches.   Psychiatric/Behavioral: Negative for behavioral problems, confusion, dysphoric mood, self-injury and suicidal ideas. The patient is nervous/anxious.      Objective:     Vitals:    06/27/22 1106   BP: 119/82   BP Location: Left arm   Patient Position: Sitting   BP Method: Medium (Automatic)   Pulse: 86   SpO2: 97%   Weight: 68.5 kg (151 lb)   Height: 5' 4" (1.626 m)       Physical Exam  Vitals and nursing note reviewed.   Constitutional:       Appearance: Normal appearance.   HENT:      Head: Normocephalic and atraumatic.      Mouth/Throat:      Mouth: Mucous membranes are moist.      Pharynx: Oropharynx is clear.   Eyes:      Extraocular Movements: Extraocular movements intact.      Conjunctiva/sclera: Conjunctivae normal.      Pupils: Pupils are equal, round, and reactive to light.   Cardiovascular:      Rate and Rhythm: Normal rate and regular rhythm.   Pulmonary:      " Effort: Pulmonary effort is normal.      Breath sounds: Normal breath sounds.   Abdominal:      General: There is no distension.      Tenderness: There is no abdominal tenderness.   Musculoskeletal:         General: Normal range of motion.      Cervical back: Normal range of motion and neck supple.   Skin:     General: Skin is warm and dry.   Neurological:      Mental Status: She is alert and oriented to person, place, and time.   Psychiatric:         Mood and Affect: Affect normal. Mood is anxious.         Behavior: Behavior normal. Behavior is cooperative.         Thought Content: Thought content normal. Thought content does not include homicidal or suicidal ideation. Thought content does not include homicidal or suicidal plan.         Office Visit on 05/09/2022   Component Date Value Ref Range Status    Cholesterol 06/16/2022 188  <200 mg/dL Final    HDL 06/16/2022 65  > OR = 50 mg/dL Final    Triglycerides 06/16/2022 55  <150 mg/dL Final    LDL Cholesterol 06/16/2022 109 (A) mg/dL (calc) Final    HDL/Cholesterol Ratio 06/16/2022 2.9  <5.0 (calc) Final    Non HDL Chol. (LDL+VLDL) 06/16/2022 123  <130 mg/dL (calc) Final    WBC 06/16/2022 4.8  3.8 - 10.8 Thousand/uL Final    RBC 06/16/2022 4.79  3.80 - 5.10 Million/uL Final    Hemoglobin 06/16/2022 13.8  11.7 - 15.5 g/dL Final    Hematocrit 06/16/2022 41.3  35.0 - 45.0 % Final    MCV 06/16/2022 86.2  80.0 - 100.0 fL Final    MCH 06/16/2022 28.8  27.0 - 33.0 pg Final    MCHC 06/16/2022 33.4  32.0 - 36.0 g/dL Final    RDW 06/16/2022 14.1  11.0 - 15.0 % Final    Platelets 06/16/2022 333  140 - 400 Thousand/uL Final    MPV 06/16/2022 9.3  7.5 - 12.5 fL Final    Neutrophils, Abs 06/16/2022 2,779  1,500 - 7,800 cells/uL Final    Lymph # 06/16/2022 1,536  850 - 3,900 cells/uL Final    Mono # 06/16/2022 288  200 - 950 cells/uL Final    Eos # 06/16/2022 178  15 - 500 cells/uL Final    Baso # 06/16/2022 19  0 - 200 cells/uL Final    Neutrophils Relative  06/16/2022 57.9  % Final    Lymph % 06/16/2022 32.0  % Final    Mono % 06/16/2022 6.0  % Final    Eosinophil % 06/16/2022 3.7  % Final    Basophil % 06/16/2022 0.4  % Final    Glucose 06/16/2022 87  65 - 99 mg/dL Final    BUN 06/16/2022 14  7 - 25 mg/dL Final    Creatinine 06/16/2022 0.81  0.50 - 1.10 mg/dL Final    eGFR if non African American 06/16/2022 95  > OR = 60 mL/min/1.73m2 Final    eGFR if African American 06/16/2022 110  > OR = 60 mL/min/1.73m2 Final    BUN/Creatinine Ratio 06/16/2022 NOT APPLICABLE  6 - 22 (calc) Final    Sodium 06/16/2022 140  135 - 146 mmol/L Final    Potassium 06/16/2022 4.1  3.5 - 5.3 mmol/L Final    Chloride 06/16/2022 107  98 - 110 mmol/L Final    CO2 06/16/2022 26  20 - 32 mmol/L Final    Calcium 06/16/2022 9.6  8.6 - 10.2 mg/dL Final    Total Protein 06/16/2022 7.2  6.1 - 8.1 g/dL Final    Albumin 06/16/2022 4.5  3.6 - 5.1 g/dL Final    Globulin, Total 06/16/2022 2.7  1.9 - 3.7 g/dL (calc) Final    Albumin/Globulin Ratio 06/16/2022 1.7  1.0 - 2.5 (calc) Final    Total Bilirubin 06/16/2022 0.7  0.2 - 1.2 mg/dL Final    Alkaline Phosphatase 06/16/2022 136 (A) 31 - 125 U/L Final    AST 06/16/2022 13  10 - 30 U/L Final    ALT 06/16/2022 12  6 - 29 U/L Final    TSH w/reflex to FT4 06/16/2022 6.30 (A) mIU/L Final    T4, Free 06/16/2022 1.0  0.8 - 1.8 ng/dL Final   Office Visit on 02/04/2022   Component Date Value Ref Range Status    Specimen Source 02/04/2022 KIDNEY STONE   Final    Component 1 02/04/2022    Final    Stone Weight 02/04/2022 0.010  g Final   Clinical Support on 01/24/2022   Component Date Value Ref Range Status    SARS Coronavirus 2 Antigen 01/24/2022 Negative  Negative Final     Acceptable 01/24/2022 Yes   Final   Orders Only on 01/11/2022   Component Date Value Ref Range Status    Sodium 01/11/2022 142  135 - 145 mmol/L Final    Potassium 01/11/2022 4.1  3.6 - 5.2 mmol/L Final    Chloride 01/11/2022 107  100 - 108 mmol/L  Final    CO2 01/11/2022 25  21 - 32 mmol/L Final    Anion Gap 01/11/2022 10.0  3.0 - 11.0 mmol/L Final    BUN 01/11/2022 16  7 - 18 mg/dL Final    Creatinine 01/11/2022 0.81  0.55 - 1.02 mg/dL Final    GFR ESTIMATION 01/11/2022 > 60  >60 Final    BUN/Creatinine Ratio 01/11/2022 19.75 (A) 7 - 18 Ratio Final    Glucose 01/11/2022 99  70 - 110 mg/dL Final    Calcium 01/11/2022 9.2  8.8 - 10.5 mg/dL Final   Orders Only on 01/11/2022   Component Date Value Ref Range Status    Specimen Collection Method 01/11/2022 Cl Catch Mid Stream   Final    Color, UA 01/11/2022 Pale Yellow  Yellow Final    Appearance, UA 01/11/2022 CLEAR  Clear Final    pH, UA 01/11/2022 6.5  5.0 - 9.0 pH Final    Specific Gravity, UA 01/11/2022 1.015  1.000 - 1.030 Final    Protein, UA 01/11/2022 Negative  Negative mg/dL Final    Glucose, UA 01/11/2022 Normal  Normal mg/dL Final    Ketones, UA 01/11/2022 Negative  Negative mg/dL Final    Occult Blood UA 01/11/2022 Negative  Negative /uL Final    Nitrite, UA 01/11/2022 Negative  Negative Final    Bilirubin (UA) 01/11/2022 Negative  Negative mg/dL Final    Urobilinogen, UA 01/11/2022 Normal  Normal mg/dL Final    Leukocytes, UA 01/11/2022 Negative  Negative /uL Final    Preg Test, Ur 01/11/2022 Negative  Negative Final   Orders Only on 01/11/2022   Component Date Value Ref Range Status    WBC 01/11/2022 4.2 (A) 4.6 - 10.2 10*3/uL Final    RBC 01/11/2022 4.74  4.2 - 5.4 10*6/uL Final    Hemoglobin 01/11/2022 12.9  12.0 - 16.0 g/dL Final    Hematocrit 01/11/2022 38.9  37.0 - 47.0 % Final    MCV 01/11/2022 82.1  80 - 97 fL Final    MCH 01/11/2022 27.2  27.0 - 31.2 pg Final    MCHC 01/11/2022 33.2  31.8 - 35.4 g/dL Final    RDW RBC Auto-Rto 01/11/2022 14.4  12.5 - 18.0 % Final    Platelets 01/11/2022 325  142 - 424 10*3/uL Final    Neutrophils 01/11/2022 57.2  37 - 80 % Final    Lymphocytes 01/11/2022 29.8  25 - 40 % Final    Monocytes 01/11/2022 6.9  1 - 15 % Final     Eosinophils 01/11/2022 5.4 (A) 1 - 3 % Final    Basophils 01/11/2022 0.5  0 - 3 % Final    nRBC# 01/11/2022 0.0  % Final    Neutrophils Absolute 01/11/2022 2.42  1.8 - 7.7 10*3/uL Final     Reviewed and discussed lab results in depth with full understanding.     Assessment:      1. Other specified hypothyroidism          Plan:     Other specified hypothyroidism  Comments:  Start levothyroxine 25 mcg daily, will recheck thyroid panel in 2 months  Orders:  -     levothyroxine (SYNTHROID) 25 MCG tablet; Take 1 tablet (25 mcg total) by mouth before breakfast.  Dispense: 30 tablet; Refill: 1  -     TSH w/reflex to FT4; Future; Expected date: 07/27/2022         Current Outpatient Medications   Medication Sig Dispense Refill    levothyroxine (SYNTHROID) 25 MCG tablet Take 1 tablet (25 mcg total) by mouth before breakfast. 30 tablet 1     No current facility-administered medications for this visit.       Medications Discontinued During This Encounter   Medication Reason    hyoscyamine (ANASPAZ,LEVSIN) 0.125 mg Tab Patient no longer taking       Health Maintenance   Topic Date Due    TETANUS VACCINE  06/28/2031    Hepatitis C Screening  Completed    Lipid Panel  Completed       Patient Instructions   RTC in 2 months for F/U or sooner if needed.    Keep appts with specialists as scheduled.    Instructed patient to report to nearest ER or call 911 if begins to have difficulty breathing, turning blue, chest pain, B/P < 80/60 or >170/100, palpitations, syncope, extreme weakness, or severe H/A. Patient verbalized understanding.      Patient Education       Hypothyroidism (Underactive Thyroid)   The Basics   Written by the doctors and editors at [facility]   What is hypothyroidism? -- Hypothyroidism is a condition that makes you feel tired.  There is a gland in your neck called the thyroid gland. It makes thyroid hormone. This hormone controls how the body uses and stores energy (figure 1).  Hypothyroidism is the medical  term for when a person does not make enough thyroid hormone. People sometimes confuse this condition with HYPERthyroidism, which is when a person makes too much thyroid hormone.  What are the symptoms of hypothyroidism? -- Some people with hypothyroidism have no symptoms. But most people feel tired. That can make the condition hard to diagnose, because a lot of conditions can make you tired.  Other symptoms of hypothyroidism include:  · Lack of energy  · Getting cold easily  · Developing coarse or thin hair  · Getting constipated (having too few bowel movements)  If it is not treated, hypothyroidism can also weaken and slow your heart. This can make you feel out of breath or tired when you exercise and cause swelling (fluid buildup) in your ankles. Untreated hypothyroidism can also increase your blood pressure and raise your cholesterol - both of which increase the risk of heart trouble.  In women, hypothyroidism can disrupt monthly periods. It can also make it hard to get pregnant. In women who do get pregnant, hypothyroidism can cause problems. For instance, it can increase the chances of having a miscarriage. (A miscarriage is when a pregnancy ends on its own before the woman has been pregnant for 20 weeks.)  Is there a test for hypothyroidism? -- Yes. Your doctor or nurse can test you for hypothyroidism using a simple blood test.  How is hypothyroidism treated? -- Treatment for hypothyroidism involves taking thyroid hormone pills every day. After you take the pills for about 6 weeks, your doctor or nurse will test your blood to make sure the levels are where they should be. They might adjust your dose depending on the results. Most people with hypothyroidism need to be on thyroid pills for the rest of their life.  Thyroid hormone pills come in different brand name and generic forms. All the pills work equally well. But you should not switch from one generic or brand name to another. Switching between pills can  cause your levels to go up and down.  Never change your dose of thyroid hormone on your own. Taking too much thyroid hormone can cause heart rhythm problems and even damage your bones.  What if I want to get pregnant? -- You can try to get pregnant. Many women with hypothyroidism have healthy pregnancies. But your doctor or nurse will most likely need to change your dose of thyroid hormone once you are pregnant. That's because you need more thyroid hormone during pregnancy. They will also measure your levels of thyroid hormone 4 weeks after any change in your dose, and at least once during each trimester of pregnancy.  All topics are updated as new evidence becomes available and our peer review process is complete.  This topic retrieved from [employer] on: Sep 21, 2021.  Topic 16636 Version 8.0  Release: 29.4.2 - C29.263  © 2021 UpToDate, Inc. and/or its affiliates. All rights reserved.  figure 1: Thyroid and parathyroid glands     The thyroid is a butterfly-shaped gland in the middle of the neck. It sits just below the larynx (voice box). The thyroid makes two hormones, called T3 and T4, which control how the body uses and stores energy. The parathyroid glands are four small glands behind the thyroid. They make a hormone called parathyroid hormone, which helps control the amount of calcium in the blood.  Graphic 42372 Version 9.0    Consumer Information Use and Disclaimer   This information is not specific medical advice and does not replace information you receive from your health care provider. This is only a brief summary of general information. It does NOT include all information about conditions, illnesses, injuries, tests, procedures, treatments, therapies, discharge instructions or life-style choices that may apply to you. You must talk with your health care provider for complete information about your health and treatment options. This information should not be used to decide whether or not to accept your  health care provider's advice, instructions or recommendations. Only your health care provider has the knowledge and training to provide advice that is right for you. The use of this information is governed by the [employer] End User License Agreement, available at https://www.[employer]/en/solutions/Viki/about/justen.The use of OpenCounter content is governed by the [employer] Terms of Use. ©2021 UpToDate, Inc. All rights reserved.  Copyright   © 2021 UpToDate, Inc. and/or its affiliates. All rights reserved.      Risks, benefits, and alternatives discussed with patient, Patient verbalized understanding of discussed plan of care. Asked patient if any further questions, answered no.    No future appointments.           Silviano Campbell NP

## 2022-07-07 ENCOUNTER — PATIENT MESSAGE (OUTPATIENT)
Dept: PRIMARY CARE CLINIC | Facility: CLINIC | Age: 37
End: 2022-07-07
Payer: MEDICAID

## 2022-07-07 ENCOUNTER — PATIENT MESSAGE (OUTPATIENT)
Dept: OBSTETRICS AND GYNECOLOGY | Facility: CLINIC | Age: 37
End: 2022-07-07
Payer: MEDICAID

## 2022-08-04 ENCOUNTER — PATIENT MESSAGE (OUTPATIENT)
Dept: PRIMARY CARE CLINIC | Facility: CLINIC | Age: 37
End: 2022-08-04
Payer: MEDICAID

## 2022-08-23 ENCOUNTER — PATIENT MESSAGE (OUTPATIENT)
Dept: PRIMARY CARE CLINIC | Facility: CLINIC | Age: 37
End: 2022-08-23
Payer: MEDICAID

## 2022-08-23 DIAGNOSIS — N61.0 ACUTE MASTITIS: Primary | ICD-10-CM

## 2022-08-23 RX ORDER — CEPHALEXIN 500 MG/1
500 CAPSULE ORAL EVERY 6 HOURS
Qty: 40 CAPSULE | Refills: 0 | Status: SHIPPED | OUTPATIENT
Start: 2022-08-23 | End: 2022-08-29 | Stop reason: SDUPTHER

## 2022-08-25 LAB — TSH SERPL-ACNC: 3.56 MIU/L

## 2022-08-29 ENCOUNTER — OFFICE VISIT (OUTPATIENT)
Dept: PRIMARY CARE CLINIC | Facility: CLINIC | Age: 37
End: 2022-08-29
Payer: MEDICAID

## 2022-08-29 VITALS
SYSTOLIC BLOOD PRESSURE: 113 MMHG | HEART RATE: 64 BPM | HEIGHT: 64 IN | WEIGHT: 151 LBS | BODY MASS INDEX: 25.78 KG/M2 | OXYGEN SATURATION: 99 % | DIASTOLIC BLOOD PRESSURE: 75 MMHG | RESPIRATION RATE: 18 BRPM

## 2022-08-29 DIAGNOSIS — N61.0 ACUTE MASTITIS: ICD-10-CM

## 2022-08-29 DIAGNOSIS — E03.8 OTHER SPECIFIED HYPOTHYROIDISM: ICD-10-CM

## 2022-08-29 PROCEDURE — 1159F MED LIST DOCD IN RCRD: CPT | Mod: CPTII,S$GLB,, | Performed by: NURSE PRACTITIONER

## 2022-08-29 PROCEDURE — 99214 PR OFFICE/OUTPT VISIT, EST, LEVL IV, 30-39 MIN: ICD-10-PCS | Mod: S$GLB,,, | Performed by: NURSE PRACTITIONER

## 2022-08-29 PROCEDURE — 3078F DIAST BP <80 MM HG: CPT | Mod: CPTII,S$GLB,, | Performed by: NURSE PRACTITIONER

## 2022-08-29 PROCEDURE — 3008F BODY MASS INDEX DOCD: CPT | Mod: CPTII,S$GLB,, | Performed by: NURSE PRACTITIONER

## 2022-08-29 PROCEDURE — 3078F PR MOST RECENT DIASTOLIC BLOOD PRESSURE < 80 MM HG: ICD-10-PCS | Mod: CPTII,S$GLB,, | Performed by: NURSE PRACTITIONER

## 2022-08-29 PROCEDURE — 3074F SYST BP LT 130 MM HG: CPT | Mod: CPTII,S$GLB,, | Performed by: NURSE PRACTITIONER

## 2022-08-29 PROCEDURE — 1159F PR MEDICATION LIST DOCUMENTED IN MEDICAL RECORD: ICD-10-PCS | Mod: CPTII,S$GLB,, | Performed by: NURSE PRACTITIONER

## 2022-08-29 PROCEDURE — 3074F PR MOST RECENT SYSTOLIC BLOOD PRESSURE < 130 MM HG: ICD-10-PCS | Mod: CPTII,S$GLB,, | Performed by: NURSE PRACTITIONER

## 2022-08-29 PROCEDURE — 1160F PR REVIEW ALL MEDS BY PRESCRIBER/CLIN PHARMACIST DOCUMENTED: ICD-10-PCS | Mod: CPTII,S$GLB,, | Performed by: NURSE PRACTITIONER

## 2022-08-29 PROCEDURE — 3008F PR BODY MASS INDEX (BMI) DOCUMENTED: ICD-10-PCS | Mod: CPTII,S$GLB,, | Performed by: NURSE PRACTITIONER

## 2022-08-29 PROCEDURE — 1160F RVW MEDS BY RX/DR IN RCRD: CPT | Mod: CPTII,S$GLB,, | Performed by: NURSE PRACTITIONER

## 2022-08-29 PROCEDURE — 99214 OFFICE O/P EST MOD 30 MIN: CPT | Mod: S$GLB,,, | Performed by: NURSE PRACTITIONER

## 2022-08-29 RX ORDER — CEPHALEXIN 500 MG/1
500 CAPSULE ORAL EVERY 6 HOURS
Qty: 40 CAPSULE | Refills: 0 | Status: SHIPPED | OUTPATIENT
Start: 2022-08-29 | End: 2022-09-08

## 2022-08-29 NOTE — PROGRESS NOTES
Subjective:       Patient ID: Sari Fernandez is a 37 y.o. female.    Chief Complaint: Follow-up    HPI: Sari is a 37 y.o. female who presents for F/U.    TSH now down to 3.56 from 6.30 and T4 Free is normal. No longer feeling fatigued and denies issues with weight, hair or skin. Her mom also has thyroid disease. All other labs are normal. She was prescribed levothyroxine 25 mcg but did not take the medication to see if her level would come down on its own and it did.     She did have a baby boy 11 months ago delivered by Dr. Larios. She is still breastfeeding him.    History of ADHD and Anxiety and was taking Adderall since a teen. She has been prescibed Celexa for her anxiety but she has not taken the medication since she is breastfeeding. Denies being depressed. Plan to start back on Adderall after she is done breastfeeding.    Denies drinking or smoking.    Refuses FLU and COVID vaccines.                    Past Medical History:   Diagnosis Date    ADHD     ADHD     Anemia     Anxiety     Anxiety     Urinary tract infection        Past Surgical History:   Procedure Laterality Date     SECTION, LOW TRANSVERSE      KIDNEY STONE SURGERY      LITHOTRIPSY         Family History   Problem Relation Age of Onset    Cancer Paternal Grandmother     Cancer Father        Social History     Tobacco Use    Smoking status: Never    Smokeless tobacco: Never   Substance Use Topics    Alcohol use: Not Currently    Drug use: Not Currently       Patient Active Problem List   Diagnosis    Adult attention deficit hyperactivity disorder    Anxiety disorder    Attention deficit hyperactivity disorder, predominantly inattentive type    Bacteremia    Fatigue    Hypertension affecting pregnancy    Hypokalemia    Iron deficiency anemia    Pregnancy    Screening for malignant neoplasm of cervix    Status post  delivery    Tobacco user    Urinary tract infection in mother during pregnancy    Status post  section  "   UTI (urinary tract infection) during pregnancy    Other specified hypothyroidism       Immunization History   Administered Date(s) Administered    DTaP 1987, 04/08/1988, 06/23/1988, 04/18/1989, 08/06/1992    HIB 06/23/1999    Hepatitis B, Pediatric/Adolescent 07/09/1998, 08/12/1998, 04/21/1999    IPV 1987, 04/08/1988, 06/23/1988, 04/18/1989, 08/06/1992    MMR 11/14/1988, 08/06/1992    Tdap 07/09/1998, 11/12/2008, 05/31/2021, 06/28/2021           Review of Systems   Constitutional:  Negative for activity change, appetite change, chills, diaphoresis, fatigue and fever.   HENT:  Negative for tinnitus and trouble swallowing.    Eyes:  Negative for visual disturbance.   Respiratory:  Negative for cough, chest tightness, shortness of breath and wheezing.    Cardiovascular:  Negative for chest pain, palpitations and leg swelling.   Gastrointestinal:  Negative for abdominal distention and abdominal pain.   Genitourinary:  Negative for decreased urine volume, dysuria, frequency, hematuria and urgency.   Musculoskeletal:  Negative for gait problem, neck pain and neck stiffness.   Skin:  Negative for color change and rash.   Neurological:  Negative for dizziness, syncope, weakness, light-headedness, numbness and headaches.   Psychiatric/Behavioral:  Negative for behavioral problems, confusion and dysphoric mood. The patient is not nervous/anxious.    Objective:     Vitals:    08/29/22 0843   BP: 113/75   BP Location: Left arm   Patient Position: Sitting   BP Method: Large (Automatic)   Pulse: 64   Resp: 18   SpO2: 99%   Weight: 68.5 kg (151 lb)   Height: 5' 4" (1.626 m)       Physical Exam  Vitals and nursing note reviewed.   Constitutional:       Appearance: Normal appearance.   HENT:      Head: Normocephalic and atraumatic.      Mouth/Throat:      Mouth: Mucous membranes are moist.      Pharynx: Oropharynx is clear.   Eyes:      Conjunctiva/sclera: Conjunctivae normal.      Pupils: Pupils are equal, round, and " reactive to light.   Neck:      Thyroid: No thyromegaly or thyroid tenderness.   Cardiovascular:      Rate and Rhythm: Normal rate and regular rhythm.      Pulses: Normal pulses.      Heart sounds: Normal heart sounds.   Pulmonary:      Effort: Pulmonary effort is normal.      Breath sounds: Normal breath sounds. No stridor. No wheezing or rales.   Abdominal:      General: There is no distension.      Tenderness: There is no abdominal tenderness.   Musculoskeletal:         General: No tenderness. Normal range of motion.      Cervical back: Normal range of motion and neck supple.      Right lower leg: No edema.      Left lower leg: No edema.   Skin:     General: Skin is warm and dry.   Neurological:      Mental Status: She is alert and oriented to person, place, and time.   Psychiatric:         Mood and Affect: Mood and affect normal.         Behavior: Behavior normal. Behavior is cooperative.       Office Visit on 06/27/2022   Component Date Value Ref Range Status    TSH w/reflex to FT4 08/24/2022 3.56  mIU/L Final   Office Visit on 05/09/2022   Component Date Value Ref Range Status    Cholesterol 06/16/2022 188  <200 mg/dL Final    HDL 06/16/2022 65  > OR = 50 mg/dL Final    Triglycerides 06/16/2022 55  <150 mg/dL Final    LDL Cholesterol 06/16/2022 109 (H)  mg/dL (calc) Final    HDL/Cholesterol Ratio 06/16/2022 2.9  <5.0 (calc) Final    Non HDL Chol. (LDL+VLDL) 06/16/2022 123  <130 mg/dL (calc) Final    WBC 06/16/2022 4.8  3.8 - 10.8 Thousand/uL Final    RBC 06/16/2022 4.79  3.80 - 5.10 Million/uL Final    Hemoglobin 06/16/2022 13.8  11.7 - 15.5 g/dL Final    Hematocrit 06/16/2022 41.3  35.0 - 45.0 % Final    MCV 06/16/2022 86.2  80.0 - 100.0 fL Final    MCH 06/16/2022 28.8  27.0 - 33.0 pg Final    MCHC 06/16/2022 33.4  32.0 - 36.0 g/dL Final    RDW 06/16/2022 14.1  11.0 - 15.0 % Final    Platelets 06/16/2022 333  140 - 400 Thousand/uL Final    MPV 06/16/2022 9.3  7.5 - 12.5 fL Final    Neutrophils, Abs 06/16/2022  2,779  1,500 - 7,800 cells/uL Final    Lymph # 06/16/2022 1,536  850 - 3,900 cells/uL Final    Mono # 06/16/2022 288  200 - 950 cells/uL Final    Eos # 06/16/2022 178  15 - 500 cells/uL Final    Baso # 06/16/2022 19  0 - 200 cells/uL Final    Neutrophils Relative 06/16/2022 57.9  % Final    Lymph % 06/16/2022 32.0  % Final    Mono % 06/16/2022 6.0  % Final    Eosinophil % 06/16/2022 3.7  % Final    Basophil % 06/16/2022 0.4  % Final    Glucose 06/16/2022 87  65 - 99 mg/dL Final    BUN 06/16/2022 14  7 - 25 mg/dL Final    Creatinine 06/16/2022 0.81  0.50 - 1.10 mg/dL Final    eGFR if non African American 06/16/2022 95  > OR = 60 mL/min/1.73m2 Final    eGFR if African American 06/16/2022 110  > OR = 60 mL/min/1.73m2 Final    BUN/Creatinine Ratio 06/16/2022 NOT APPLICABLE  6 - 22 (calc) Final    Sodium 06/16/2022 140  135 - 146 mmol/L Final    Potassium 06/16/2022 4.1  3.5 - 5.3 mmol/L Final    Chloride 06/16/2022 107  98 - 110 mmol/L Final    CO2 06/16/2022 26  20 - 32 mmol/L Final    Calcium 06/16/2022 9.6  8.6 - 10.2 mg/dL Final    Total Protein 06/16/2022 7.2  6.1 - 8.1 g/dL Final    Albumin 06/16/2022 4.5  3.6 - 5.1 g/dL Final    Globulin, Total 06/16/2022 2.7  1.9 - 3.7 g/dL (calc) Final    Albumin/Globulin Ratio 06/16/2022 1.7  1.0 - 2.5 (calc) Final    Total Bilirubin 06/16/2022 0.7  0.2 - 1.2 mg/dL Final    Alkaline Phosphatase 06/16/2022 136 (H)  31 - 125 U/L Final    AST 06/16/2022 13  10 - 30 U/L Final    ALT 06/16/2022 12  6 - 29 U/L Final    TSH w/reflex to FT4 06/16/2022 6.30 (H)  mIU/L Final    T4, Free 06/16/2022 1.0  0.8 - 1.8 ng/dL Final         Assessment:      1. Other specified hypothyroidism    2. Acute mastitis          Plan:     Other specified hypothyroidism  Comments:  Will continue to watch for now due to patient being asymptomatic    Acute mastitis  -     cephALEXin (KEFLEX) 500 MG capsule; Take 1 capsule (500 mg total) by mouth every 6 (six) hours. for 10 days  Dispense: 40 capsule;  Refill: 0       Current Outpatient Medications   Medication Sig Dispense Refill    cephALEXin (KEFLEX) 500 MG capsule Take 1 capsule (500 mg total) by mouth every 6 (six) hours. for 10 days 40 capsule 0     No current facility-administered medications for this visit.       Medications Discontinued During This Encounter   Medication Reason    levothyroxine (SYNTHROID) 25 MCG tablet Therapy not effective    cephALEXin (KEFLEX) 500 MG capsule Reorder       Health Maintenance   Topic Date Due    TETANUS VACCINE  06/28/2031    Hepatitis C Screening  Completed    Lipid Panel  Completed       Patient Instructions   RTC in 3 months for F/U or sooner if needed.    Keep appts with specialists as scheduled.    Instructed patient to report to nearest ER or call 911 if begins to have difficulty breathing, turning blue, chest pain, B/P < 80/60 or >170/100, palpitations, syncope, extreme weakness, or severe H/A. Patient verbalized understanding.      Patient Education       Anxiety Discharge Instructions, Adult   About this topic   Anxiety can cause you to feel very worried. It can also cause physical symptoms like chest pain, stomach aches, or trouble sleeping. While mild anxiety is a normal response to stress, it can cause you problems in your everyday life. You may need follow-up care to help manage your anxiety. Anxiety happens in many forms, like:  Being scared all the time that something bad is going to happen. This is generalized anxiety.  Strong bursts of fear where your body has signs that may feel like a heart attack. This is called a panic attack.  Upsetting thoughts that happen often. There is a need to repeat doing certain things to help get rid of the anxiety caused by these thoughts. The thoughts or actions may be about checking on things, touching things, or worry about germs. This is an obsessive-compulsive disorder.  Strong fear of an object, place, or condition. This is a phobia.  Fear that others think bad  things about you or being put down by other people. This is social anxiety.  Nightmares, flashbacks, staying away from people, or having panic attacks when reminded of a shocking or hurtful time or place from the past. This is post-traumatic stress.  Anxiety disorder may be treated in many ways. Some kinds of treatment have you talk about your beliefs, fears, and worries. You may learn how certain thoughts or feelings can raise anxiety. You may also learn what steps to take to lower anxiety. Other kinds of treatment may have you look back on a hurtful event, sad memory, or something you are afraid of. The doctor will help you deal with the feelings that you may have. You may learn ways to cope with unwanted events or thoughts by looking at your fears in a way that feels safe.  What care is needed at home?   Ask your doctor what you need to do when you go home. Make sure you ask questions if you do not understand what the doctor says.  Set a time to talk with a counselor about your worries and feelings. This can help you with your anxiety.  Take care to follow all instructions when you take your medicines.  Limit alcohol and caffeine.  Learn ways to manage stress. Relaxation methods like reflection, deep breathing, and muscle relaxation may be helpful. Things like yoga, exercise, and maya chi are also good.  Talk about your feelings with family members and friends you trust. Talk to someone who can help you see how your thoughts at certain times may raise your anxiety.     What follow-up care is needed?   Your doctor may ask you to make visits to the office to check on your progress. Be sure to keep these visits.  What drugs may be needed?   The doctor may order drugs to help the physical signs of anxiety. Make sure that you take the drugs as taught to you by the doctor. Talk with your doctor about any side effects and ask how long you should take the drug.  Will physical activity be limited?   You may take part in  physical activities. Some people are limited because of their anxiety or fear. Talk with your doctor about the right amount of activity for you.  What changes to diet are needed?   Eat a variety of healthy foods and limit drinks with caffeine. You should avoid alcohol, energy drinks, and over-the-counter stimulants.  What problems could happen?   If your anxiety is not treated, it can result in:  Staying away from work or social events  Not being able to do everyday tasks  Keeping away from family and friends  What can be done to prevent this health problem?   Learn what events, people, or things upset you. Limit your contact with them.  Talk about your feelings. Talk to someone who can help you see how your thoughts at certain times may raise your anxiety.  Seek support from your friends and family. Find someone who calms you down. Ask if you can call them when you are getting anxious.  When do I need to call the doctor?   You feel you may harm yourself or someone else.  You can also call a mental health hotline for help.  You have any physical symptoms, such as chest pain, trouble breathing, or severe belly pain, that could be a sign of a serious problem.  If you are short of breath.  If you do not feel like you can be alone.  Teach Back: Helping You Understand   The Teach Back Method helps you understand the information we are giving you. After you talk with the staff, tell them in your own words what you learned. This helps to make sure the staff has described each thing clearly. It also helps to explain things that may have been confusing. Before going home, make sure you can do these:  I can tell you about my condition and the drugs I need to take.  I can tell you what may help lower my anxiety.  I can tell you what I will do if it is hard to breathe or I have chest pain.  I can tell you what I will do if I do not feel safe or cannot be alone.  Where can I learn more?    [facility]  https://www.chrissy.org/Learn-More/Mental-Health-Conditions/Anxiety-Disorders   National Health Service  https://www.nhs.uk/conditions/generalised-anxiety-disorder/symptoms/   [facility] [place] [facility] ? [facility]  https://www.[place]ny.nih.gov/health/relieving-stress-anxiety-cmuljvmpe-lleothxnxh-ljvivrjpth   [place] of Mental Health  http://www.[facility].nih.gov/health/publications/anxiety-disorders/complete-index.shtml   Last Reviewed Date   2021-06-08  Consumer Information Use and Disclaimer   This information is not specific medical advice and does not replace information you receive from your health care provider. This is only a brief summary of general information. It does NOT include all information about conditions, illnesses, injuries, tests, procedures, treatments, therapies, discharge instructions or life-style choices that may apply to you. You must talk with your health care provider for complete information about your health and treatment options. This information should not be used to decide whether or not to accept your health care providers advice, instructions or recommendations. Only your health care provider has the knowledge and training to provide advice that is right for you.  Copyright   Copyright © 2021 UpToDate, Inc. and its affiliates and/or licensors. All rights reserved.    Patient Education       Hypothyroidism (Underactive Thyroid)   The Basics   Written by the doctors and editors at [employer]   What is hypothyroidism? -- Hypothyroidism is a condition that makes you feel tired.  There is a gland in your neck called the thyroid gland. It makes thyroid hormone. This hormone controls how the body uses and stores energy (figure 1).  Hypothyroidism is the medical term for when a person does not make enough thyroid hormone. People sometimes confuse this condition with HYPERthyroidism, which is when a person makes too much thyroid hormone.  What are the symptoms of hypothyroidism?  -- Some people with hypothyroidism have no symptoms. But most people feel tired. That can make the condition hard to diagnose, because a lot of conditions can make you tired.  Other symptoms of hypothyroidism include:  Lack of energy  Getting cold easily  Developing coarse or thin hair  Getting constipated (having too few bowel movements)  If it is not treated, hypothyroidism can also weaken and slow your heart. This can make you feel out of breath or tired when you exercise and cause swelling (fluid buildup) in your ankles. Untreated hypothyroidism can also increase your blood pressure and raise your cholesterol - both of which increase the risk of heart trouble.  In women, hypothyroidism can disrupt monthly periods. It can also make it hard to get pregnant. In women who do get pregnant, hypothyroidism can cause problems. For instance, it can increase the chances of having a miscarriage. (A miscarriage is when a pregnancy ends on its own before the woman has been pregnant for 20 weeks.)  Is there a test for hypothyroidism? -- Yes. Your doctor or nurse can test you for hypothyroidism using a simple blood test.  How is hypothyroidism treated? -- Treatment for hypothyroidism involves taking thyroid hormone pills every day. After you take the pills for about 6 weeks, your doctor or nurse will test your blood to make sure the levels are where they should be. They might adjust your dose depending on the results. Most people with hypothyroidism need to be on thyroid pills for the rest of their life.  Thyroid hormone pills come in different brand name and generic forms. All the pills work equally well. But you should not switch from one generic or brand name to another. Switching between pills can cause your levels to go up and down.  Never change your dose of thyroid hormone on your own. Taking too much thyroid hormone can cause heart rhythm problems and even damage your bones.  What if I want to get pregnant? -- You can  try to get pregnant. Many women with hypothyroidism have healthy pregnancies. But your doctor or nurse will most likely need to change your dose of thyroid hormone once you are pregnant. That's because you need more thyroid hormone during pregnancy. They will also measure your levels of thyroid hormone 4 weeks after any change in your dose, and at least once during each trimester of pregnancy.  All topics are updated as new evidence becomes available and our peer review process is complete.  This topic retrieved from [employer] on: Sep 21, 2021.  Topic 24288 Version 8.0  Release: 29.4.2 - C29.263  © 2021 UpToDate, Inc. and/or its affiliates. All rights reserved.  figure 1: Thyroid and parathyroid glands     The thyroid is a butterfly-shaped gland in the middle of the neck. It sits just below the larynx (voice box). The thyroid makes two hormones, called T3 and T4, which control how the body uses and stores energy. The parathyroid glands are four small glands behind the thyroid. They make a hormone called parathyroid hormone, which helps control the amount of calcium in the blood.  Graphic 26180 Version 9.0    Consumer Information Use and Disclaimer   This information is not specific medical advice and does not replace information you receive from your health care provider. This is only a brief summary of general information. It does NOT include all information about conditions, illnesses, injuries, tests, procedures, treatments, therapies, discharge instructions or life-style choices that may apply to you. You must talk with your health care provider for complete information about your health and treatment options. This information should not be used to decide whether or not to accept your health care provider's advice, instructions or recommendations. Only your health care provider has the knowledge and training to provide advice that is right for you. The use of this information is governed by the [employer] End User  License Agreement, available at https://www.[employer]/en/solutions/lexicomp/about/justen.The use of [employer] content is governed by the [employer] Terms of Use. ©2021 UpToDate, Inc. All rights reserved.  Copyright   © 2021 UpToDate, Inc. and/or its affiliates. All rights reserved.      Risks, benefits, and alternatives discussed with patient, Patient verbalized understanding of discussed plan of care. Asked patient if any further questions, answered no.    Future Appointments   Date Time Provider Department Center   11/30/2022  9:00 AM [facility] Campbell NP [facility]

## 2022-08-29 NOTE — PATIENT INSTRUCTIONS
RTC in 3 months for F/U or sooner if needed.    Keep appts with specialists as scheduled.    Instructed patient to report to nearest ER or call 911 if begins to have difficulty breathing, turning blue, chest pain, B/P < 80/60 or >170/100, palpitations, syncope, extreme weakness, or severe H/A. Patient verbalized understanding.      Patient Education       Anxiety Discharge Instructions, Adult   About this topic   Anxiety can cause you to feel very worried. It can also cause physical symptoms like chest pain, stomach aches, or trouble sleeping. While mild anxiety is a normal response to stress, it can cause you problems in your everyday life. You may need follow-up care to help manage your anxiety. Anxiety happens in many forms, like:  Being scared all the time that something bad is going to happen. This is generalized anxiety.  Strong bursts of fear where your body has signs that may feel like a heart attack. This is called a panic attack.  Upsetting thoughts that happen often. There is a need to repeat doing certain things to help get rid of the anxiety caused by these thoughts. The thoughts or actions may be about checking on things, touching things, or worry about germs. This is an obsessive-compulsive disorder.  Strong fear of an object, place, or condition. This is a phobia.  Fear that others think bad things about you or being put down by other people. This is social anxiety.  Nightmares, flashbacks, staying away from people, or having panic attacks when reminded of a shocking or hurtful time or place from the past. This is post-traumatic stress.  Anxiety disorder may be treated in many ways. Some kinds of treatment have you talk about your beliefs, fears, and worries. You may learn how certain thoughts or feelings can raise anxiety. You may also learn what steps to take to lower anxiety. Other kinds of treatment may have you look back on a hurtful event, sad memory, or something you are afraid of. The doctor  will help you deal with the feelings that you may have. You may learn ways to cope with unwanted events or thoughts by looking at your fears in a way that feels safe.  What care is needed at home?   Ask your doctor what you need to do when you go home. Make sure you ask questions if you do not understand what the doctor says.  Set a time to talk with a counselor about your worries and feelings. This can help you with your anxiety.  Take care to follow all instructions when you take your medicines.  Limit alcohol and caffeine.  Learn ways to manage stress. Relaxation methods like reflection, deep breathing, and muscle relaxation may be helpful. Things like yoga, exercise, and maya chi are also good.  Talk about your feelings with family members and friends you trust. Talk to someone who can help you see how your thoughts at certain times may raise your anxiety.     What follow-up care is needed?   Your doctor may ask you to make visits to the office to check on your progress. Be sure to keep these visits.  What drugs may be needed?   The doctor may order drugs to help the physical signs of anxiety. Make sure that you take the drugs as taught to you by the doctor. Talk with your doctor about any side effects and ask how long you should take the drug.  Will physical activity be limited?   You may take part in physical activities. Some people are limited because of their anxiety or fear. Talk with your doctor about the right amount of activity for you.  What changes to diet are needed?   Eat a variety of healthy foods and limit drinks with caffeine. You should avoid alcohol, energy drinks, and over-the-counter stimulants.  What problems could happen?   If your anxiety is not treated, it can result in:  Staying away from work or social events  Not being able to do everyday tasks  Keeping away from family and friends  What can be done to prevent this health problem?   Learn what events, people, or things upset you. Limit  your contact with them.  Talk about your feelings. Talk to someone who can help you see how your thoughts at certain times may raise your anxiety.  Seek support from your friends and family. Find someone who calms you down. Ask if you can call them when you are getting anxious.  When do I need to call the doctor?   You feel you may harm yourself or someone else.  You can also call a mental health hotline for help.  You have any physical symptoms, such as chest pain, trouble breathing, or severe belly pain, that could be a sign of a serious problem.  If you are short of breath.  If you do not feel like you can be alone.  Teach Back: Helping You Understand   The Teach Back Method helps you understand the information we are giving you. After you talk with the staff, tell them in your own words what you learned. This helps to make sure the staff has described each thing clearly. It also helps to explain things that may have been confusing. Before going home, make sure you can do these:  I can tell you about my condition and the drugs I need to take.  I can tell you what may help lower my anxiety.  I can tell you what I will do if it is hard to breathe or I have chest pain.  I can tell you what I will do if I do not feel safe or cannot be alone.  Where can I learn more?   [facility]  https://www.chrissy.org/Learn-More/Mental-Health-Conditions/Anxiety-Disorders   National Health Service  https://www.nhs.uk/conditions/generalised-anxiety-disorder/symptoms/   [place] [facility] ? [facility]  https://www.renny.nih.gov/health/relieving-stress-anxiety-kxvvhvlvo-memuylfelv-dqmiznvaqy   [place] of Mental Health  http://www.nimh.nih.gov/health/publications/anxiety-disorders/complete-index.shtml   Last Reviewed Date   2021-06-08  Consumer Information Use and Disclaimer   This information is not specific medical advice and does not replace information you receive from your health care provider. This is only a brief  summary of general information. It does NOT include all information about conditions, illnesses, injuries, tests, procedures, treatments, therapies, discharge instructions or life-style choices that may apply to you. You must talk with your health care provider for complete information about your health and treatment options. This information should not be used to decide whether or not to accept your health care providers advice, instructions or recommendations. Only your health care provider has the knowledge and training to provide advice that is right for you.  Copyright   Copyright © 2021 UpToDate, Inc. and its affiliates and/or licensors. All rights reserved.    Patient Education       Hypothyroidism (Underactive Thyroid)   The Basics   Written by the doctors and editors at [employer]   What is hypothyroidism? -- Hypothyroidism is a condition that makes you feel tired.  There is a gland in your neck called the thyroid gland. It makes thyroid hormone. This hormone controls how the body uses and stores energy (figure 1).  Hypothyroidism is the medical term for when a person does not make enough thyroid hormone. People sometimes confuse this condition with HYPERthyroidism, which is when a person makes too much thyroid hormone.  What are the symptoms of hypothyroidism? -- Some people with hypothyroidism have no symptoms. But most people feel tired. That can make the condition hard to diagnose, because a lot of conditions can make you tired.  Other symptoms of hypothyroidism include:  Lack of energy  Getting cold easily  Developing coarse or thin hair  Getting constipated (having too few bowel movements)  If it is not treated, hypothyroidism can also weaken and slow your heart. This can make you feel out of breath or tired when you exercise and cause swelling (fluid buildup) in your ankles. Untreated hypothyroidism can also increase your blood pressure and raise your cholesterol - both of which increase the risk of  heart trouble.  In women, hypothyroidism can disrupt monthly periods. It can also make it hard to get pregnant. In women who do get pregnant, hypothyroidism can cause problems. For instance, it can increase the chances of having a miscarriage. (A miscarriage is when a pregnancy ends on its own before the woman has been pregnant for 20 weeks.)  Is there a test for hypothyroidism? -- Yes. Your doctor or nurse can test you for hypothyroidism using a simple blood test.  How is hypothyroidism treated? -- Treatment for hypothyroidism involves taking thyroid hormone pills every day. After you take the pills for about 6 weeks, your doctor or nurse will test your blood to make sure the levels are where they should be. They might adjust your dose depending on the results. Most people with hypothyroidism need to be on thyroid pills for the rest of their life.  Thyroid hormone pills come in different brand name and generic forms. All the pills work equally well. But you should not switch from one generic or brand name to another. Switching between pills can cause your levels to go up and down.  Never change your dose of thyroid hormone on your own. Taking too much thyroid hormone can cause heart rhythm problems and even damage your bones.  What if I want to get pregnant? -- You can try to get pregnant. Many women with hypothyroidism have healthy pregnancies. But your doctor or nurse will most likely need to change your dose of thyroid hormone once you are pregnant. That's because you need more thyroid hormone during pregnancy. They will also measure your levels of thyroid hormone 4 weeks after any change in your dose, and at least once during each trimester of pregnancy.  All topics are updated as new evidence becomes available and our peer review process is complete.  This topic retrieved from [employer] on: Sep 21, 2021.  Topic 05363 Version 8.0  Release: 29.4.2 - C29.263  © 2021 UpToDate, Inc. and/or its affiliates. All rights  reserved.  figure 1: Thyroid and parathyroid glands     The thyroid is a butterfly-shaped gland in the middle of the neck. It sits just below the larynx (voice box). The thyroid makes two hormones, called T3 and T4, which control how the body uses and stores energy. The parathyroid glands are four small glands behind the thyroid. They make a hormone called parathyroid hormone, which helps control the amount of calcium in the blood.  Graphic 58995 Version 9.0    Consumer Information Use and Disclaimer   This information is not specific medical advice and does not replace information you receive from your health care provider. This is only a brief summary of general information. It does NOT include all information about conditions, illnesses, injuries, tests, procedures, treatments, therapies, discharge instructions or life-style choices that may apply to you. You must talk with your health care provider for complete information about your health and treatment options. This information should not be used to decide whether or not to accept your health care provider's advice, instructions or recommendations. Only your health care provider has the knowledge and training to provide advice that is right for you. The use of this information is governed by the [employer] End User License Agreement, available at https://www.[employer]/en/solutions/[employer]/about/justen.The use of [employer] content is governed by the [employer] Terms of Use. ©2021 UpToDate, Inc. All rights reserved.  Copyright   © 2021 UpToDate, Inc. and/or its affiliates. All rights reserved.

## 2022-11-01 ENCOUNTER — PATIENT MESSAGE (OUTPATIENT)
Dept: PRIMARY CARE CLINIC | Facility: CLINIC | Age: 37
End: 2022-11-01
Payer: MEDICAID

## 2022-11-23 ENCOUNTER — PATIENT MESSAGE (OUTPATIENT)
Dept: PRIMARY CARE CLINIC | Facility: CLINIC | Age: 37
End: 2022-11-23
Payer: MEDICAID

## 2022-11-23 DIAGNOSIS — H44.009 BACTERIAL INFECTION OF EYE: Primary | ICD-10-CM

## 2022-11-23 DIAGNOSIS — B96.89 BACTERIAL INFECTION OF EYE: Primary | ICD-10-CM

## 2022-11-23 DIAGNOSIS — H10.9 BACTERIAL CONJUNCTIVITIS OF RIGHT EYE: ICD-10-CM

## 2022-11-23 RX ORDER — OFLOXACIN 3 MG/ML
1 SOLUTION/ DROPS OPHTHALMIC 4 TIMES DAILY
Qty: 5 ML | Refills: 0 | Status: SHIPPED | OUTPATIENT
Start: 2022-11-23 | End: 2022-11-28

## 2022-11-23 RX ORDER — AMOXICILLIN 500 MG/1
500 TABLET, FILM COATED ORAL EVERY 12 HOURS
Qty: 14 TABLET | Refills: 0 | Status: SHIPPED | OUTPATIENT
Start: 2022-11-23 | End: 2022-11-29 | Stop reason: SDUPTHER

## 2022-11-23 NOTE — TELEPHONE ENCOUNTER
Spoke to patient and notified her I have ordered eye drops and an oral antibiotic in case the drops are not effective. Patient in agreement.

## 2022-11-28 ENCOUNTER — PATIENT MESSAGE (OUTPATIENT)
Dept: PRIMARY CARE CLINIC | Facility: CLINIC | Age: 37
End: 2022-11-28
Payer: MEDICAID

## 2022-11-28 DIAGNOSIS — H44.009 BACTERIAL INFECTION OF EYE: ICD-10-CM

## 2022-11-28 DIAGNOSIS — B96.89 BACTERIAL INFECTION OF EYE: ICD-10-CM

## 2022-11-29 ENCOUNTER — PATIENT MESSAGE (OUTPATIENT)
Dept: PRIMARY CARE CLINIC | Facility: CLINIC | Age: 37
End: 2022-11-29
Payer: MEDICAID

## 2022-11-29 RX ORDER — AMOXICILLIN 500 MG/1
500 TABLET, FILM COATED ORAL EVERY 12 HOURS
Qty: 10 TABLET | Refills: 0 | Status: SHIPPED | OUTPATIENT
Start: 2022-11-29 | End: 2022-12-04

## 2022-12-27 ENCOUNTER — PATIENT MESSAGE (OUTPATIENT)
Dept: PRIMARY CARE CLINIC | Facility: CLINIC | Age: 37
End: 2022-12-27
Payer: MEDICAID

## 2023-01-04 ENCOUNTER — PATIENT MESSAGE (OUTPATIENT)
Dept: OBSTETRICS AND GYNECOLOGY | Facility: CLINIC | Age: 38
End: 2023-01-04
Payer: MEDICAID

## 2023-01-04 ENCOUNTER — PATIENT MESSAGE (OUTPATIENT)
Dept: PRIMARY CARE CLINIC | Facility: CLINIC | Age: 38
End: 2023-01-04
Payer: MEDICAID

## 2023-02-16 ENCOUNTER — PATIENT MESSAGE (OUTPATIENT)
Dept: PRIMARY CARE CLINIC | Facility: CLINIC | Age: 38
End: 2023-02-16
Payer: MEDICAID

## 2023-02-16 ENCOUNTER — HOSPITAL ENCOUNTER (OUTPATIENT)
Dept: RADIOLOGY | Facility: CLINIC | Age: 38
Discharge: HOME | End: 2023-02-16
Attending: NURSE PRACTITIONER
Payer: MEDICAID

## 2023-02-16 ENCOUNTER — OFFICE VISIT (OUTPATIENT)
Dept: UROLOGY | Facility: CLINIC | Age: 38
End: 2023-02-16
Payer: MEDICAID

## 2023-02-16 VITALS — DIASTOLIC BLOOD PRESSURE: 86 MMHG | SYSTOLIC BLOOD PRESSURE: 125 MMHG | HEART RATE: 87 BPM

## 2023-02-16 DIAGNOSIS — N20.0 KIDNEY STONE: ICD-10-CM

## 2023-02-16 DIAGNOSIS — N20.0 KIDNEY STONE: Primary | ICD-10-CM

## 2023-02-16 PROCEDURE — 1159F MED LIST DOCD IN RCRD: CPT | Mod: CPTII,S$GLB,, | Performed by: NURSE PRACTITIONER

## 2023-02-16 PROCEDURE — 74018 RADEX ABDOMEN 1 VIEW: CPT | Mod: 26,,, | Performed by: RADIOLOGY

## 2023-02-16 PROCEDURE — 74018 XR ABDOMEN AP 1 VIEW: ICD-10-PCS | Mod: 26,,, | Performed by: RADIOLOGY

## 2023-02-16 PROCEDURE — 3074F PR MOST RECENT SYSTOLIC BLOOD PRESSURE < 130 MM HG: ICD-10-PCS | Mod: CPTII,S$GLB,, | Performed by: NURSE PRACTITIONER

## 2023-02-16 PROCEDURE — 1160F PR REVIEW ALL MEDS BY PRESCRIBER/CLIN PHARMACIST DOCUMENTED: ICD-10-PCS | Mod: CPTII,S$GLB,, | Performed by: NURSE PRACTITIONER

## 2023-02-16 PROCEDURE — 3074F SYST BP LT 130 MM HG: CPT | Mod: CPTII,S$GLB,, | Performed by: NURSE PRACTITIONER

## 2023-02-16 PROCEDURE — 99214 OFFICE O/P EST MOD 30 MIN: CPT | Mod: S$GLB,,, | Performed by: NURSE PRACTITIONER

## 2023-02-16 PROCEDURE — 3079F DIAST BP 80-89 MM HG: CPT | Mod: CPTII,S$GLB,, | Performed by: NURSE PRACTITIONER

## 2023-02-16 PROCEDURE — 1160F RVW MEDS BY RX/DR IN RCRD: CPT | Mod: CPTII,S$GLB,, | Performed by: NURSE PRACTITIONER

## 2023-02-16 PROCEDURE — 1159F PR MEDICATION LIST DOCUMENTED IN MEDICAL RECORD: ICD-10-PCS | Mod: CPTII,S$GLB,, | Performed by: NURSE PRACTITIONER

## 2023-02-16 PROCEDURE — 3079F PR MOST RECENT DIASTOLIC BLOOD PRESSURE 80-89 MM HG: ICD-10-PCS | Mod: CPTII,S$GLB,, | Performed by: NURSE PRACTITIONER

## 2023-02-16 PROCEDURE — 99214 PR OFFICE/OUTPT VISIT, EST, LEVL IV, 30-39 MIN: ICD-10-PCS | Mod: S$GLB,,, | Performed by: NURSE PRACTITIONER

## 2023-02-16 NOTE — PROGRESS NOTES
Subjective:       Patient ID: Sari Fernandez is a 37 y.o. female.    Chief Complaint: Nephrolithiasis      HPI: 37-year-old female known to service history of kidney stones.  She is a frequent stone former and has had spontaneous passage of stones as well as ESWL and ureteroscopy.  Several days ago she felt discomfort on the right flank area she is unsure if it was a kidney stone or UTI.  She had some antibiotic left over from a previous bladder infection started the medication made an appointment here today she is not had any pain since that time.  She wanted a x-ray to ensure she had no big stone burden at present.  Today she feels well, she does report constipation.  She is been afebrile.       Past Medical History:   Past Medical History:   Diagnosis Date    ADHD     ADHD     Anemia     Anxiety     Anxiety     Kidney stones     Urinary tract infection        Past Surgical Historical:   Past Surgical History:   Procedure Laterality Date     SECTION, LOW TRANSVERSE      KIDNEY STONE SURGERY      LITHOTRIPSY          Medications:      Past Social History:   Social History     Socioeconomic History    Marital status: Single   Tobacco Use    Smoking status: Never    Smokeless tobacco: Never   Substance and Sexual Activity    Alcohol use: Not Currently    Drug use: Not Currently    Sexual activity: Not Currently     Partners: Male       Allergies: Review of patient's allergies indicates:  No Known Allergies     Family History:   Family History   Problem Relation Age of Onset    Cancer Paternal Grandmother     Cancer Father         Review of Systems:  Review of Systems   Constitutional:  Negative for activity change and appetite change.   HENT:  Negative for congestion and dental problem.    Respiratory:  Negative for chest tightness and shortness of breath.    Cardiovascular:  Negative for chest pain.   Gastrointestinal:  Negative for abdominal distention and abdominal pain.   Genitourinary:  Negative for  decreased urine volume, difficulty urinating, dyspareunia, dysuria, enuresis, flank pain, frequency, genital sores, hematuria, pelvic pain and urgency.   Musculoskeletal:  Negative for back pain and neck pain.   Allergic/Immunologic: Negative for immunocompromised state.   Neurological:  Negative for dizziness.   Hematological:  Negative for adenopathy.   Psychiatric/Behavioral:  Negative for agitation, behavioral problems and confusion.      Physical Exam:  Physical Exam  Constitutional:       Appearance: She is well-developed.   HENT:      Head: Normocephalic and atraumatic.   Eyes:      General: No scleral icterus.  Pulmonary:      Effort: Pulmonary effort is normal.      Breath sounds: Normal breath sounds.   Abdominal:      General: There is no distension.      Palpations: Abdomen is soft.      Tenderness: There is no abdominal tenderness.   Genitourinary:     Exam position: Supine.      Labia:         Right: No rash, tenderness or lesion.         Left: No rash, tenderness or lesion.       Vagina: Normal.      Rectum: Normal.   Musculoskeletal:      Cervical back: Normal range of motion.   Skin:     General: Skin is warm and dry.   Neurological:      Mental Status: She is alert and oriented to person, place, and time.       Assessment/Plan:   Kidney stones by history with an episode of right flank pain now resolved prior to arrival--urinalysis negative.  KUB 1 small stones seen intrarenal left.  Large volume of stool throughout the entire colon rectal vault making observation suboptimal.  No obvious stones seen along the course of the ureters.  No further workup indicated at this time.    Constipation--recommendation over-the-counter MiraLax/Colace as directed patient verbalized understanding of same.  Problem List Items Addressed This Visit    None  Visit Diagnoses       Kidney stone    -  Primary    Relevant Orders    X-Ray Abdomen AP 1 View

## 2023-03-31 ENCOUNTER — PATIENT MESSAGE (OUTPATIENT)
Dept: FAMILY MEDICINE | Facility: CLINIC | Age: 38
End: 2023-03-31
Payer: MEDICAID

## 2023-03-31 ENCOUNTER — PATIENT MESSAGE (OUTPATIENT)
Dept: PRIMARY CARE CLINIC | Facility: CLINIC | Age: 38
End: 2023-03-31
Payer: MEDICAID

## 2023-05-09 ENCOUNTER — PATIENT MESSAGE (OUTPATIENT)
Dept: PRIMARY CARE CLINIC | Facility: CLINIC | Age: 38
End: 2023-05-09
Payer: MEDICAID

## 2023-05-09 DIAGNOSIS — Z87.442 HISTORY OF KIDNEY STONES: ICD-10-CM

## 2023-05-09 DIAGNOSIS — Z13.220 SCREENING CHOLESTEROL LEVEL: ICD-10-CM

## 2023-05-09 DIAGNOSIS — E03.8 OTHER SPECIFIED HYPOTHYROIDISM: Primary | ICD-10-CM

## 2023-05-09 DIAGNOSIS — Z13.1 DIABETES MELLITUS SCREENING: ICD-10-CM

## 2023-05-09 DIAGNOSIS — R35.0 URINE FREQUENCY: ICD-10-CM

## 2023-05-09 DIAGNOSIS — R53.83 OTHER FATIGUE: ICD-10-CM

## 2023-05-09 DIAGNOSIS — E53.8 VITAMIN B12 DEFICIENCY: ICD-10-CM

## 2023-05-09 DIAGNOSIS — Z00.00 ANNUAL PHYSICAL EXAM: ICD-10-CM

## 2023-05-09 DIAGNOSIS — E55.9 VITAMIN D DEFICIENCY: ICD-10-CM

## 2023-05-15 LAB
ALBUMIN/GLOB SERPL ELPH: 1.8 {RATIO} (ref 1–2.7)
ALBUMIN: 4.6 G/DL (ref 3.5–5.2)
ALP SERPL-CCNC: 101 U/L (ref 35–105)
ALT SERPL W P-5'-P-CCNC: 12 U/L (ref 0–33)
ANION GAP SERPL CALC-SCNC: 9 MMOL/L (ref 8–17)
AST SERPL-CCNC: 15 U/L (ref 0–32)
BILIRUB SERPL-MCNC: 0.5 MG/DL (ref 0–1.2)
BUN SERPL-MCNC: 13.1 MG/DL (ref 6–20)
BUN/CREAT RATIO: 15.2 (ref 6–20)
CALCIUM SERPL-MCNC: 9.4 MG/DL (ref 8.6–10.2)
CHLORIDE SERPL-SCNC: 103 MMOL/L (ref 98–107)
CHOLEST SERPL-MSCNC: 174 MG/DL (ref 100–200)
CO2 SERPL-SCNC: 26 MMOL/L (ref 22–29)
CREAT SERPL-MCNC: 0.9 MG/DL (ref 0.5–0.9)
GFR ESTIMATION: 90.29
GLOBULIN: 2.6 G/DL (ref 1.5–4.5)
GLUCOSE SERPL-MCNC: 89 MG/DL (ref 74–106)
HBA1C MFR BLD: 4.8 % (ref 4–6)
HDLC SERPL-MCNC: 64 MG/DL
LDL/HDL RATIO: 1.5 (ref 1–3)
LDLC SERPL CALC-MCNC: 98 MG/DL (ref 0–100)
POTASSIUM SERPL-SCNC: 4.2 MMOL/L (ref 3.5–5.1)
PROT SNV-MCNC: 7.2 G/DL (ref 6.4–8.3)
SODIUM BLD-SCNC: 138 MMOL/L (ref 136–145)
TRIGL SERPL-MCNC: 59 MG/DL (ref 0–150)

## 2023-05-24 ENCOUNTER — OFFICE VISIT (OUTPATIENT)
Dept: PRIMARY CARE CLINIC | Facility: CLINIC | Age: 38
End: 2023-05-24
Payer: MEDICAID

## 2023-05-24 VITALS
HEIGHT: 64 IN | OXYGEN SATURATION: 100 % | BODY MASS INDEX: 25.95 KG/M2 | DIASTOLIC BLOOD PRESSURE: 86 MMHG | SYSTOLIC BLOOD PRESSURE: 125 MMHG | HEART RATE: 67 BPM | RESPIRATION RATE: 18 BRPM | WEIGHT: 152 LBS

## 2023-05-24 DIAGNOSIS — Z00.00 ANNUAL PHYSICAL EXAM: Primary | ICD-10-CM

## 2023-05-24 DIAGNOSIS — E03.8 OTHER SPECIFIED HYPOTHYROIDISM: ICD-10-CM

## 2023-05-24 PROCEDURE — 99395 PREV VISIT EST AGE 18-39: CPT | Mod: S$GLB,,, | Performed by: NURSE PRACTITIONER

## 2023-05-24 PROCEDURE — 3008F BODY MASS INDEX DOCD: CPT | Mod: CPTII,S$GLB,, | Performed by: NURSE PRACTITIONER

## 2023-05-24 PROCEDURE — 3074F SYST BP LT 130 MM HG: CPT | Mod: CPTII,S$GLB,, | Performed by: NURSE PRACTITIONER

## 2023-05-24 PROCEDURE — 3079F PR MOST RECENT DIASTOLIC BLOOD PRESSURE 80-89 MM HG: ICD-10-PCS | Mod: CPTII,S$GLB,, | Performed by: NURSE PRACTITIONER

## 2023-05-24 PROCEDURE — 99395 PR PREVENTIVE VISIT,EST,18-39: ICD-10-PCS | Mod: S$GLB,,, | Performed by: NURSE PRACTITIONER

## 2023-05-24 PROCEDURE — 1159F MED LIST DOCD IN RCRD: CPT | Mod: CPTII,S$GLB,, | Performed by: NURSE PRACTITIONER

## 2023-05-24 PROCEDURE — 3079F DIAST BP 80-89 MM HG: CPT | Mod: CPTII,S$GLB,, | Performed by: NURSE PRACTITIONER

## 2023-05-24 PROCEDURE — 1160F PR REVIEW ALL MEDS BY PRESCRIBER/CLIN PHARMACIST DOCUMENTED: ICD-10-PCS | Mod: CPTII,S$GLB,, | Performed by: NURSE PRACTITIONER

## 2023-05-24 PROCEDURE — 3008F PR BODY MASS INDEX (BMI) DOCUMENTED: ICD-10-PCS | Mod: CPTII,S$GLB,, | Performed by: NURSE PRACTITIONER

## 2023-05-24 PROCEDURE — 1159F PR MEDICATION LIST DOCUMENTED IN MEDICAL RECORD: ICD-10-PCS | Mod: CPTII,S$GLB,, | Performed by: NURSE PRACTITIONER

## 2023-05-24 PROCEDURE — 1160F RVW MEDS BY RX/DR IN RCRD: CPT | Mod: CPTII,S$GLB,, | Performed by: NURSE PRACTITIONER

## 2023-05-24 PROCEDURE — 3074F PR MOST RECENT SYSTOLIC BLOOD PRESSURE < 130 MM HG: ICD-10-PCS | Mod: CPTII,S$GLB,, | Performed by: NURSE PRACTITIONER

## 2023-05-24 NOTE — PATIENT INSTRUCTIONS
RTC in 4 months for F/U or sooner if needed.    Patient Education       Yearly Physical for Adults   About this topic   Most people do not want to be sick. Having a checkup each year with your doctor is one way to help you stay healthy. You may need to see your doctor more or less often. How often you need to go to the doctor depends on your age. Your family and medical history also play a role in how often you need to go to the doctor. Going to see your doctor on a routine basis can help you find problems early or even before they start. This may make it easier to treat or cure your problem.  General   Your doctor will talk about many things during your checkup. Your doctor may ask about:  Your medical and family history.  All the drugs you are taking. Be sure to include all prescription, over the counter, and herbal supplements. Tell the doctor if you have any drug allergy. Bring a list of drugs you take with you.  How you are feeling and if you are having any problems.  Risky behaviors like smoking, drinking alcohol, using illegal drugs, not wearing seatbelts, having unprotected sex, etc.  Your doctor will do a physical exam and may check your:  Height and weight  Blood pressure  Reflexes  Memory  Vision  Hearing  Your doctor may order:  Lab tests  ECG to check your heart rhythm  X-rays  Tests or treatments based on your exam  What lifestyle changes are needed?   Your doctor may suggest you make changes to your lifestyle at this visit. The doctor may talk with you about being more active or lowering stress levels. Ask your doctor what you need to do.  What drugs may be needed?   Your doctor may order drugs or vaccines to protect you from illnesses.  What changes to diet are needed?   Talk to your doctor to see if any changes are needed to your diet.  When do I need to call the doctor?   Call your doctor if you need to learn about any test results. Together you can make a plan for more care.  Helpful tips   Make a  list of questions for your doctor before you go. This will help you remember to ask about any concerns. Write down any answers from your doctor so you can look over them after your visit.   Tell your doctor about any changes in your body or health since your last visit.  Ask your doctor about any screening tests you need.  Where can I learn more?   American Academy of Family Physicians  http://familydoctor.org/familydoctor/en/prevention-wellness/staying-healthy/healthy-living/preventive-services-for-healthy-living.printerview.html   Centers for Disease Control  http://www.cdc.gov/family/checkup/   Last Reviewed Date   2019-04-22  Consumer Information Use and Disclaimer   This information is not specific medical advice and does not replace information you receive from your health care provider. This is only a brief summary of general information. It does NOT include all information about conditions, illnesses, injuries, tests, procedures, treatments, therapies, discharge instructions or life-style choices that may apply to you. You must talk with your health care provider for complete information about your health and treatment options. This information should not be used to decide whether or not to accept your health care providers advice, instructions or recommendations. Only your health care provider has the knowledge and training to provide advice that is right for you.  Copyright   Copyright © 2021 [employer] and its affiliates and/or licensors. All rights reserved.

## 2023-05-24 NOTE — PROGRESS NOTES
Subjective:       Patient ID: Sari Fernandez is a 37 y.o. female.    Chief Complaint: Follow-up    HPI: Sari is a 37 y.o. female who presents for annual visit.    Annual labs were drawn on 05/15/2023 and all results are normal.    She feels great with no new issues or concerns.     History of Hypothyroidism but she refuses medication at this time. TSH increased slightly to 3.96 on recent labs from 3.56 last August and T4 Free is normal. Patient is asymptomatic and feels the thyroid may be abnormal due to her recently having a baby and breastfeeding. Her mom also has thyroid disease. She was prescribed levothyroxine 25 mcg but did not take the medication to see if her level would come down on its own and it did.     Followed by Dr. Larios for GYN. She had a baby boy 23 months ago and is currently breastfeeding him. Annual visit is scheduled on 2023 with Dr. Larios.    Denies drinking or smoking.    Refuses FLU and COVID vaccines.                    Past Medical History:   Diagnosis Date    ADHD     ADHD     Anemia     Anxiety     Anxiety     Kidney stones     Urinary tract infection        Past Surgical History:   Procedure Laterality Date     SECTION, LOW TRANSVERSE      KIDNEY STONE SURGERY      LITHOTRIPSY         Family History   Problem Relation Age of Onset    Cancer Paternal Grandmother     Cancer Father        Social History     Tobacco Use    Smoking status: Never    Smokeless tobacco: Never   Substance Use Topics    Alcohol use: Not Currently    Drug use: Not Currently       Patient Active Problem List   Diagnosis    Adult attention deficit hyperactivity disorder    Anxiety disorder    Attention deficit hyperactivity disorder, predominantly inattentive type    Bacteremia    Fatigue    Hypertension affecting pregnancy    Hypokalemia    Iron deficiency anemia    Pregnancy    Screening for malignant neoplasm of cervix    Status post  delivery    Tobacco user    Urinary tract  "infection in mother during pregnancy    Status post  section    UTI (urinary tract infection) during pregnancy    Other specified hypothyroidism       Immunization History   Administered Date(s) Administered    DTaP 1987, 1988, 1988, 1989, 1992    HIB 1999    Hepatitis B, Pediatric/Adolescent 1998, 1998, 1999    IPV 1987, 1988, 1988, 1989, 1992    MMR 1988, 1992    Tdap 1998, 2008, 2021, 2021           Review of Systems   Constitutional:  Negative for activity change, appetite change, chills, diaphoresis, fatigue and fever.   HENT:  Negative for congestion, ear pain, sinus pain, tinnitus and trouble swallowing.    Eyes:  Negative for visual disturbance.   Respiratory:  Negative for cough, chest tightness, shortness of breath and wheezing.    Cardiovascular:  Negative for chest pain, palpitations and leg swelling.   Gastrointestinal:  Negative for abdominal distention, abdominal pain, blood in stool, constipation, diarrhea, nausea and vomiting.   Endocrine: Negative for cold intolerance, heat intolerance, polydipsia, polyphagia and polyuria.   Genitourinary:  Negative for decreased urine volume, dysuria, frequency, hematuria and urgency.   Musculoskeletal:  Negative for gait problem, neck pain and neck stiffness.   Skin:  Negative for color change and rash.   Neurological:  Negative for dizziness, syncope, weakness, light-headedness, numbness and headaches.   Hematological:  Negative for adenopathy. Does not bruise/bleed easily.   Psychiatric/Behavioral:  Negative for behavioral problems, confusion and dysphoric mood. The patient is not nervous/anxious.    Objective:     Vitals:    23 0945   BP: 125/86   BP Location: Right arm   Patient Position: Sitting   BP Method: Medium (Automatic)   Pulse: 67   Resp: 18   SpO2: 100%   Weight: 68.9 kg (152 lb)   Height: 5' 4" (1.626 m) "       Physical Exam  Vitals and nursing note reviewed.   Constitutional:       General: She is not in acute distress.     Appearance: Normal appearance. She is not diaphoretic.   HENT:      Head: Normocephalic and atraumatic.      Nose: Nose normal.      Mouth/Throat:      Mouth: Mucous membranes are moist.      Pharynx: Oropharynx is clear.   Eyes:      Extraocular Movements: Extraocular movements intact.      Conjunctiva/sclera: Conjunctivae normal.      Pupils: Pupils are equal, round, and reactive to light.   Neck:      Thyroid: No thyromegaly or thyroid tenderness.   Cardiovascular:      Rate and Rhythm: Normal rate and regular rhythm.      Pulses: Normal pulses.      Heart sounds: Normal heart sounds.   Pulmonary:      Effort: Pulmonary effort is normal.      Breath sounds: Normal breath sounds. No rales.   Abdominal:      General: Bowel sounds are normal. There is no distension.      Palpations: Abdomen is soft.      Tenderness: There is no abdominal tenderness.   Musculoskeletal:         General: Normal range of motion.      Cervical back: Normal range of motion and neck supple.      Right lower leg: No edema.      Left lower leg: No edema.   Lymphadenopathy:      Cervical: No cervical adenopathy.   Skin:     General: Skin is warm and dry.      Capillary Refill: Capillary refill takes less than 2 seconds.   Neurological:      Mental Status: She is alert and oriented to person, place, and time.   Psychiatric:         Mood and Affect: Mood and affect normal.         Behavior: Behavior normal. Behavior is cooperative.         Thought Content: Thought content normal.         Judgment: Judgment normal.       No visits with results within 6 Month(s) from this visit.   Latest known visit with results is:   Office Visit on 06/27/2022   Component Date Value Ref Range Status    TSH w/reflex to FT4 08/24/2022 3.56  mIU/L Final         Assessment:      1. Annual physical exam    2. Other specified hypothyroidism           Plan:     Annual physical exam  Comments:  Labs reviewed and discussed in detail with patient's full understanding.    Other specified hypothyroidism  Comments:  Thyroid panel stable, Will continue to watch for now         No current outpatient medications on file.     No current facility-administered medications for this visit.       There are no discontinued medications.    Health Maintenance   Topic Date Due    TETANUS VACCINE  06/28/2031    Hepatitis C Screening  Completed    Lipid Panel  Completed       Patient Instructions   RTC in 4 months for F/U or sooner if needed.    Patient Education       Yearly Physical for Adults   About this topic   Most people do not want to be sick. Having a checkup each year with your doctor is one way to help you stay healthy. You may need to see your doctor more or less often. How often you need to go to the doctor depends on your age. Your family and medical history also play a role in how often you need to go to the doctor. Going to see your doctor on a routine basis can help you find problems early or even before they start. This may make it easier to treat or cure your problem.  General   Your doctor will talk about many things during your checkup. Your doctor may ask about:  Your medical and family history.  All the drugs you are taking. Be sure to include all prescription, over the counter, and herbal supplements. Tell the doctor if you have any drug allergy. Bring a list of drugs you take with you.  How you are feeling and if you are having any problems.  Risky behaviors like smoking, drinking alcohol, using illegal drugs, not wearing seatbelts, having unprotected sex, etc.  Your doctor will do a physical exam and may check your:  Height and weight  Blood pressure  Reflexes  Memory  Vision  Hearing  Your doctor may order:  Lab tests  ECG to check your heart rhythm  X-rays  Tests or treatments based on your exam  What lifestyle changes are needed?   Your doctor may  suggest you make changes to your lifestyle at this visit. The doctor may talk with you about being more active or lowering stress levels. Ask your doctor what you need to do.  What drugs may be needed?   Your doctor may order drugs or vaccines to protect you from illnesses.  What changes to diet are needed?   Talk to your doctor to see if any changes are needed to your diet.  When do I need to call the doctor?   Call your doctor if you need to learn about any test results. Together you can make a plan for more care.  Helpful tips   Make a list of questions for your doctor before you go. This will help you remember to ask about any concerns. Write down any answers from your doctor so you can look over them after your visit.   Tell your doctor about any changes in your body or health since your last visit.  Ask your doctor about any screening tests you need.  Where can I learn more?   [employer]  http://familydoctor.org/familydoctor/en/prevention-wellness/staying-healthy/healthy-living/preventive-services-for-healthy-living.printerview.html   Centers for Disease Control  http://www.cdc.gov/family/checkup/   Last Reviewed Date   2019-04-22  Consumer Information Use and Disclaimer   This information is not specific medical advice and does not replace information you receive from your health care provider. This is only a brief summary of general information. It does NOT include all information about conditions, illnesses, injuries, tests, procedures, treatments, therapies, discharge instructions or life-style choices that may apply to you. You must talk with your health care provider for complete information about your health and treatment options. This information should not be used to decide whether or not to accept your health care providers advice, instructions or recommendations. Only your health care provider has the knowledge and training to provide advice that is right for  you.  Copyright   Copyright © 2021 [employer] and its affiliates and/or licensors. All rights reserved.      Risks, benefits, and alternatives discussed with patient, Patient verbalized understanding of discussed plan of care. Asked patient if any further questions, answered no.    Future Appointments   Date Time Provider Department Center   6/8/2023  8:45 AM Jcarlos Larios MD [id number] [facility]   9/25/2023  8:00 AM Antonina Campbell NP [facility] [id number] [facility] Campbell NP

## 2023-06-01 ENCOUNTER — PATIENT OUTREACH (OUTPATIENT)
Dept: ADMINISTRATIVE | Facility: HOSPITAL | Age: 38
End: 2023-06-01
Payer: MEDICAID

## 2023-06-01 NOTE — PROGRESS NOTES
Manually uploaded and linked Hemoglobin A1c, Lipid Panel, CMP to . All other labs uploaded to media

## 2023-06-05 ENCOUNTER — PATIENT MESSAGE (OUTPATIENT)
Dept: PRIMARY CARE CLINIC | Facility: CLINIC | Age: 38
End: 2023-06-05
Payer: MEDICAID

## 2023-06-05 DIAGNOSIS — R11.0 NAUSEA: Primary | ICD-10-CM

## 2023-06-05 RX ORDER — PROMETHAZINE HYDROCHLORIDE 25 MG/1
25 TABLET ORAL EVERY 6 HOURS PRN
Qty: 30 TABLET | Refills: 0 | Status: SHIPPED | OUTPATIENT
Start: 2023-06-05 | End: 2023-06-06 | Stop reason: ALTCHOICE

## 2023-06-06 ENCOUNTER — PATIENT MESSAGE (OUTPATIENT)
Dept: PRIMARY CARE CLINIC | Facility: CLINIC | Age: 38
End: 2023-06-06
Payer: MEDICAID

## 2023-06-06 RX ORDER — ONDANSETRON 4 MG/1
4 TABLET, ORALLY DISINTEGRATING ORAL EVERY 12 HOURS PRN
Qty: 30 TABLET | Refills: 0 | Status: SHIPPED | OUTPATIENT
Start: 2023-06-06 | End: 2023-07-03

## 2023-06-08 ENCOUNTER — PATIENT MESSAGE (OUTPATIENT)
Dept: OBSTETRICS AND GYNECOLOGY | Facility: CLINIC | Age: 38
End: 2023-06-08

## 2023-07-03 ENCOUNTER — OFFICE VISIT (OUTPATIENT)
Dept: OBSTETRICS AND GYNECOLOGY | Facility: CLINIC | Age: 38
End: 2023-07-03
Payer: MEDICAID

## 2023-07-03 VITALS
HEART RATE: 83 BPM | HEIGHT: 64 IN | BODY MASS INDEX: 26.36 KG/M2 | SYSTOLIC BLOOD PRESSURE: 115 MMHG | DIASTOLIC BLOOD PRESSURE: 80 MMHG | WEIGHT: 154.38 LBS

## 2023-07-03 DIAGNOSIS — Z01.419 WELL WOMAN EXAM WITH ROUTINE GYNECOLOGICAL EXAM: Primary | ICD-10-CM

## 2023-07-03 DIAGNOSIS — Z12.4 SCREENING FOR MALIGNANT NEOPLASM OF THE CERVIX: ICD-10-CM

## 2023-07-03 PROCEDURE — 3079F DIAST BP 80-89 MM HG: CPT | Mod: CPTII,S$GLB,, | Performed by: NURSE PRACTITIONER

## 2023-07-03 PROCEDURE — 3074F PR MOST RECENT SYSTOLIC BLOOD PRESSURE < 130 MM HG: ICD-10-PCS | Mod: CPTII,S$GLB,, | Performed by: NURSE PRACTITIONER

## 2023-07-03 PROCEDURE — 3044F HG A1C LEVEL LT 7.0%: CPT | Mod: CPTII,S$GLB,, | Performed by: NURSE PRACTITIONER

## 2023-07-03 PROCEDURE — 3044F PR MOST RECENT HEMOGLOBIN A1C LEVEL <7.0%: ICD-10-PCS | Mod: CPTII,S$GLB,, | Performed by: NURSE PRACTITIONER

## 2023-07-03 PROCEDURE — 3074F SYST BP LT 130 MM HG: CPT | Mod: CPTII,S$GLB,, | Performed by: NURSE PRACTITIONER

## 2023-07-03 PROCEDURE — 99395 PR PREVENTIVE VISIT,EST,18-39: ICD-10-PCS | Mod: S$GLB,,, | Performed by: NURSE PRACTITIONER

## 2023-07-03 PROCEDURE — 1159F PR MEDICATION LIST DOCUMENTED IN MEDICAL RECORD: ICD-10-PCS | Mod: CPTII,S$GLB,, | Performed by: NURSE PRACTITIONER

## 2023-07-03 PROCEDURE — 3008F BODY MASS INDEX DOCD: CPT | Mod: CPTII,S$GLB,, | Performed by: NURSE PRACTITIONER

## 2023-07-03 PROCEDURE — 1159F MED LIST DOCD IN RCRD: CPT | Mod: CPTII,S$GLB,, | Performed by: NURSE PRACTITIONER

## 2023-07-03 PROCEDURE — 3079F PR MOST RECENT DIASTOLIC BLOOD PRESSURE 80-89 MM HG: ICD-10-PCS | Mod: CPTII,S$GLB,, | Performed by: NURSE PRACTITIONER

## 2023-07-03 PROCEDURE — 3008F PR BODY MASS INDEX (BMI) DOCUMENTED: ICD-10-PCS | Mod: CPTII,S$GLB,, | Performed by: NURSE PRACTITIONER

## 2023-07-03 PROCEDURE — 99395 PREV VISIT EST AGE 18-39: CPT | Mod: S$GLB,,, | Performed by: NURSE PRACTITIONER

## 2023-07-03 NOTE — PROGRESS NOTES
"    Subjective:       Patient ID: Sari Fernandez is a 37 y.o. female.    Chief Complaint:  Well Woman      History of Present Illness   Presents for annual gyn exam. History and past labs reviewed with patient.    Denies any complaints  Not sexually active  +breastfeeding  No hx of abnormal paps/STIs  Patient's last menstrual period was 2023 (exact date).      OB History          2    Para   2    Term   2            AB        Living   2         SAB        IAB        Ectopic        Multiple        Live Births   2                  Review of Systems  Review of Systems   Constitutional:  Negative for chills and fever.   Respiratory:  Negative for shortness of breath.    Cardiovascular:  Negative for chest pain.   Gastrointestinal:  Negative for abdominal pain, blood in stool, constipation, diarrhea, nausea, vomiting and reflux.   Genitourinary:  Negative for dysmenorrhea, dyspareunia, dysuria, hematuria, hot flashes, menorrhagia, menstrual problem, pelvic pain, vaginal bleeding, vaginal discharge, postcoital bleeding and vaginal dryness.   Musculoskeletal:  Negative for arthralgias and joint swelling.   Integumentary:  Negative for rash, hair changes, breast mass, nipple discharge and breast skin changes.   Psychiatric/Behavioral:  Negative for depression. The patient is not nervous/anxious.    Breast: Negative for asymmetry, lump, mass, nipple discharge and skin changes        Objective:     Vitals:    23 1044   BP: 115/80   Pulse: 83   Weight: 70 kg (154 lb 6 oz)   Height: 5' 4" (1.626 m)        Physical Exam:   Constitutional: She is oriented to person, place, and time. She appears well-developed and well-nourished.    HENT:   Head: Normocephalic and atraumatic.    Eyes: Pupils are equal, round, and reactive to light. Conjunctivae are normal.    Neck: No thyromegaly present.    Cardiovascular:  Normal rate, regular rhythm and normal heart sounds.             Pulmonary/Chest: Effort " normal and breath sounds normal.        Abdominal: Soft.     Genitourinary:    Inguinal canal, vagina, uterus, right adnexa, left adnexa and rectum normal.      Pelvic exam was performed with patient supine.   The external female genitalia was normal.   Labial bartholins normal.Cervix is normal. No  no vaginal discharge in the vagina.    pap smear completedUterus consistancy normal. and Uerus contour normal  Uterus is not tender.           Musculoskeletal: Normal range of motion and moves all extremeties.       Neurological: She is alert and oriented to person, place, and time. She has normal reflexes.    Skin: Skin is warm and dry.    Psychiatric: She has a normal mood and affect. Her behavior is normal. Judgment and thought content normal.     breast exam wnl- no nipple dc, skin changes, masses or lymph nodes palpated bilaterally    Assessment:     1. Well woman exam with routine gynecological exam    2. Screening for malignant neoplasm of the cervix              Plan:       Well woman exam with routine gynecological exam    Screening for malignant neoplasm of the cervix  -     Liquid-based pap smear, screening       OTC Women's daily Multi vitamin  Healthy diet, exercise and lifestyle discussed  Risk assessment for inherited gyn cancer done-negative  Patient was counseled today on A.C.S. Pap guidelines and recommendations for yearly pelvic exams, mammograms and monthly self breast exams; to see her PCP for other health maintenance.        Follow up in about 1 year (around 7/3/2024).

## 2023-07-10 LAB — Lab: NORMAL

## 2023-07-25 ENCOUNTER — PATIENT MESSAGE (OUTPATIENT)
Dept: PRIMARY CARE CLINIC | Facility: CLINIC | Age: 38
End: 2023-07-25
Payer: MEDICAID

## 2023-08-14 ENCOUNTER — OFFICE VISIT (OUTPATIENT)
Dept: UROLOGY | Facility: CLINIC | Age: 38
End: 2023-08-14
Payer: MEDICAID

## 2023-08-14 ENCOUNTER — HOSPITAL ENCOUNTER (OUTPATIENT)
Dept: RADIOLOGY | Facility: CLINIC | Age: 38
Discharge: HOME | End: 2023-08-14
Attending: FAMILY MEDICINE
Payer: MEDICAID

## 2023-08-14 VITALS
BODY MASS INDEX: 26.29 KG/M2 | RESPIRATION RATE: 18 BRPM | DIASTOLIC BLOOD PRESSURE: 103 MMHG | HEART RATE: 78 BPM | SYSTOLIC BLOOD PRESSURE: 134 MMHG | HEIGHT: 64 IN | WEIGHT: 154 LBS

## 2023-08-14 DIAGNOSIS — R10.9 RIGHT FLANK PAIN: ICD-10-CM

## 2023-08-14 DIAGNOSIS — N20.0 KIDNEY STONE: Primary | ICD-10-CM

## 2023-08-14 DIAGNOSIS — R30.0 DYSURIA: ICD-10-CM

## 2023-08-14 LAB
BILIRUBIN, UA POC OHS: NEGATIVE
BLOOD, UA POC OHS: NEGATIVE
CLARITY, UA POC OHS: CLEAR
COLOR, UA POC OHS: YELLOW
GLUCOSE, UA POC OHS: NEGATIVE
KETONES, UA POC OHS: NEGATIVE
LEUKOCYTES, UA POC OHS: NEGATIVE
NITRITE, UA POC OHS: NEGATIVE
PH, UA POC OHS: 6
PROTEIN, UA POC OHS: NEGATIVE
SPECIFIC GRAVITY, UA POC OHS: 1.02
UROBILINOGEN, UA POC OHS: 0.2

## 2023-08-14 PROCEDURE — 74018 XR ABDOMEN AP 1 VIEW: ICD-10-PCS | Mod: 26,,, | Performed by: RADIOLOGY

## 2023-08-14 PROCEDURE — 81003 POCT URINALYSIS(INSTRUMENT): ICD-10-PCS | Mod: QW,S$GLB,, | Performed by: FAMILY MEDICINE

## 2023-08-14 PROCEDURE — 81003 URINALYSIS AUTO W/O SCOPE: CPT | Mod: QW,S$GLB,, | Performed by: FAMILY MEDICINE

## 2023-08-14 PROCEDURE — 1160F RVW MEDS BY RX/DR IN RCRD: CPT | Mod: CPTII,S$GLB,, | Performed by: FAMILY MEDICINE

## 2023-08-14 PROCEDURE — 99214 OFFICE O/P EST MOD 30 MIN: CPT | Mod: S$GLB,,, | Performed by: FAMILY MEDICINE

## 2023-08-14 PROCEDURE — 3080F PR MOST RECENT DIASTOLIC BLOOD PRESSURE >= 90 MM HG: ICD-10-PCS | Mod: CPTII,S$GLB,, | Performed by: FAMILY MEDICINE

## 2023-08-14 PROCEDURE — 1159F PR MEDICATION LIST DOCUMENTED IN MEDICAL RECORD: ICD-10-PCS | Mod: CPTII,S$GLB,, | Performed by: FAMILY MEDICINE

## 2023-08-14 PROCEDURE — 3008F BODY MASS INDEX DOCD: CPT | Mod: CPTII,S$GLB,, | Performed by: FAMILY MEDICINE

## 2023-08-14 PROCEDURE — 1159F MED LIST DOCD IN RCRD: CPT | Mod: CPTII,S$GLB,, | Performed by: FAMILY MEDICINE

## 2023-08-14 PROCEDURE — 99214 PR OFFICE/OUTPT VISIT, EST, LEVL IV, 30-39 MIN: ICD-10-PCS | Mod: S$GLB,,, | Performed by: FAMILY MEDICINE

## 2023-08-14 PROCEDURE — 1160F PR REVIEW ALL MEDS BY PRESCRIBER/CLIN PHARMACIST DOCUMENTED: ICD-10-PCS | Mod: CPTII,S$GLB,, | Performed by: FAMILY MEDICINE

## 2023-08-14 PROCEDURE — 3008F PR BODY MASS INDEX (BMI) DOCUMENTED: ICD-10-PCS | Mod: CPTII,S$GLB,, | Performed by: FAMILY MEDICINE

## 2023-08-14 PROCEDURE — 3080F DIAST BP >= 90 MM HG: CPT | Mod: CPTII,S$GLB,, | Performed by: FAMILY MEDICINE

## 2023-08-14 PROCEDURE — 3075F PR MOST RECENT SYSTOLIC BLOOD PRESS GE 130-139MM HG: ICD-10-PCS | Mod: CPTII,S$GLB,, | Performed by: FAMILY MEDICINE

## 2023-08-14 PROCEDURE — 74018 RADEX ABDOMEN 1 VIEW: CPT | Mod: 26,,, | Performed by: RADIOLOGY

## 2023-08-14 PROCEDURE — 3044F HG A1C LEVEL LT 7.0%: CPT | Mod: CPTII,S$GLB,, | Performed by: FAMILY MEDICINE

## 2023-08-14 PROCEDURE — 74018 RADEX ABDOMEN 1 VIEW: CPT | Mod: 26,,, | Performed by: UROLOGY

## 2023-08-14 PROCEDURE — 3044F PR MOST RECENT HEMOGLOBIN A1C LEVEL <7.0%: ICD-10-PCS | Mod: CPTII,S$GLB,, | Performed by: FAMILY MEDICINE

## 2023-08-14 PROCEDURE — 74018 XR ABDOMEN AP 1 VIEW: ICD-10-PCS | Mod: 26,,, | Performed by: UROLOGY

## 2023-08-14 PROCEDURE — 3075F SYST BP GE 130 - 139MM HG: CPT | Mod: CPTII,S$GLB,, | Performed by: FAMILY MEDICINE

## 2023-08-14 RX ORDER — CEPHALEXIN 500 MG/1
500 CAPSULE ORAL 2 TIMES DAILY
COMMUNITY
Start: 2023-08-10 | End: 2023-11-16

## 2023-08-14 NOTE — PROGRESS NOTES
Subjective:       Patient ID: Sari Fernandez is a 38 y.o. female.    Chief Complaint: Dysuria (Possible UTI was started on 500mg keflex last , thinks she could possibly have a stone too )      HPI: 38-year-old female presenting to the clinic with a complaint flank pain and dysuria.  She is currently on antibiotic for UTI.  Patient has an extensive history of kidney stones which required surgical intervention.         Past Medical History:   Past Medical History:   Diagnosis Date    ADHD     Anemia     Anxiety     Kidney stones     Urinary tract infection        Past Surgical Historical:   Past Surgical History:   Procedure Laterality Date     SECTION, LOW TRANSVERSE      KIDNEY STONE SURGERY      LITHOTRIPSY          Medications:   Medication List with Changes/Refills   Current Medications    CEPHALEXIN (KEFLEX) 500 MG CAPSULE    Take 500 mg by mouth 2 (two) times daily.        Past Social History:   Social History     Socioeconomic History    Marital status: Single   Tobacco Use    Smoking status: Never    Smokeless tobacco: Never   Substance and Sexual Activity    Alcohol use: Not Currently    Drug use: Never    Sexual activity: Not Currently     Partners: Male     Birth control/protection: None       Allergies: Review of patient's allergies indicates:  No Known Allergies     Family History:   Family History   Problem Relation Age of Onset    Prostate cancer Paternal Grandfather     No Known Problems Paternal Grandmother     No Known Problems Maternal Grandmother     No Known Problems Maternal Grandfather     Cancer Father     No Known Problems Mother         Review of Systems:  Review of Systems   Constitutional:  Negative for activity change, appetite change, chills, diaphoresis, fatigue, fever and unexpected weight change.   HENT:  Negative for congestion, dental problem, drooling, ear discharge, ear pain, facial swelling, hearing loss, mouth sores, nosebleeds, postnasal drip, rhinorrhea,  sinus pressure, sinus pain, sneezing, sore throat, tinnitus, trouble swallowing and voice change.    Eyes:  Negative for photophobia, pain, discharge, redness, itching and visual disturbance.   Respiratory:  Negative for apnea, cough, choking, chest tightness, shortness of breath, wheezing and stridor.    Cardiovascular:  Negative for chest pain and leg swelling.   Gastrointestinal:  Positive for abdominal pain. Negative for abdominal distention, anal bleeding, blood in stool, constipation, diarrhea, nausea, rectal pain and vomiting.   Endocrine: Negative for cold intolerance, heat intolerance, polydipsia, polyphagia and polyuria.   Genitourinary:  Positive for flank pain and frequency. Negative for decreased urine volume, difficulty urinating, dyspareunia, dysuria, enuresis, genital sores, hematuria, menstrual problem, pelvic pain, urgency, vaginal bleeding, vaginal discharge and vaginal pain.   Musculoskeletal:  Negative for arthralgias, back pain, gait problem, joint swelling, myalgias, neck pain and neck stiffness.   Skin:  Negative for color change, pallor, rash and wound.   Allergic/Immunologic: Negative for environmental allergies, food allergies and immunocompromised state.   Neurological:  Negative for dizziness, tremors, seizures, syncope, facial asymmetry, speech difficulty, weakness, light-headedness, numbness and headaches.   Hematological:  Negative for adenopathy. Does not bruise/bleed easily.   Psychiatric/Behavioral:  Negative for agitation, behavioral problems, confusion, decreased concentration, dysphoric mood, hallucinations, self-injury, sleep disturbance and suicidal ideas. The patient is not nervous/anxious and is not hyperactive.        Physical Exam:  Physical Exam  Exam conducted with a chaperone present.   Constitutional:       Appearance: Normal appearance.   HENT:      Head: Normocephalic.      Nose: Nose normal.      Mouth/Throat:      Mouth: Mucous membranes are moist.      Pharynx:  Oropharynx is clear.   Eyes:      Extraocular Movements: Extraocular movements intact.      Conjunctiva/sclera: Conjunctivae normal.      Pupils: Pupils are equal, round, and reactive to light.   Cardiovascular:      Rate and Rhythm: Normal rate and regular rhythm.      Pulses: Normal pulses.      Heart sounds: Normal heart sounds.   Pulmonary:      Effort: Pulmonary effort is normal.      Breath sounds: Normal breath sounds.   Abdominal:      General: Abdomen is flat. Bowel sounds are normal.      Palpations: Abdomen is soft.      Hernia: There is no hernia in the left inguinal area or right inguinal area.   Genitourinary:     General: Normal vulva.      Pubic Area: No rash or pubic lice.       Labia:         Right: No rash, tenderness, lesion or injury.         Left: No rash, tenderness, lesion or injury.       Urethra: No prolapse, urethral pain, urethral swelling or urethral lesion.      Rectum: Normal.   Musculoskeletal:         General: Normal range of motion.      Cervical back: Normal range of motion and neck supple.   Lymphadenopathy:      Lower Body: No right inguinal adenopathy. No left inguinal adenopathy.   Skin:     General: Skin is warm and dry.      Capillary Refill: Capillary refill takes less than 2 seconds.   Neurological:      General: No focal deficit present.      Mental Status: She is alert and oriented to person, place, and time. Mental status is at baseline.   Psychiatric:         Mood and Affect: Mood normal.         Behavior: Behavior normal.         Thought Content: Thought content normal.         Judgment: Judgment normal.         Assessment/Plan:     Flank pain:  Instructed patient to complete antibiotic prescribed previously.  We will get some updated imaging to rule out any calcifications.  Urinalysis negative for infection or hematuria today.  Increase fluid intake and follow up as needed.  We will notify her of CT results when they are received.  Problem List Items Addressed This  Visit    None  Visit Diagnoses       Kidney stone    -  Primary    Right flank pain        Relevant Orders    X-Ray Abdomen AP 1 View    CT Renal Stone Study ABD Pelvis WO    Dysuria        Relevant Orders    POCT Urinalysis(Instrument) (Completed)    X-Ray Abdomen AP 1 View

## 2023-08-27 ENCOUNTER — PATIENT MESSAGE (OUTPATIENT)
Dept: PRIMARY CARE CLINIC | Facility: CLINIC | Age: 38
End: 2023-08-27
Payer: MEDICAID

## 2023-09-08 ENCOUNTER — TELEPHONE (OUTPATIENT)
Dept: UROLOGY | Facility: CLINIC | Age: 38
End: 2023-09-08
Payer: MEDICAID

## 2023-09-08 NOTE — TELEPHONE ENCOUNTER
Spoke with pt and informed of CT results. Pt verbalized understanding.     ----- Message from Steffi Rg NP sent at 9/8/2023  1:29 PM CDT -----  Please notify patient of CT results.  There is no evidence of an obstructing ureteral calculus.

## 2023-09-09 ENCOUNTER — PATIENT MESSAGE (OUTPATIENT)
Dept: PRIMARY CARE CLINIC | Facility: CLINIC | Age: 38
End: 2023-09-09
Payer: MEDICAID

## 2023-11-16 ENCOUNTER — OFFICE VISIT (OUTPATIENT)
Dept: PRIMARY CARE CLINIC | Facility: CLINIC | Age: 38
End: 2023-11-16
Payer: MEDICAID

## 2023-11-16 VITALS
HEART RATE: 85 BPM | OXYGEN SATURATION: 99 % | SYSTOLIC BLOOD PRESSURE: 129 MMHG | WEIGHT: 155.5 LBS | HEIGHT: 64 IN | DIASTOLIC BLOOD PRESSURE: 85 MMHG | RESPIRATION RATE: 16 BRPM | BODY MASS INDEX: 26.55 KG/M2

## 2023-11-16 DIAGNOSIS — F41.9 ANXIETY: Primary | ICD-10-CM

## 2023-11-16 DIAGNOSIS — E03.8 OTHER SPECIFIED HYPOTHYROIDISM: ICD-10-CM

## 2023-11-16 PROCEDURE — 3079F PR MOST RECENT DIASTOLIC BLOOD PRESSURE 80-89 MM HG: ICD-10-PCS | Mod: CPTII,S$GLB,, | Performed by: NURSE PRACTITIONER

## 2023-11-16 PROCEDURE — 3044F PR MOST RECENT HEMOGLOBIN A1C LEVEL <7.0%: ICD-10-PCS | Mod: CPTII,S$GLB,, | Performed by: NURSE PRACTITIONER

## 2023-11-16 PROCEDURE — 3008F BODY MASS INDEX DOCD: CPT | Mod: CPTII,S$GLB,, | Performed by: NURSE PRACTITIONER

## 2023-11-16 PROCEDURE — 1160F PR REVIEW ALL MEDS BY PRESCRIBER/CLIN PHARMACIST DOCUMENTED: ICD-10-PCS | Mod: CPTII,S$GLB,, | Performed by: NURSE PRACTITIONER

## 2023-11-16 PROCEDURE — 3074F PR MOST RECENT SYSTOLIC BLOOD PRESSURE < 130 MM HG: ICD-10-PCS | Mod: CPTII,S$GLB,, | Performed by: NURSE PRACTITIONER

## 2023-11-16 PROCEDURE — 99214 OFFICE O/P EST MOD 30 MIN: CPT | Mod: S$GLB,,, | Performed by: NURSE PRACTITIONER

## 2023-11-16 PROCEDURE — 3008F PR BODY MASS INDEX (BMI) DOCUMENTED: ICD-10-PCS | Mod: CPTII,S$GLB,, | Performed by: NURSE PRACTITIONER

## 2023-11-16 PROCEDURE — 3044F HG A1C LEVEL LT 7.0%: CPT | Mod: CPTII,S$GLB,, | Performed by: NURSE PRACTITIONER

## 2023-11-16 PROCEDURE — 3074F SYST BP LT 130 MM HG: CPT | Mod: CPTII,S$GLB,, | Performed by: NURSE PRACTITIONER

## 2023-11-16 PROCEDURE — 1160F RVW MEDS BY RX/DR IN RCRD: CPT | Mod: CPTII,S$GLB,, | Performed by: NURSE PRACTITIONER

## 2023-11-16 PROCEDURE — 99214 PR OFFICE/OUTPT VISIT, EST, LEVL IV, 30-39 MIN: ICD-10-PCS | Mod: S$GLB,,, | Performed by: NURSE PRACTITIONER

## 2023-11-16 PROCEDURE — 3079F DIAST BP 80-89 MM HG: CPT | Mod: CPTII,S$GLB,, | Performed by: NURSE PRACTITIONER

## 2023-11-16 PROCEDURE — 1159F PR MEDICATION LIST DOCUMENTED IN MEDICAL RECORD: ICD-10-PCS | Mod: CPTII,S$GLB,, | Performed by: NURSE PRACTITIONER

## 2023-11-16 PROCEDURE — 1159F MED LIST DOCD IN RCRD: CPT | Mod: CPTII,S$GLB,, | Performed by: NURSE PRACTITIONER

## 2023-11-16 RX ORDER — CITALOPRAM 10 MG/1
10 TABLET ORAL DAILY
Qty: 30 TABLET | Refills: 2 | Status: SHIPPED | OUTPATIENT
Start: 2023-11-16 | End: 2024-01-19

## 2023-11-16 RX ORDER — ALPRAZOLAM 0.25 MG/1
0.25 TABLET ORAL 2 TIMES DAILY PRN
Qty: 60 TABLET | Refills: 0 | Status: SHIPPED | OUTPATIENT
Start: 2023-11-16 | End: 2024-01-19 | Stop reason: SDUPTHER

## 2023-11-16 NOTE — PATIENT INSTRUCTIONS
RTC in 2 months for F/U or sooner if needed.    Patient Education       Anxiety Discharge Instructions, Adult   About this topic   Anxiety can cause you to feel very worried. It can also cause physical symptoms like chest pain, stomach aches, or trouble sleeping. While mild anxiety is a normal response to stress, it can cause you problems in your everyday life. You may need follow-up care to help manage your anxiety. Anxiety happens in many forms, like:  Being scared all the time that something bad is going to happen. This is generalized anxiety.  Strong bursts of fear where your body has signs that may feel like a heart attack. This is called a panic attack.  Upsetting thoughts that happen often. There is a need to repeat doing certain things to help get rid of the anxiety caused by these thoughts. The thoughts or actions may be about checking on things, touching things, or worry about germs. This is an obsessive-compulsive disorder.  Strong fear of an object, place, or condition. This is a phobia.  Fear that others think bad things about you or being put down by other people. This is social anxiety.  Nightmares, flashbacks, staying away from people, or having panic attacks when reminded of a shocking or hurtful time or place from the past. This is post-traumatic stress.  Anxiety disorder may be treated in many ways. Some kinds of treatment have you talk about your beliefs, fears, and worries. You may learn how certain thoughts or feelings can raise anxiety. You may also learn what steps to take to lower anxiety. Other kinds of treatment may have you look back on a hurtful event, sad memory, or something you are afraid of. The doctor will help you deal with the feelings that you may have. You may learn ways to cope with unwanted events or thoughts by looking at your fears in a way that feels safe.  What care is needed at home?   Ask your doctor what you need to do when you go home. Make sure you ask questions if  you do not understand what the doctor says.  Set a time to talk with a counselor about your worries and feelings. This can help you with your anxiety.  Take care to follow all instructions when you take your medicines.  Limit alcohol and caffeine.  Learn ways to manage stress. Relaxation methods like reflection, deep breathing, and muscle relaxation may be helpful. Things like yoga, exercise, and maya chi are also good.  Talk about your feelings with family members and friends you trust. Talk to someone who can help you see how your thoughts at certain times may raise your anxiety.     What follow-up care is needed?   Your doctor may ask you to make visits to the office to check on your progress. Be sure to keep these visits.  What drugs may be needed?   The doctor may order drugs to help the physical signs of anxiety. Make sure that you take the drugs as taught to you by the doctor. Talk with your doctor about any side effects and ask how long you should take the drug.  Will physical activity be limited?   You may take part in physical activities. Some people are limited because of their anxiety or fear. Talk with your doctor about the right amount of activity for you.  What changes to diet are needed?   Eat a variety of healthy foods and limit drinks with caffeine. You should avoid alcohol, energy drinks, and over-the-counter stimulants.  What problems could happen?   If your anxiety is not treated, it can result in:  Staying away from work or social events  Not being able to do everyday tasks  Keeping away from family and friends  What can be done to prevent this health problem?   Learn what events, people, or things upset you. Limit your contact with them.  Talk about your feelings. Talk to someone who can help you see how your thoughts at certain times may raise your anxiety.  Seek support from your friends and family. Find someone who calms you down. Ask if you can call them when you are getting anxious.  When  do I need to call the doctor?   You feel you may harm yourself or someone else.  You can also call a mental health hotline for help.  You have any physical symptoms, such as chest pain, trouble breathing, or severe belly pain, that could be a sign of a serious problem.  If you are short of breath.  If you do not feel like you can be alone.  Teach Back: Helping You Understand   The Teach Back Method helps you understand the information we are giving you. After you talk with the staff, tell them in your own words what you learned. This helps to make sure the staff has described each thing clearly. It also helps to explain things that may have been confusing. Before going home, make sure you can do these:  I can tell you about my condition and the drugs I need to take.  I can tell you what may help lower my anxiety.  I can tell you what I will do if it is hard to breathe or I have chest pain.  I can tell you what I will do if I do not feel safe or cannot be alone.  Where can I learn more?   [facility]  https://www.chrissy.org/Learn-More/Mental-Health-Conditions/Anxiety-Disorders   National Health Service  https://www.nhs.uk/conditions/generalised-anxiety-disorder/symptoms/   [employer] [place] of Health ? [facility]  https://www.renny.nih.gov/health/relieving-stress-anxiety-qwlxhmcdh-mvygpfygur-pjjpolttsw   National [place] of Mental Health  http://www.nimh.nih.gov/health/publications/anxiety-disorders/complete-index.shtml   Last Reviewed Date   2021-06-08  Consumer Information Use and Disclaimer   This information is not specific medical advice and does not replace information you receive from your health care provider. This is only a brief summary of general information. It does NOT include all information about conditions, illnesses, injuries, tests, procedures, treatments, therapies, discharge instructions or life-style choices that may apply to you. You must talk with your health care provider for complete information  about your health and treatment options. This information should not be used to decide whether or not to accept your health care providers advice, instructions or recommendations. Only your health care provider has the knowledge and training to provide advice that is right for you.  Copyright   Copyright © 2021 [facility] and its affiliates and/or licensors. All rights reserved.

## 2023-11-16 NOTE — PROGRESS NOTES
Subjective:       Patient ID: Sari Fernandez is a 38 y.o. female.    Chief Complaint: Follow-up    HPI: Sari is a 38 y.o. female who presents for F/U.    Suffering with increased anxiety which is affecting her work and home life. Denies being depressed.    Annual labs were drawn on 05/15/2023 and all results are normal.    History of Hypothyroidism but she refuses medication at this time. TSH increased slightly to 3.96 on recent labs from 3.56 last August and T4 Free is normal. Her mom also has thyroid disease. She was prescribed levothyroxine 25 mcg but did not take the medication to see if her level would come down on its own and it did.     Followed by Dr. Larios for GYN. PAP UTD.    Denies drinking or smoking.    Refuses FLU and COVID vaccines.                      Past Medical History:   Diagnosis Date    ADHD     Anemia     Anxiety     Kidney stones     Urinary tract infection        Past Surgical History:   Procedure Laterality Date     SECTION, LOW TRANSVERSE      KIDNEY STONE SURGERY      LITHOTRIPSY         Family History   Problem Relation Age of Onset    Prostate cancer Paternal Grandfather     No Known Problems Paternal Grandmother     No Known Problems Maternal Grandmother     No Known Problems Maternal Grandfather     Cancer Father     No Known Problems Mother        Social History     Tobacco Use    Smoking status: Never    Smokeless tobacco: Never   Substance Use Topics    Alcohol use: Not Currently    Drug use: Never       Patient Active Problem List   Diagnosis    Adult attention deficit hyperactivity disorder    Anxiety disorder    Attention deficit hyperactivity disorder, predominantly inattentive type    Bacteremia    Fatigue    Hypertension affecting pregnancy    Hypokalemia    Iron deficiency anemia    Pregnancy    Screening for malignant neoplasm of cervix    Status post  delivery    Tobacco user    Urinary tract infection in mother during pregnancy    Status post  " section    UTI (urinary tract infection) during pregnancy    Other specified hypothyroidism       Immunization History   Administered Date(s) Administered    DTaP 1987, 1988, 1988, 1989, 1992    HIB 1999    Hepatitis B, Pediatric/Adolescent 1998, 1998, 1999    IPV 1987, 1988, 1988, 1989, 1992    MMR 1988, 1992    Tdap 1998, 2008, 2021, 2021           Review of Systems   Constitutional:  Negative for activity change, appetite change, chills, diaphoresis, fatigue and fever.   HENT:  Negative for tinnitus and trouble swallowing.    Eyes:  Negative for visual disturbance.   Respiratory:  Negative for cough, chest tightness, shortness of breath and wheezing.    Cardiovascular:  Negative for chest pain, palpitations and leg swelling.   Gastrointestinal:  Negative for abdominal distention, abdominal pain, blood in stool, constipation, diarrhea, nausea and vomiting.   Endocrine: Negative for cold intolerance and heat intolerance.   Genitourinary:  Negative for decreased urine volume, dysuria, frequency, hematuria and urgency.   Musculoskeletal:  Negative for gait problem, neck pain and neck stiffness.   Skin:  Negative for color change and rash.   Neurological:  Negative for dizziness, weakness, light-headedness, numbness and headaches.   Psychiatric/Behavioral:  Positive for sleep disturbance. Negative for behavioral problems, confusion, dysphoric mood, self-injury and suicidal ideas. The patient is nervous/anxious.      Objective:     Vitals:    23 1427   BP: 129/85   BP Location: Right arm   Patient Position: Sitting   BP Method: Medium (Automatic)   Pulse: 85   Resp: 16   SpO2: 99%   Weight: 70.5 kg (155 lb 8 oz)   Height: 5' 4" (1.626 m)       Physical Exam  Vitals and nursing note reviewed.   Constitutional:       General: She is not in acute distress.     Appearance: Normal " appearance. She is not diaphoretic.   HENT:      Head: Normocephalic and atraumatic.      Nose: Nose normal.      Mouth/Throat:      Mouth: Mucous membranes are moist.      Pharynx: Oropharynx is clear.   Eyes:      Conjunctiva/sclera: Conjunctivae normal.   Neck:      Thyroid: No thyromegaly or thyroid tenderness.   Cardiovascular:      Rate and Rhythm: Normal rate and regular rhythm.   Pulmonary:      Effort: Pulmonary effort is normal.      Breath sounds: Normal breath sounds. No stridor. No wheezing or rales.   Abdominal:      General: There is no distension.      Tenderness: There is no abdominal tenderness.   Musculoskeletal:         General: No tenderness. Normal range of motion.      Cervical back: Normal range of motion and neck supple.      Right lower leg: No edema.      Left lower leg: No edema.   Lymphadenopathy:      Cervical: No cervical adenopathy.   Skin:     General: Skin is warm and dry.   Neurological:      Mental Status: She is alert and oriented to person, place, and time.   Psychiatric:         Mood and Affect: Affect normal. Mood is anxious. Mood is not depressed.         Behavior: Behavior normal. Behavior is cooperative.         Thought Content: Thought content normal.         Judgment: Judgment normal.         Office Visit on 08/14/2023   Component Date Value Ref Range Status    Color, POC UA 08/14/2023 Yellow  Yellow, Straw, Colorless Final    Clarity, POC UA 08/14/2023 Clear  Clear Final    Glucose, POC UA 08/14/2023 Negative  Negative Final    Bilirubin, POC UA 08/14/2023 Negative  Negative Final    Ketones, POC UA 08/14/2023 Negative  Negative Final    Spec Grav POC UA 08/14/2023 1.020  1.005 - 1.030 Final    Blood, POC UA 08/14/2023 Negative  Negative Final    pH, POC UA 08/14/2023 6.0  5.0 - 8.0 Final    Protein, POC UA 08/14/2023 Negative  Negative Final    Urobilinogen, POC UA 08/14/2023 0.2  <=1.0 Final    Nitrite, POC UA 08/14/2023 Negative  Negative Final    WBC, POC UA  08/14/2023 Negative  Negative Final   Office Visit on 07/03/2023   Component Date Value Ref Range Status    Disclaimer 07/03/2023    Final                    Value:DISCLAIMER: Annual Pap smears are the best means now available for the early detection of cervical cancer.  There is no guarantee, however, that a particular Pap smear will  diseased cells that are present.  Thus, false negative reports may   occasionally occur.     Patient Outreach on 06/01/2023   Component Date Value Ref Range Status    Glucose 05/15/2023 89  74 - 106 mg/dL Final    BUN 05/15/2023 13.1  6 - 20 mg/dL Final    Creatinine 05/15/2023 0.9  0.5 - 0.9 mg/dL Final    AST 05/15/2023 15  0 - 32 U/L Final    ALT 05/15/2023 12  0 - 33 U/L Final    Alkaline Phosphatase 05/15/2023 101  35 - 105 U/L Final    Calcium 05/15/2023 9.4  8.6 - 10.2 mg/dL Final    Protein, Total 05/15/2023 7.2  6.4 - 8.3 g/dL Final    Albumin 05/15/2023 4.6  3.5 - 5.2 g/dL Final    Total Bilirubin 05/15/2023 0.5  0.0 - 1.2 mg/dL Final    Sodium 05/15/2023 138  136 - 145 mmol/L Final    Potassium 05/15/2023 4.2  3.5 - 5.1 mmol/L Final    Chloride 05/15/2023 103  98 - 107 mmol/L Final    CO2 05/15/2023 26  22 - 29 mmol/L Final    Globulin 05/15/2023 2.6  1.5 - 4.5 g/dL Final    Albumin/Globulin Ratio 05/15/2023 1.8  1.0 - 2.7 Final    BUN/CREAT RATIO 05/15/2023 15.2  6 - 20 Final    GFR ESTIMATION 05/15/2023 90.29  >60.00 Final    Anion Gap 05/15/2023 9  8 - 17 mmol/L Final    Hemoglobin A1C 05/15/2023 4.8  4.0 - 6.0 % Final    Cholesterol 05/15/2023 174  100 - 200 mg/dL Final    Triglycerides 05/15/2023 59  0 - 150 mg/dL Final    HDL 05/15/2023 64  >60 mg/dL Final    LDL Cholesterol 05/15/2023 98  0 - 100 mg/dL Final    LDL/HDL Ratio 05/15/2023 1.5  1.0 - 3.0 Final         Assessment:      1. Anxiety    2. Other specified hypothyroidism          Plan:     Anxiety  Comments:  Start celexa daily and xanax as needed, F/U in 2 months  Orders:  -     citalopram (CELEXA) 10  MG tablet; Take 1 tablet (10 mg total) by mouth once daily.  Dispense: 30 tablet; Refill: 2  -     ALPRAZolam (XANAX) 0.25 MG tablet; Take 1 tablet (0.25 mg total) by mouth 2 (two) times daily as needed for Anxiety.  Dispense: 60 tablet; Refill: 0    Other specified hypothyroidism  Comments:  stable, continue to monitor         Current Outpatient Medications   Medication Sig Dispense Refill    ALPRAZolam (XANAX) 0.25 MG tablet Take 1 tablet (0.25 mg total) by mouth 2 (two) times daily as needed for Anxiety. 60 tablet 0    citalopram (CELEXA) 10 MG tablet Take 1 tablet (10 mg total) by mouth once daily. 30 tablet 2     No current facility-administered medications for this visit.       Medications Discontinued During This Encounter   Medication Reason    cephALEXin (KEFLEX) 500 MG capsule Patient no longer taking       Health Maintenance   Topic Date Due    TETANUS VACCINE  06/28/2031    Hepatitis C Screening  Completed    Lipid Panel  Completed       Patient Instructions   RTC in 2 months for F/U or sooner if needed.    Patient Education       Anxiety Discharge Instructions, Adult   About this topic   Anxiety can cause you to feel very worried. It can also cause physical symptoms like chest pain, stomach aches, or trouble sleeping. While mild anxiety is a normal response to stress, it can cause you problems in your everyday life. You may need follow-up care to help manage your anxiety. Anxiety happens in many forms, like:  Being scared all the time that something bad is going to happen. This is generalized anxiety.  Strong bursts of fear where your body has signs that may feel like a heart attack. This is called a panic attack.  Upsetting thoughts that happen often. There is a need to repeat doing certain things to help get rid of the anxiety caused by these thoughts. The thoughts or actions may be about checking on things, touching things, or worry about germs. This is an obsessive-compulsive disorder.  Strong fear  of an object, place, or condition. This is a phobia.  Fear that others think bad things about you or being put down by other people. This is social anxiety.  Nightmares, flashbacks, staying away from people, or having panic attacks when reminded of a shocking or hurtful time or place from the past. This is post-traumatic stress.  Anxiety disorder may be treated in many ways. Some kinds of treatment have you talk about your beliefs, fears, and worries. You may learn how certain thoughts or feelings can raise anxiety. You may also learn what steps to take to lower anxiety. Other kinds of treatment may have you look back on a hurtful event, sad memory, or something you are afraid of. The doctor will help you deal with the feelings that you may have. You may learn ways to cope with unwanted events or thoughts by looking at your fears in a way that feels safe.  What care is needed at home?   Ask your doctor what you need to do when you go home. Make sure you ask questions if you do not understand what the doctor says.  Set a time to talk with a counselor about your worries and feelings. This can help you with your anxiety.  Take care to follow all instructions when you take your medicines.  Limit alcohol and caffeine.  Learn ways to manage stress. Relaxation methods like reflection, deep breathing, and muscle relaxation may be helpful. Things like yoga, exercise, and maya chi are also good.  Talk about your feelings with family members and friends you trust. Talk to someone who can help you see how your thoughts at certain times may raise your anxiety.     What follow-up care is needed?   Your doctor may ask you to make visits to the office to check on your progress. Be sure to keep these visits.  What drugs may be needed?   The doctor may order drugs to help the physical signs of anxiety. Make sure that you take the drugs as taught to you by the doctor. Talk with your doctor about any side effects and ask how long you  should take the drug.  Will physical activity be limited?   You may take part in physical activities. Some people are limited because of their anxiety or fear. Talk with your doctor about the right amount of activity for you.  What changes to diet are needed?   Eat a variety of healthy foods and limit drinks with caffeine. You should avoid alcohol, energy drinks, and over-the-counter stimulants.  What problems could happen?   If your anxiety is not treated, it can result in:  Staying away from work or social events  Not being able to do everyday tasks  Keeping away from family and friends  What can be done to prevent this health problem?   Learn what events, people, or things upset you. Limit your contact with them.  Talk about your feelings. Talk to someone who can help you see how your thoughts at certain times may raise your anxiety.  Seek support from your friends and family. Find someone who calms you down. Ask if you can call them when you are getting anxious.  When do I need to call the doctor?   You feel you may harm yourself or someone else.  You can also call a mental health hotline for help.  You have any physical symptoms, such as chest pain, trouble breathing, or severe belly pain, that could be a sign of a serious problem.  If you are short of breath.  If you do not feel like you can be alone.  Teach Back: Helping You Understand   The Teach Back Method helps you understand the information we are giving you. After you talk with the staff, tell them in your own words what you learned. This helps to make sure the staff has described each thing clearly. It also helps to explain things that may have been confusing. Before going home, make sure you can do these:  I can tell you about my condition and the drugs I need to take.  I can tell you what may help lower my anxiety.  I can tell you what I will do if it is hard to breathe or I have chest pain.  I can tell you what I will do if I do not feel safe or  cannot be alone.  Where can I learn more?   [facility]  https://www.chrissy.org/Learn-More/Mental-Health-Conditions/Anxiety-Disorders   National Health Service  https://www.nhs.uk/conditions/generalised-anxiety-disorder/symptoms/   [employer] [place] of Health ? Senior Health  https://www.renny.nih.gov/health/relieving-stress-anxiety-xkoomwaqj-xyxmsbtobn-akyiuqmvzz   [place] of Mental Health  http://www.[facility].nih.gov/health/publications/anxiety-disorders/complete-index.shtml   Last Reviewed Date   2021-06-08  Consumer Information Use and Disclaimer   This information is not specific medical advice and does not replace information you receive from your health care provider. This is only a brief summary of general information. It does NOT include all information about conditions, illnesses, injuries, tests, procedures, treatments, therapies, discharge instructions or life-style choices that may apply to you. You must talk with your health care provider for complete information about your health and treatment options. This information should not be used to decide whether or not to accept your health care providers advice, instructions or recommendations. Only your health care provider has the knowledge and training to provide advice that is right for you.  Copyright   Copyright © 2021 [employer] and its affiliates and/or licensors. All rights reserved.          Risks, benefits, and alternatives discussed with patient, Patient verbalized understanding of discussed plan of care. Asked patient if any further questions, answered no.    Future Appointments   Date Time Provider Department Center   1/19/2024 10:00 AM Antonina Campbell NP [id number]  Shai Campbell NP

## 2023-12-24 ENCOUNTER — PATIENT MESSAGE (OUTPATIENT)
Dept: PRIMARY CARE CLINIC | Facility: CLINIC | Age: 38
End: 2023-12-24
Payer: MEDICAID

## 2023-12-24 DIAGNOSIS — K21.00 GASTROESOPHAGEAL REFLUX DISEASE WITH ESOPHAGITIS WITHOUT HEMORRHAGE: Primary | ICD-10-CM

## 2023-12-27 RX ORDER — PANTOPRAZOLE SODIUM 20 MG/1
20 TABLET, DELAYED RELEASE ORAL DAILY
Qty: 30 TABLET | Refills: 2 | Status: SHIPPED | OUTPATIENT
Start: 2023-12-27 | End: 2024-01-19

## 2023-12-29 ENCOUNTER — TELEPHONE (OUTPATIENT)
Dept: UROLOGY | Facility: CLINIC | Age: 38
End: 2023-12-29
Payer: MEDICAID

## 2024-01-19 ENCOUNTER — PATIENT MESSAGE (OUTPATIENT)
Dept: PRIMARY CARE CLINIC | Facility: CLINIC | Age: 39
End: 2024-01-19

## 2024-01-19 ENCOUNTER — TELEPHONE (OUTPATIENT)
Dept: PRIMARY CARE CLINIC | Facility: CLINIC | Age: 39
End: 2024-01-19

## 2024-01-19 ENCOUNTER — OFFICE VISIT (OUTPATIENT)
Dept: PRIMARY CARE CLINIC | Facility: CLINIC | Age: 39
End: 2024-01-19
Payer: MEDICAID

## 2024-01-19 VITALS
DIASTOLIC BLOOD PRESSURE: 76 MMHG | HEIGHT: 64 IN | SYSTOLIC BLOOD PRESSURE: 108 MMHG | HEART RATE: 66 BPM | OXYGEN SATURATION: 99 % | WEIGHT: 151.19 LBS | BODY MASS INDEX: 25.81 KG/M2 | TEMPERATURE: 98 F | RESPIRATION RATE: 16 BRPM

## 2024-01-19 DIAGNOSIS — E03.8 OTHER SPECIFIED HYPOTHYROIDISM: ICD-10-CM

## 2024-01-19 DIAGNOSIS — Z00.00 ANNUAL PHYSICAL EXAM: ICD-10-CM

## 2024-01-19 DIAGNOSIS — R35.0 URINE FREQUENCY: ICD-10-CM

## 2024-01-19 DIAGNOSIS — E55.9 VITAMIN D DEFICIENCY: ICD-10-CM

## 2024-01-19 DIAGNOSIS — Z13.220 SCREENING CHOLESTEROL LEVEL: ICD-10-CM

## 2024-01-19 DIAGNOSIS — F41.9 ANXIETY: Primary | ICD-10-CM

## 2024-01-19 DIAGNOSIS — Z13.1 DIABETES MELLITUS SCREENING: ICD-10-CM

## 2024-01-19 PROCEDURE — 1159F MED LIST DOCD IN RCRD: CPT | Mod: CPTII,S$GLB,, | Performed by: NURSE PRACTITIONER

## 2024-01-19 PROCEDURE — 3078F DIAST BP <80 MM HG: CPT | Mod: CPTII,S$GLB,, | Performed by: NURSE PRACTITIONER

## 2024-01-19 PROCEDURE — 1160F RVW MEDS BY RX/DR IN RCRD: CPT | Mod: CPTII,S$GLB,, | Performed by: NURSE PRACTITIONER

## 2024-01-19 PROCEDURE — 99214 OFFICE O/P EST MOD 30 MIN: CPT | Mod: S$GLB,,, | Performed by: NURSE PRACTITIONER

## 2024-01-19 PROCEDURE — 3008F BODY MASS INDEX DOCD: CPT | Mod: CPTII,S$GLB,, | Performed by: NURSE PRACTITIONER

## 2024-01-19 PROCEDURE — 3074F SYST BP LT 130 MM HG: CPT | Mod: CPTII,S$GLB,, | Performed by: NURSE PRACTITIONER

## 2024-01-19 RX ORDER — ALPRAZOLAM 0.25 MG/1
0.25 TABLET ORAL 2 TIMES DAILY PRN
Qty: 60 TABLET | Refills: 1 | Status: SHIPPED | OUTPATIENT
Start: 2024-01-19 | End: 2024-04-26 | Stop reason: SDUPTHER

## 2024-01-19 NOTE — PROGRESS NOTES
Subjective:       Patient ID: Sari Fernandez is a 38 y.o. female.    Chief Complaint: Back Pain (C/o Lt sided back/flank pain)    HPI: Sari is a 38 y.o. female who presents for F/U.    Feels well today with no new issues or concerns.    History of anxiety which is a chronic issue and was affecting her work and home life so she was started on celexa daily and xanax as needed and it is working well. Denies being depressed.    History of Hypothyroidism but she refuses medication at this time. TSH increased slightly to 3.96 on recent labs from 3.56 last August and T4 Free is normal. Her mom also has thyroid disease. She was prescribed levothyroxine 25 mcg but did not take the medication to see if her level would come down on its own and it did.     Followed by Dr. Larios for GYN. PAP UTD.    Denies drinking or smoking.    Refuses FLU and COVID vaccines.                      Past Medical History:   Diagnosis Date    ADHD     Anemia     Anxiety     Kidney stones     Urinary tract infection        Past Surgical History:   Procedure Laterality Date     SECTION, LOW TRANSVERSE      KIDNEY STONE SURGERY      LITHOTRIPSY         Family History   Problem Relation Age of Onset    Prostate cancer Paternal Grandfather     No Known Problems Paternal Grandmother     No Known Problems Maternal Grandmother     No Known Problems Maternal Grandfather     Cancer Father     No Known Problems Mother        Social History     Tobacco Use    Smoking status: Never    Smokeless tobacco: Never   Substance Use Topics    Alcohol use: Not Currently    Drug use: Never       Patient Active Problem List   Diagnosis    Adult attention deficit hyperactivity disorder    Anxiety disorder    Attention deficit hyperactivity disorder, predominantly inattentive type    Bacteremia    Fatigue    Hypertension affecting pregnancy    Hypokalemia    Iron deficiency anemia    Pregnancy    Screening for malignant neoplasm of cervix    Status post  " delivery    Tobacco user    Urinary tract infection in mother during pregnancy    Status post  section    UTI (urinary tract infection) during pregnancy    Other specified hypothyroidism       Immunization History   Administered Date(s) Administered    DTaP 1987, 1988, 1988, 1989, 1992    HIB 1999    Hepatitis B, Pediatric/Adolescent 1998, 1998, 1999    IPV 1987, 1988, 1988, 1989, 1992    MMR 1988, 1992    Tdap 1998, 2008, 2021, 2021           Review of Systems   Constitutional:  Negative for activity change, appetite change, chills, diaphoresis, fatigue and fever.   HENT:  Negative for tinnitus and trouble swallowing.    Eyes:  Negative for visual disturbance.   Respiratory:  Negative for cough, chest tightness, shortness of breath and wheezing.    Cardiovascular:  Negative for chest pain, palpitations and leg swelling.   Gastrointestinal:  Negative for abdominal distention, abdominal pain, blood in stool, constipation, diarrhea, nausea and vomiting.   Genitourinary:  Negative for decreased urine volume, dysuria, frequency, hematuria and urgency.   Musculoskeletal:  Negative for gait problem.   Skin:  Negative for color change and rash.   Neurological:  Negative for dizziness, syncope, weakness, light-headedness, numbness and headaches.   Psychiatric/Behavioral:  Negative for behavioral problems, confusion and dysphoric mood. The patient is not nervous/anxious.      Objective:     Vitals:    24 0957   BP: 108/76   BP Location: Right arm   Patient Position: Sitting   BP Method: Medium (Automatic)   Pulse: 66   Resp: 16   Temp: 97.8 °F (36.6 °C)   TempSrc: Temporal   SpO2: 99%   Weight: 68.6 kg (151 lb 3.2 oz)   Height: 5' 4" (1.626 m)       Physical Exam  Vitals and nursing note reviewed.   Constitutional:       General: She is not in acute distress.     Appearance: Normal " appearance. She is not diaphoretic.   HENT:      Head: Normocephalic and atraumatic.      Mouth/Throat:      Mouth: Mucous membranes are moist.      Pharynx: Oropharynx is clear.   Eyes:      Extraocular Movements: Extraocular movements intact.      Conjunctiva/sclera: Conjunctivae normal.   Neck:      Thyroid: No thyromegaly or thyroid tenderness.   Cardiovascular:      Rate and Rhythm: Normal rate and regular rhythm.   Pulmonary:      Effort: Pulmonary effort is normal.      Breath sounds: Normal breath sounds. No stridor. No wheezing or rales.   Abdominal:      General: There is no distension.      Tenderness: There is no abdominal tenderness.   Musculoskeletal:         General: Normal range of motion.      Cervical back: Normal range of motion.   Lymphadenopathy:      Cervical: No cervical adenopathy.   Skin:     General: Skin is warm and dry.   Neurological:      Mental Status: She is alert and oriented to person, place, and time.   Psychiatric:         Mood and Affect: Mood and affect normal.         Behavior: Behavior normal. Behavior is cooperative.         Thought Content: Thought content normal.         Judgment: Judgment normal.         Office Visit on 08/14/2023   Component Date Value Ref Range Status    Color, POC UA 08/14/2023 Yellow  Yellow, Straw, Colorless Final    Clarity, POC UA 08/14/2023 Clear  Clear Final    Glucose, POC UA 08/14/2023 Negative  Negative Final    Bilirubin, POC UA 08/14/2023 Negative  Negative Final    Ketones, POC UA 08/14/2023 Negative  Negative Final    Spec Grav POC UA 08/14/2023 1.020  1.005 - 1.030 Final    Blood, POC UA 08/14/2023 Negative  Negative Final    pH, POC UA 08/14/2023 6.0  5.0 - 8.0 Final    Protein, POC UA 08/14/2023 Negative  Negative Final    Urobilinogen, POC UA 08/14/2023 0.2  <=1.0 Final    Nitrite, POC UA 08/14/2023 Negative  Negative Final    WBC, POC UA 08/14/2023 Negative  Negative Final         Assessment:      1. Anxiety          Plan:      Anxiety  Comments:  Start celexa daily and xanax as needed, F/U in 2 months         Current Outpatient Medications   Medication Sig Dispense Refill    ALPRAZolam (XANAX) 0.25 MG tablet Take 1 tablet (0.25 mg total) by mouth 2 (two) times daily as needed for Anxiety. 60 tablet 0    citalopram (CELEXA) 10 MG tablet Take 1 tablet (10 mg total) by mouth once daily. 30 tablet 2    pantoprazole (PROTONIX) 20 MG tablet Take 1 tablet (20 mg total) by mouth once daily. 30 tablet 2     No current facility-administered medications for this visit.       There are no discontinued medications.    Health Maintenance   Topic Date Due    TETANUS VACCINE  06/28/2031    Hepatitis C Screening  Completed    Lipid Panel  Completed       There are no Patient Instructions on file for this visit.    Risks, benefits, and alternatives discussed with patient, Patient verbalized understanding of discussed plan of care. Asked patient if any further questions, answered no.    No future appointments.           Silviano Campbell NP

## 2024-01-19 NOTE — PATIENT INSTRUCTIONS
RTC in 3 months for F/U or sooner if needed.    Patient Education       Anxiety Discharge Instructions, Adult   About this topic   Anxiety can cause you to feel very worried. It can also cause physical symptoms like chest pain, stomach aches, or trouble sleeping. While mild anxiety is a normal response to stress, it can cause you problems in your everyday life. You may need follow-up care to help manage your anxiety. Anxiety happens in many forms, like:  Being scared all the time that something bad is going to happen. This is generalized anxiety.  Strong bursts of fear where your body has signs that may feel like a heart attack. This is called a panic attack.  Upsetting thoughts that happen often. There is a need to repeat doing certain things to help get rid of the anxiety caused by these thoughts. The thoughts or actions may be about checking on things, touching things, or worry about germs. This is an obsessive-compulsive disorder.  Strong fear of an object, place, or condition. This is a phobia.  Fear that others think bad things about you or being put down by other people. This is social anxiety.  Nightmares, flashbacks, staying away from people, or having panic attacks when reminded of a shocking or hurtful time or place from the past. This is post-traumatic stress.  Anxiety disorder may be treated in many ways. Some kinds of treatment have you talk about your beliefs, fears, and worries. You may learn how certain thoughts or feelings can raise anxiety. You may also learn what steps to take to lower anxiety. Other kinds of treatment may have you look back on a hurtful event, sad memory, or something you are afraid of. The doctor will help you deal with the feelings that you may have. You may learn ways to cope with unwanted events or thoughts by looking at your fears in a way that feels safe.  What care is needed at home?   Ask your doctor what you need to do when you go home. Make sure you ask questions if  you do not understand what the doctor says.  Set a time to talk with a counselor about your worries and feelings. This can help you with your anxiety.  Take care to follow all instructions when you take your medicines.  Limit alcohol and caffeine.  Learn ways to manage stress. Relaxation methods like reflection, deep breathing, and muscle relaxation may be helpful. Things like yoga, exercise, and maya chi are also good.  Talk about your feelings with family members and friends you trust. Talk to someone who can help you see how your thoughts at certain times may raise your anxiety.     What follow-up care is needed?   Your doctor may ask you to make visits to the office to check on your progress. Be sure to keep these visits.  What drugs may be needed?   The doctor may order drugs to help the physical signs of anxiety. Make sure that you take the drugs as taught to you by the doctor. Talk with your doctor about any side effects and ask how long you should take the drug.  Will physical activity be limited?   You may take part in physical activities. Some people are limited because of their anxiety or fear. Talk with your doctor about the right amount of activity for you.  What changes to diet are needed?   Eat a variety of healthy foods and limit drinks with caffeine. You should avoid alcohol, energy drinks, and over-the-counter stimulants.  What problems could happen?   If your anxiety is not treated, it can result in:  Staying away from work or social events  Not being able to do everyday tasks  Keeping away from family and friends  What can be done to prevent this health problem?   Learn what events, people, or things upset you. Limit your contact with them.  Talk about your feelings. Talk to someone who can help you see how your thoughts at certain times may raise your anxiety.  Seek support from your friends and family. Find someone who calms you down. Ask if you can call them when you are getting anxious.  When  do I need to call the doctor?   You feel you may harm yourself or someone else.  You can also call a mental health hotline for help.  You have any physical symptoms, such as chest pain, trouble breathing, or severe belly pain, that could be a sign of a serious problem.  If you are short of breath.  If you do not feel like you can be alone.  Teach Back: Helping You Understand   The Teach Back Method helps you understand the information we are giving you. After you talk with the staff, tell them in your own words what you learned. This helps to make sure the staff has described each thing clearly. It also helps to explain things that may have been confusing. Before going home, make sure you can do these:  I can tell you about my condition and the drugs I need to take.  I can tell you what may help lower my anxiety.  I can tell you what I will do if it is hard to breathe or I have chest pain.  I can tell you what I will do if I do not feel safe or cannot be alone.  Where can I learn more?   [facility]  https://www.chrissy.org/Learn-More/Mental-Health-Conditions/Anxiety-Disorders   National [facility]  https://www.nhs.uk/conditions/generalised-anxiety-disorder/symptoms/   [employer] ? [facility]  https://www.renny.nih.gov/health/relieving-stress-anxiety-zyjwxenpq-hfmmyrzpko-kqmmrsbrdi   National [employer] of Mental Health  http://www.nimh.nih.gov/health/publications/anxiety-disorders/complete-index.shtml   Last Reviewed Date   2021-06-08  Consumer Information Use and Disclaimer   This information is not specific medical advice and does not replace information you receive from your health care provider. This is only a brief summary of general information. It does NOT include all information about conditions, illnesses, injuries, tests, procedures, treatments, therapies, discharge instructions or life-style choices that may apply to you. You must talk with your health care provider for complete information  about your health and treatment options. This information should not be used to decide whether or not to accept your health care providers advice, instructions or recommendations. Only your health care provider has the knowledge and training to provide advice that is right for you.  Copyright   Copyright © 2021 [employer] and its affiliates and/or licensors. All rights reserved.

## 2024-01-19 NOTE — TELEPHONE ENCOUNTER
Spoke to pharmacists, they are having computer issues. Pt's meds verbally called in and confirmed.

## 2024-01-21 ENCOUNTER — PATIENT MESSAGE (OUTPATIENT)
Dept: UROLOGY | Facility: CLINIC | Age: 39
End: 2024-01-21
Payer: MEDICAID

## 2024-01-24 ENCOUNTER — TELEPHONE (OUTPATIENT)
Dept: UROLOGY | Facility: CLINIC | Age: 39
End: 2024-01-24
Payer: MEDICAID

## 2024-01-24 PROBLEM — Z98.891 STATUS POST CESAREAN DELIVERY: Status: RESOLVED | Noted: 2021-09-30 | Resolved: 2024-01-24

## 2024-01-24 PROBLEM — O23.40 UTI (URINARY TRACT INFECTION) DURING PREGNANCY: Status: RESOLVED | Noted: 2022-02-04 | Resolved: 2024-01-24

## 2024-01-24 PROBLEM — R78.81 BACTEREMIA: Status: RESOLVED | Noted: 2021-09-30 | Resolved: 2024-01-24

## 2024-01-25 ENCOUNTER — TELEPHONE (OUTPATIENT)
Dept: UROLOGY | Facility: CLINIC | Age: 39
End: 2024-01-25
Payer: MEDICAID

## 2024-01-25 NOTE — TELEPHONE ENCOUNTER
Contacted pt, advised of results per BL. PT verbalized understanding she states she will keep scheduled appt since its nothing urgent. BJP    ----- Message from Erika Mane MA sent at 1/25/2024 12:23 PM CST -----  Contact: self    ----- Message -----  From: Taylor Fuentes  Sent: 1/25/2024   8:03 AM CST  To: [facility] Staff    Type:  Patient Returning Call    Who Called:Sari Fernandez  Who Left Message for Patient:grace  Does the patient know what this is regarding?:appt  Would the patient rather a call back or a response via [facility]?   Best Call Back Number:[phone]  Additional Information: n/a

## 2024-02-15 ENCOUNTER — PATIENT MESSAGE (OUTPATIENT)
Dept: PRIMARY CARE CLINIC | Facility: CLINIC | Age: 39
End: 2024-02-15
Payer: MEDICAID

## 2024-02-15 DIAGNOSIS — N28.1 RENAL CYST: ICD-10-CM

## 2024-02-15 DIAGNOSIS — N20.0 RECURRENT KIDNEY STONES: Primary | ICD-10-CM

## 2024-03-25 ENCOUNTER — PATIENT MESSAGE (OUTPATIENT)
Dept: PRIMARY CARE CLINIC | Facility: CLINIC | Age: 39
End: 2024-03-25
Payer: MEDICAID

## 2024-03-25 DIAGNOSIS — B96.89 BACTERIAL SKIN INFECTION: Primary | ICD-10-CM

## 2024-03-25 DIAGNOSIS — L08.9 BACTERIAL SKIN INFECTION: Primary | ICD-10-CM

## 2024-03-25 RX ORDER — AMOXICILLIN 500 MG/1
500 TABLET, FILM COATED ORAL EVERY 12 HOURS
Qty: 14 TABLET | Refills: 0 | Status: SHIPPED | OUTPATIENT
Start: 2024-03-25 | End: 2024-04-26

## 2024-04-03 ENCOUNTER — CLINICAL SUPPORT (OUTPATIENT)
Dept: UROLOGY | Facility: CLINIC | Age: 39
End: 2024-04-03
Payer: MEDICAID

## 2024-04-03 DIAGNOSIS — R30.0 DYSURIA: Primary | ICD-10-CM

## 2024-04-03 LAB
BILIRUBIN, UA POC OHS: NEGATIVE
BLOOD, UA POC OHS: NEGATIVE
CLARITY, UA POC OHS: CLEAR
COLOR, UA POC OHS: YELLOW
GLUCOSE, UA POC OHS: NEGATIVE
KETONES, UA POC OHS: NEGATIVE
LEUKOCYTES, UA POC OHS: NEGATIVE
NITRITE, UA POC OHS: NEGATIVE
PH, UA POC OHS: 7
PROTEIN, UA POC OHS: NEGATIVE
SPECIFIC GRAVITY, UA POC OHS: 1.01
UROBILINOGEN, UA POC OHS: 0.2

## 2024-04-03 PROCEDURE — 81003 URINALYSIS AUTO W/O SCOPE: CPT | Mod: QW,S$GLB,, | Performed by: FAMILY MEDICINE

## 2024-04-11 ENCOUNTER — OFFICE VISIT (OUTPATIENT)
Dept: UROLOGY | Facility: CLINIC | Age: 39
End: 2024-04-11
Payer: MEDICAID

## 2024-04-11 VITALS
SYSTOLIC BLOOD PRESSURE: 120 MMHG | OXYGEN SATURATION: 100 % | HEIGHT: 64 IN | HEART RATE: 72 BPM | DIASTOLIC BLOOD PRESSURE: 74 MMHG | BODY MASS INDEX: 23.9 KG/M2 | RESPIRATION RATE: 20 BRPM | WEIGHT: 140 LBS

## 2024-04-11 DIAGNOSIS — N20.0 RECURRENT KIDNEY STONES: ICD-10-CM

## 2024-04-11 DIAGNOSIS — N28.1 RENAL CYST: ICD-10-CM

## 2024-04-11 PROCEDURE — 1159F MED LIST DOCD IN RCRD: CPT | Mod: CPTII,S$GLB,, | Performed by: UROLOGY

## 2024-04-11 PROCEDURE — 99214 OFFICE O/P EST MOD 30 MIN: CPT | Mod: S$GLB,,, | Performed by: UROLOGY

## 2024-04-11 PROCEDURE — 3078F DIAST BP <80 MM HG: CPT | Mod: CPTII,S$GLB,, | Performed by: UROLOGY

## 2024-04-11 PROCEDURE — 3074F SYST BP LT 130 MM HG: CPT | Mod: CPTII,S$GLB,, | Performed by: UROLOGY

## 2024-04-11 PROCEDURE — 3008F BODY MASS INDEX DOCD: CPT | Mod: CPTII,S$GLB,, | Performed by: UROLOGY

## 2024-04-11 NOTE — PROGRESS NOTES
Subjective:       Patient ID: Sari Fernandez is a 38 y.o. female.    Chief Complaint: Nephrolithiasis      HPI: 38-year-old female patient presenting to the clinic to follow up for kidney stones and renal cyst.  Patient had left [facility] on 2022.  She had recent CT in January of this year which notes bilateral small calcifications as well as renal cysts.         Past Medical History:   Past Medical History:   Diagnosis Date    ADHD     Anemia     Anxiety     Kidney stones     Urinary tract infection        Past Surgical Historical:   Past Surgical History:   Procedure Laterality Date     SECTION, LOW TRANSVERSE      KIDNEY STONE SURGERY      LITHOTRIPSY          Medications:   Medication List with Changes/Refills   Current Medications    ALPRAZOLAM (XANAX) 0.25 MG TABLET    Take 1 tablet (0.25 mg total) by mouth 2 (two) times daily as needed for Anxiety.        Past Social History:   Social History     Socioeconomic History    Marital status: Single   Tobacco Use    Smoking status: Never    Smokeless tobacco: Never   Substance and Sexual Activity    Alcohol use: Not Currently    Drug use: Never    Sexual activity: Not Currently     Partners: Male     Birth control/protection: None       Allergies: Review of patient's allergies indicates:  No Known Allergies     Family History:   Family History   Problem Relation Age of Onset    Prostate cancer Paternal Grandfather     No Known Problems Paternal Grandmother     No Known Problems Maternal Grandmother     No Known Problems Maternal Grandfather     Cancer Father     No Known Problems Mother         Review of Systems:  Review of Systems   Constitutional:  Negative for activity change, appetite change, chills, diaphoresis, fatigue, fever and unexpected weight change.   HENT:  Negative for congestion, dental problem, drooling, ear discharge, ear pain, facial swelling, hearing loss, mouth sores, nosebleeds, postnasal drip, rhinorrhea, sinus pressure, sinus  pain, sneezing, sore throat, tinnitus, trouble swallowing and voice change.    Eyes:  Negative for photophobia, pain, discharge, redness, itching and visual disturbance.   Respiratory:  Negative for apnea, cough, choking, chest tightness, shortness of breath, wheezing and stridor.    Cardiovascular:  Negative for chest pain and leg swelling.   Gastrointestinal:  Negative for abdominal distention, abdominal pain, anal bleeding, blood in stool, constipation, diarrhea, nausea, rectal pain and vomiting.   Endocrine: Negative for cold intolerance, heat intolerance, polydipsia, polyphagia and polyuria.   Genitourinary: Negative.  Negative for decreased urine volume, difficulty urinating, dyspareunia, dysuria, enuresis, flank pain, frequency, genital sores, hematuria, menstrual problem, pelvic pain, urgency, vaginal bleeding, vaginal discharge and vaginal pain.   Musculoskeletal:  Negative for arthralgias, back pain, gait problem, joint swelling, myalgias, neck pain and neck stiffness.   Skin:  Negative for color change, pallor, rash and wound.   Allergic/Immunologic: Negative for environmental allergies, food allergies and immunocompromised state.   Neurological:  Negative for dizziness, tremors, seizures, syncope, facial asymmetry, speech difficulty, weakness, light-headedness, numbness and headaches.   Hematological:  Negative for adenopathy. Does not bruise/bleed easily.   Psychiatric/Behavioral:  Negative for agitation, behavioral problems, confusion, decreased concentration, dysphoric mood, hallucinations, self-injury, sleep disturbance and suicidal ideas. The patient is not nervous/anxious and is not hyperactive.        Physical Exam:  Physical Exam  Exam conducted with a chaperone present.   Constitutional:       Appearance: Normal appearance.   HENT:      Head: Normocephalic.      Nose: Nose normal.      Mouth/Throat:      Mouth: Mucous membranes are moist.      Pharynx: Oropharynx is clear.   Eyes:       Extraocular Movements: Extraocular movements intact.      Conjunctiva/sclera: Conjunctivae normal.      Pupils: Pupils are equal, round, and reactive to light.   Cardiovascular:      Rate and Rhythm: Normal rate and regular rhythm.      Pulses: Normal pulses.      Heart sounds: Normal heart sounds.   Pulmonary:      Effort: Pulmonary effort is normal.      Breath sounds: Normal breath sounds.   Abdominal:      General: Abdomen is flat. Bowel sounds are normal.      Palpations: Abdomen is soft.      Hernia: There is no hernia in the left inguinal area or right inguinal area.   Genitourinary:     General: Normal vulva.      Pubic Area: No rash or pubic lice.       Labia:         Right: No rash, tenderness, lesion or injury.         Left: No rash, tenderness, lesion or injury.       Urethra: No prolapse, urethral pain, urethral swelling or urethral lesion.      Rectum: Normal.   Musculoskeletal:         General: Normal range of motion.      Cervical back: Normal range of motion and neck supple.   Lymphadenopathy:      Lower Body: No right inguinal adenopathy. No left inguinal adenopathy.   Skin:     General: Skin is warm and dry.      Capillary Refill: Capillary refill takes less than 2 seconds.   Neurological:      General: No focal deficit present.      Mental Status: She is alert and oriented to person, place, and time. Mental status is at baseline.   Psychiatric:         Mood and Affect: Mood normal.         Behavior: Behavior normal.         Thought Content: Thought content normal.         Judgment: Judgment normal.         Assessment/Plan:     Kidney stones:  CT from 01/21/2024 reviewed. There are scattered nonobstructing stones in both renal collecting systems  measuring up to 4 mm.  No surgical intervention necessary at this time.  Previous stone analysis reviewed.  Carbonate apatite (dahllite).  Order placed for 24 hour urine.  We will notify the patient of results when they are received    Renal cysts:  CT  reviewed from 01/21/2024  There is a 1.270 exophytic cyst of the upper pole left kidney for which no  follow-up is specifically indicated.  Reassured the patient these are benign findings no surgical intervention or follow up imaging necessary.     I, Dr. Marcus Turner have seen and personally evaluated the patient. I have formulated the plan reviewed all pertinent imaging and clinical data.  I agree with the nurse practitioner's assessment, and I have personally formulated the plan for this patient's care as described by the midlevel.  Problem List Items Addressed This Visit    None  Visit Diagnoses       Recurrent kidney stones        Renal cyst

## 2024-04-12 ENCOUNTER — CLINICAL SUPPORT (OUTPATIENT)
Dept: OBSTETRICS AND GYNECOLOGY | Facility: CLINIC | Age: 39
End: 2024-04-12
Payer: MEDICAID

## 2024-04-12 DIAGNOSIS — Z01.89 ROUTINE LAB DRAW: Primary | ICD-10-CM

## 2024-04-12 LAB
25(OH)D3 SERPL-MCNC: 24 NG/ML
ABS NRBC COUNT: 0 THOU/UL (ref 0–0.01)
ABSOLUTE BASOPHIL: 0 10*3/UL (ref 0–0.3)
ABSOLUTE EOSINOPHIL: 0.1 10*3/UL (ref 0–0.6)
ABSOLUTE IMMATURE GRAN: 0.01 THOU/UL (ref 0–0.03)
ABSOLUTE LYMPHOCYTE: 1.3 10*3/UL (ref 1.2–4)
ABSOLUTE MONOCYTE: 0.3 10*3/UL (ref 0.1–0.8)
ALBUMIN SERPL BCP-MCNC: 4 G/DL (ref 3.4–5)
ALP SERPL-CCNC: 88 U/L (ref 45–117)
ALT SERPL W P-5'-P-CCNC: 20 U/L (ref 13–56)
ANION GAP SERPL CALC-SCNC: 8 MMOL/L (ref 3–11)
APPEARANCE, UA: CLEAR
AST SERPL-CCNC: 13 U/L (ref 15–37)
BASOPHILS NFR BLD: 0.3 % (ref 0–3)
BILIRUB SERPL-MCNC: 0.7 MG/DL (ref 0.2–1)
BILIRUB UR QL STRIP: NEGATIVE
BUN SERPL-MCNC: 16 MG/DL (ref 7–18)
BUN/CREAT SERPL: 17.58 RATIO
CALCIUM SERPL-MCNC: 9.4 MG/DL (ref 8.5–10.1)
CHLORIDE SERPL-SCNC: 106 MMOL/L (ref 98–107)
CHOLEST SERPL-MSCNC: 202 MG/DL
CO2 SERPL-SCNC: 23 MMOL/L (ref 21–32)
COLOR UR: NORMAL
CREAT SERPL-MCNC: 0.91 MG/DL (ref 0.55–1.02)
EOSINOPHIL NFR BLD: 2 % (ref 0–6)
ERYTHROCYTE [DISTWIDTH] IN BLOOD BY AUTOMATED COUNT: 12.7 % (ref 0–15.5)
ESTIMATED AVG GLUCOSE: 94 MG/DL
GFR ESTIMATION: > 60
GLUCOSE (UA): NEGATIVE MG/DL
GLUCOSE SERPL-MCNC: 81 MG/DL (ref 74–106)
HBA1C MFR BLD: 4.8 % (ref 4.2–6.3)
HCT VFR BLD AUTO: 42.5 % (ref 37–47)
HDLC SERPL-MCNC: 56 MG/DL
HGB BLD-MCNC: 14.4 G/DL (ref 12–16)
HGB UR QL STRIP: NEGATIVE
IMMATURE GRANULOCYTES: 0.2 % (ref 0–0.43)
KETONES UR QL STRIP: NEGATIVE MG/DL
LDLC SERPL CALC-MCNC: 135.4 MG/DL
LEUKOCYTE ESTERASE UR QL STRIP: NEGATIVE LEU/UL
LYMPHOCYTES NFR BLD: 19.8 % (ref 20–45)
MCH RBC QN AUTO: 29 PG (ref 27–32)
MCHC RBC AUTO-ENTMCNC: 33.9 % (ref 32–36)
MCV RBC AUTO: 85.5 FL (ref 80–99)
MONOCYTES NFR BLD: 5.2 % (ref 2–10)
NEUTROPHILS # BLD AUTO: 4.8 10*3/UL (ref 1.4–7)
NEUTROPHILS NFR BLD: 72.5 % (ref 50–80)
NITRITE UR QL STRIP: NEGATIVE
NUCLEATED RED BLOOD CELLS: 0 % (ref 0–0.2)
PH UR STRIP: 6.5 PH (ref 5–8)
PLATELETS: 353 10*3/UL (ref 130–400)
PMV BLD AUTO: 9.3 FL (ref 9.2–12.2)
POTASSIUM SERPL-SCNC: 4.2 MMOL/L (ref 3.5–5.1)
PROT SERPL-MCNC: 7.7 G/DL (ref 6.4–8.2)
PROT UR QL STRIP: NEGATIVE MG/DL
RBC # BLD AUTO: 4.97 10*6/UL (ref 4.2–5.4)
SODIUM BLD-SCNC: 137 MMOL/L (ref 131–143)
SP GR UR STRIP: 1.02 (ref 1–1.03)
T4 FREE SP9 P CHAL SERPL-SCNC: 0.89 NG/DL (ref 0.76–1.46)
TRIGL SERPL-MCNC: 53 MG/DL (ref 0–149)
TSH SERPL DL<=0.005 MIU/L-ACNC: 3.74 UIU/ML (ref 0.36–3.74)
UROBILINOGEN UR STRIP-ACNC: NORMAL MG/DL
VLDL CHOLESTEROL: 11 MG/DL
WBC # BLD: 6.6 10*3/UL (ref 4.5–10)

## 2024-04-12 PROCEDURE — 36415 COLL VENOUS BLD VENIPUNCTURE: CPT | Mod: S$GLB,,, | Performed by: NURSE PRACTITIONER

## 2024-04-15 ENCOUNTER — PATIENT MESSAGE (OUTPATIENT)
Dept: PRIMARY CARE CLINIC | Facility: CLINIC | Age: 39
End: 2024-04-15
Payer: MEDICAID

## 2024-04-15 DIAGNOSIS — E55.9 VITAMIN D DEFICIENCY: Primary | ICD-10-CM

## 2024-04-15 RX ORDER — ASPIRIN 325 MG
50000 TABLET, DELAYED RELEASE (ENTERIC COATED) ORAL WEEKLY
Qty: 8 CAPSULE | Refills: 0 | Status: SHIPPED | OUTPATIENT
Start: 2024-04-15 | End: 2024-06-04

## 2024-04-15 NOTE — PROGRESS NOTES
Lab results reviewed, Vitamin D level low, will treat with high dose Vitamin D capsules for 8 weeks. All other lab results are within normal range.

## 2024-04-16 ENCOUNTER — PATIENT MESSAGE (OUTPATIENT)
Dept: PRIMARY CARE CLINIC | Facility: CLINIC | Age: 39
End: 2024-04-16
Payer: MEDICAID

## 2024-04-26 ENCOUNTER — OFFICE VISIT (OUTPATIENT)
Dept: PRIMARY CARE CLINIC | Facility: CLINIC | Age: 39
End: 2024-04-26
Payer: MEDICAID

## 2024-04-26 VITALS
HEIGHT: 64 IN | WEIGHT: 147.88 LBS | BODY MASS INDEX: 25.25 KG/M2 | TEMPERATURE: 98 F | OXYGEN SATURATION: 98 % | DIASTOLIC BLOOD PRESSURE: 62 MMHG | SYSTOLIC BLOOD PRESSURE: 97 MMHG | HEART RATE: 75 BPM | RESPIRATION RATE: 18 BRPM

## 2024-04-26 DIAGNOSIS — E55.9 VITAMIN D DEFICIENCY: ICD-10-CM

## 2024-04-26 DIAGNOSIS — F41.9 ANXIETY: ICD-10-CM

## 2024-04-26 DIAGNOSIS — F41.1 GENERALIZED ANXIETY DISORDER: Primary | ICD-10-CM

## 2024-04-26 PROCEDURE — 99214 OFFICE O/P EST MOD 30 MIN: CPT | Mod: S$GLB,,, | Performed by: NURSE PRACTITIONER

## 2024-04-26 PROCEDURE — 3044F HG A1C LEVEL LT 7.0%: CPT | Mod: CPTII,S$GLB,, | Performed by: NURSE PRACTITIONER

## 2024-04-26 PROCEDURE — 3074F SYST BP LT 130 MM HG: CPT | Mod: CPTII,S$GLB,, | Performed by: NURSE PRACTITIONER

## 2024-04-26 PROCEDURE — 1160F RVW MEDS BY RX/DR IN RCRD: CPT | Mod: CPTII,S$GLB,, | Performed by: NURSE PRACTITIONER

## 2024-04-26 PROCEDURE — 3078F DIAST BP <80 MM HG: CPT | Mod: CPTII,S$GLB,, | Performed by: NURSE PRACTITIONER

## 2024-04-26 PROCEDURE — 3008F BODY MASS INDEX DOCD: CPT | Mod: CPTII,S$GLB,, | Performed by: NURSE PRACTITIONER

## 2024-04-26 PROCEDURE — 1159F MED LIST DOCD IN RCRD: CPT | Mod: CPTII,S$GLB,, | Performed by: NURSE PRACTITIONER

## 2024-04-26 RX ORDER — ALPRAZOLAM 0.25 MG/1
0.25 TABLET ORAL 2 TIMES DAILY PRN
Qty: 60 TABLET | Refills: 1 | Status: SHIPPED | OUTPATIENT
Start: 2024-04-26

## 2024-04-26 NOTE — PATIENT INSTRUCTIONS
RTC in 3 months for F/U or sooner if needed.    Patient Education       Anxiety Discharge Instructions, Adult   About this topic   Anxiety can cause you to feel very worried. It can also cause physical symptoms like chest pain, stomach aches, or trouble sleeping. While mild anxiety is a normal response to stress, it can cause you problems in your everyday life. You may need follow-up care to help manage your anxiety. Anxiety happens in many forms, like:  Being scared all the time that something bad is going to happen. This is generalized anxiety.  Strong bursts of fear where your body has signs that may feel like a heart attack. This is called a panic attack.  Upsetting thoughts that happen often. There is a need to repeat doing certain things to help get rid of the anxiety caused by these thoughts. The thoughts or actions may be about checking on things, touching things, or worry about germs. This is an obsessive-compulsive disorder.  Strong fear of an object, place, or condition. This is a phobia.  Fear that others think bad things about you or being put down by other people. This is social anxiety.  Nightmares, flashbacks, staying away from people, or having panic attacks when reminded of a shocking or hurtful time or place from the past. This is post-traumatic stress.  Anxiety disorder may be treated in many ways. Some kinds of treatment have you talk about your beliefs, fears, and worries. You may learn how certain thoughts or feelings can raise anxiety. You may also learn what steps to take to lower anxiety. Other kinds of treatment may have you look back on a hurtful event, sad memory, or something you are afraid of. The doctor will help you deal with the feelings that you may have. You may learn ways to cope with unwanted events or thoughts by looking at your fears in a way that feels safe.  What care is needed at home?   Ask your doctor what you need to do when you go home. Make sure you ask questions if  you do not understand what the doctor says.  Set a time to talk with a counselor about your worries and feelings. This can help you with your anxiety.  Take care to follow all instructions when you take your medicines.  Limit alcohol and caffeine.  Learn ways to manage stress. Relaxation methods like reflection, deep breathing, and muscle relaxation may be helpful. Things like yoga, exercise, and maya chi are also good.  Talk about your feelings with family members and friends you trust. Talk to someone who can help you see how your thoughts at certain times may raise your anxiety.     What follow-up care is needed?   Your doctor may ask you to make visits to the office to check on your progress. Be sure to keep these visits.  What drugs may be needed?   The doctor may order drugs to help the physical signs of anxiety. Make sure that you take the drugs as taught to you by the doctor. Talk with your doctor about any side effects and ask how long you should take the drug.  Will physical activity be limited?   You may take part in physical activities. Some people are limited because of their anxiety or fear. Talk with your doctor about the right amount of activity for you.  What changes to diet are needed?   Eat a variety of healthy foods and limit drinks with caffeine. You should avoid alcohol, energy drinks, and over-the-counter stimulants.  What problems could happen?   If your anxiety is not treated, it can result in:  Staying away from work or social events  Not being able to do everyday tasks  Keeping away from family and friends  What can be done to prevent this health problem?   Learn what events, people, or things upset you. Limit your contact with them.  Talk about your feelings. Talk to someone who can help you see how your thoughts at certain times may raise your anxiety.  Seek support from your friends and family. Find someone who calms you down. Ask if you can call them when you are getting anxious.  When  do I need to call the doctor?   You feel you may harm yourself or someone else.  You can also call a mental health hotline for help.  You have any physical symptoms, such as chest pain, trouble breathing, or severe belly pain, that could be a sign of a serious problem.  If you are short of breath.  If you do not feel like you can be alone.  Teach Back: Helping You Understand   The Teach Back Method helps you understand the information we are giving you. After you talk with the staff, tell them in your own words what you learned. This helps to make sure the staff has described each thing clearly. It also helps to explain things that may have been confusing. Before going home, make sure you can do these:  I can tell you about my condition and the drugs I need to take.  I can tell you what may help lower my anxiety.  I can tell you what I will do if it is hard to breathe or I have chest pain.  I can tell you what I will do if I do not feel safe or cannot be alone.  Where can I learn more?   [facility]  https://www.chrissy.org/Learn-More/Mental-Health-Conditions/Anxiety-Disorders   National Health Service  https://www.nhs.uk/conditions/generalised-anxiety-disorder/symptoms/   National Timewell of Health ? [facility]  https://www.renny.nih.gov/health/relieving-stress-anxiety-oyeprqurs-cwplwgyqeg-asywajjghp   National Timewell of Mental Health  http://www.nimh.nih.gov/health/publications/anxiety-disorders/complete-index.shtml   Last Reviewed Date   2021-06-08  Consumer Information Use and Disclaimer   This information is not specific medical advice and does not replace information you receive from your health care provider. This is only a brief summary of general information. It does NOT include all information about conditions, illnesses, injuries, tests, procedures, treatments, therapies, discharge instructions or life-style choices that may apply to you. You must talk with your health care provider for complete information  "about your health and treatment options. This information should not be used to decide whether or not to accept your health care providers advice, instructions or recommendations. Only your health care provider has the knowledge and training to provide advice that is right for you.  Copyright   Copyright © 2021 UpToDate, Inc. and its affiliates and/or licensors. All rights reserved.      Patient Education       Vitamin D Deficiency   The Basics   Written by the doctors and editors at [facility]   What is vitamin D deficiency? -- Vitamin D deficiency is when you do not have enough vitamin D in your body. This is a problem, because the body needs vitamin D to absorb calcium and for other important jobs.  People who do not have enough vitamin D can:  Have weak or soft bones, which can break easily or change in shape  Have weak muscles, which makes them more likely to fall  Is there a test for vitamin D deficiency? -- Yes. Your doctor or nurse can do a blood test to see if you have enough vitamin D. But you might not need this test. Doctors measure vitamin D levels only in people who are at risk for vitamin D deficiency. This includes people who:  Spend most or all of their time indoors (for example, because they are in a nursing home)  Have medical problems (such as celiac disease) that make it hard for them to absorb vitamin D  Have a condition called "osteoporosis," which makes bones weak  Broke a bone too easily, such as by falling down  What foods and drinks have vitamin D? -- Foods and drinks that have a lot of vitamin D include (figure 1):  Milk, orange juice, or yogurt with vitamin D added  [place] or mackerel  Canned tuna fish  Cereals with vitamin D added  Cod liver oil  People's bodies can also get vitamin D from the sun. The body uses sunlight that shines on the skin to make vitamin D.  What are supplements? -- Supplements are pills, capsules, or liquids that have nutrients in them. Supplements are another way " people can get vitamin D.  Do I need to take vitamin D supplements? -- Experts recommend that most adults take supplements that have 600 to 800 international units of vitamin D a day. This is the same as 15 to 20 micrograms of vitamin D. People who do not get enough vitamin D from their food or from the sun might need to take even more.  If your doctor recommends that you take vitamin D supplements, ask him or her which type, how much, and when to take the supplements.  The type and dose of supplement that is right for you will depend on your medical problems and the other medicines you take.  It is important not to take too much vitamin D. Taking too much vitamin D can make you sick.  Can I get extra vitamin D from the sun? -- It is not a good idea to try to get vitamin D by spending time in the sun or using tanning beds. This can lead to sunburn and increase your risk of skin cancer.  All topics are updated as new evidence becomes available and our peer review process is complete.  This topic retrieved from [employer] on: Sep 21, 2021.  Topic 37673 Version 10.0  Release: 29.4.2 - C29.263  © 2021 UpToDate, Inc. and/or its affiliates. All rights reserved.  figure 1: Foods and drinks with vitamin D     Foods rich in vitamin D include milk, canned tuna fish, cod liver oil, yogurt, ready-to-eat-cereals, salmon, canned sardines, mackerel, and eggs.  Graphic 45580 Version 2.0    Consumer Information Use and Disclaimer   This information is not specific medical advice and does not replace information you receive from your health care provider. This is only a brief summary of general information. It does NOT include all information about conditions, illnesses, injuries, tests, procedures, treatments, therapies, discharge instructions or life-style choices that may apply to you. You must talk with your health care provider for complete information about your health and treatment options. This information should not be used to  decide whether or not to accept your health care provider's advice, instructions or recommendations. Only your health care provider has the knowledge and training to provide advice that is right for you. The use of this information is governed by the [facility] End User License Agreement, available at https://www.[employer]/en/solutions/Fancorps/about/justen.The use of [employer] content is governed by the [employer] Terms of Use. ©2021 UpToDate, Inc. All rights reserved.  Copyright   © 2021 UpToDate, Inc. and/or its affiliates. All rights reserved.

## 2024-04-26 NOTE — PROGRESS NOTES
Subjective:       Patient ID: Sari Fernandez is a 38 y.o. female.    Chief Complaint: Follow-up    HPI: Sari is a 38 y.o. female who presents for F/U.    Lab results reviewed, Vitamin D level was low so she is taking high dose Vitamin D capsules weekly. All other lab results were within normal range.    Feels well today with no new issues or concerns.    Anxiety, chronic issue and is controlled with xanax as needed and is working well. Denies being depressed.    Hypothyroidism - TSH found to be elevated on labs but she refuses medication at this time. TSH increased slightly to 3.96 on recent labs from 3.56 last August and T4 Free is normal. Her mom also has thyroid disease. She was prescribed levothyroxine 25 mcg but did not take the medication to see if her level would come down on its own and it did.     Followed by Dr. Larios for GYN. PAP UTD.    Denies drinking or smoking.    Refuses FLU and COVID vaccines.                      Past Medical History:   Diagnosis Date    ADHD     Anemia     Anxiety     Kidney stones     Urinary tract infection        Past Surgical History:   Procedure Laterality Date     SECTION, LOW TRANSVERSE      KIDNEY STONE SURGERY      LITHOTRIPSY         Family History   Problem Relation Name Age of Onset    Prostate cancer Paternal Grandfather Willis Fernandez     Cancer Paternal Grandfather Willis Fernandez     No Known Problems Paternal Grandmother      No Known Problems Maternal Grandmother      No Known Problems Maternal Grandfather      Cancer Father Srikanth hubbard     No Known Problems Mother         Social History     Tobacco Use    Smoking status: Never    Smokeless tobacco: Never   Substance Use Topics    Alcohol use: Never    Drug use: Never       Patient Active Problem List   Diagnosis    Anxiety disorder    Attention deficit hyperactivity disorder, predominantly inattentive type    Iron deficiency anemia    Other specified hypothyroidism       Immunization History  "  Administered Date(s) Administered    DTaP 1987, 04/08/1988, 06/23/1988, 04/18/1989, 08/06/1992    HIB 06/23/1999    Hepatitis B, Pediatric/Adolescent 07/09/1998, 08/12/1998, 04/21/1999    IPV 1987, 04/08/1988, 06/23/1988, 04/18/1989, 08/06/1992    MMR 11/14/1988, 08/06/1992    Td (ADULT) 07/09/1998, 11/12/2008    Tdap 07/09/1998, 11/12/2008, 05/31/2021, 06/28/2021           Review of Systems   Constitutional:  Negative for activity change, appetite change, chills, diaphoresis, fatigue and fever.   HENT:  Negative for congestion, ear pain, sinus pain, tinnitus and trouble swallowing.    Eyes:  Negative for visual disturbance.   Respiratory:  Negative for cough, chest tightness, shortness of breath and wheezing.    Cardiovascular:  Negative for chest pain, palpitations and leg swelling.   Gastrointestinal:  Negative for abdominal distention, abdominal pain, blood in stool, constipation, diarrhea and nausea.   Genitourinary:  Negative for decreased urine volume, dysuria, frequency, hematuria and urgency.   Musculoskeletal:  Negative for gait problem.   Skin:  Negative for color change and rash.   Neurological:  Negative for dizziness, syncope, weakness, light-headedness, numbness and headaches.   Psychiatric/Behavioral:  Negative for behavioral problems, confusion and dysphoric mood. The patient is not nervous/anxious.      Objective:     Vitals:    04/26/24 1032   BP: 97/62   BP Location: Left arm   Patient Position: Sitting   BP Method: Small (Automatic)   Pulse: 75   Resp: 18   Temp: 98.2 °F (36.8 °C)   TempSrc: Oral   SpO2: 98%   Weight: 67.1 kg (147 lb 14.4 oz)   Height: 5' 4" (1.626 m)       Physical Exam  Vitals and nursing note reviewed.   Constitutional:       General: She is not in acute distress.     Appearance: Normal appearance. She is not diaphoretic.   HENT:      Head: Normocephalic and atraumatic.   Eyes:      General:         Right eye: No discharge.         Left eye: No discharge.      " Pupils: Pupils are equal, round, and reactive to light.   Cardiovascular:      Rate and Rhythm: Normal rate and regular rhythm.      Heart sounds: Normal heart sounds.   Pulmonary:      Effort: Pulmonary effort is normal.      Breath sounds: Normal breath sounds. No stridor. No wheezing or rales.   Abdominal:      Palpations: Abdomen is soft.      Tenderness: There is no abdominal tenderness. There is no guarding.   Musculoskeletal:         General: No tenderness. Normal range of motion.      Cervical back: Normal range of motion.      Right lower leg: No edema.      Left lower leg: No edema.   Lymphadenopathy:      Cervical: No cervical adenopathy.   Skin:     General: Skin is warm and dry.      Findings: No rash.   Neurological:      General: No focal deficit present.      Mental Status: She is alert and oriented to person, place, and time.   Psychiatric:         Mood and Affect: Mood normal.         Behavior: Behavior normal.         Orders Only on 04/12/2024   Component Date Value Ref Range Status    Vit D, 25-Hydroxy 04/12/2024 24 (L)  >30 ng/mL Final   Orders Only on 04/12/2024   Component Date Value Ref Range Status    Sodium 04/12/2024 137  131 - 143 mmol/L Final    Potassium 04/12/2024 4.2  3.5 - 5.1 mmol/L Final    Chloride 04/12/2024 106  98 - 107 mmol/L Final    CO2 04/12/2024 23  21 - 32 mmol/L Final    Anion Gap 04/12/2024 8.0  3.0 - 11.0 mmol/L Final    BUN 04/12/2024 16.0  7.0 - 18.0 mg/dL Final    Creatinine 04/12/2024 0.91  0.55 - 1.02 mg/dL Final    GFR ESTIMATION 04/12/2024 > 60  >60 Final    BUN/Creatinine Ratio 04/12/2024 17.58  Ratio Final    Glucose 04/12/2024 81  74 - 106 mg/dL Final    Calcium 04/12/2024 9.4  8.5 - 10.1 mg/dL Final    Total Bilirubin 04/12/2024 0.7  0.2 - 1.0 mg/dL Final    AST 04/12/2024 13 (L)  15 - 37 U/L Final    ALT 04/12/2024 20  13 - 56 U/L Final    Total Protein 04/12/2024 7.7  6.4 - 8.2 g/dL Final    Albumin 04/12/2024 4.0  3.4 - 5.0 g/dL Final    Alkaline  Phosphatase 04/12/2024 88  45 - 117 U/L Final    Cholesterol 04/12/2024 202 (H)  <200 mg/dL Final    Triglycerides 04/12/2024 53  0 - 149 mg/dL Final    HDL 04/12/2024 56  >39 mg/dL Final    LDL Cholesterol 04/12/2024 135.4  mg/dL Final    VLDL 04/12/2024 11  mg/dL Final    Free T4 04/12/2024 0.89  0.76 - 1.46 ng/dL Final    TSH 04/12/2024 3.740  0.358 - 3.74 uIU/mL Final   Orders Only on 04/12/2024   Component Date Value Ref Range Status    Estimated Avg Glucose 04/12/2024 94  mg/dL Final    Hemoglobin A1C 04/12/2024 4.8  4.2 - 6.3 % Final   Orders Only on 04/12/2024   Component Date Value Ref Range Status    WBC 04/12/2024 6.6  4.5 - 10.0 10*3/uL Final    RBC 04/12/2024 4.97  4.20 - 5.40 10*6/uL Final    Hemoglobin 04/12/2024 14.4  12.0 - 16.0 g/dL Final    Hematocrit 04/12/2024 42.5  37.0 - 47.0 % Final    MCV 04/12/2024 85.5  80.0 - 99.0 fL Final    MCH 04/12/2024 29.0  27.0 - 32.0 pg Final    MCHC 04/12/2024 33.9  32.0 - 36.0 % Final    RDW RBC Auto-Rto 04/12/2024 12.7  0.0 - 15.5 % Final    Platelets 04/12/2024 353  130 - 400 10*3/uL Final    MPV 04/12/2024 9.3  9.2 - 12.2 fL Final    Neutrophils 04/12/2024 72.5  50 - 80 % Final    Immature Granulocytes 04/12/2024 0.20  0.0 - 0.43 % Final    Lymphocytes 04/12/2024 19.8 (L)  20.0 - 45.0 % Final    Monocytes 04/12/2024 5.2  2 - 10 % Final    Eosinophils 04/12/2024 2.0  0 - 6 % Final    Basophils 04/12/2024 0.3  0 - 3 % Final    nRBC# 04/12/2024 0.0  0 - 0.2 % Final    Neutrophils Absolute 04/12/2024 4.8  1.4 - 7.0 10*3/uL Final    Immature Granulocytes Absolute 04/12/2024 0.0100  0.0 - 0.0310 thou/uL Final    Lymphocytes Absolute 04/12/2024 1.3  1.2 - 4.0 10*3/uL Final    Monocytes Absolute 04/12/2024 0.3  0.1 - 0.8 10*3/uL Final    Eosinophils Absolute 04/12/2024 0.1  0.0 - 0.6 10*3/uL Final    Basophils Absolute 04/12/2024 0.0  0.0 - 0.3 10*3/uL Final    nRBC Count Absolute 04/12/2024 0.000  0 - 0.012 thou/uL Final   Orders Only on 04/12/2024   Component Date  Value Ref Range Status    Color, UA 04/12/2024 LtYellow  Yellow Final    Appearance, UA 04/12/2024 Clear  Clear Final    pH, UA 04/12/2024 6.5  5.0 - 8.0 pH Final    Specific Gravity, UA 04/12/2024 1.017  1.005 - 1.030 Final    Protein, UA 04/12/2024 Negative  Negative mg/dL Final    Glucose, UA 04/12/2024 Negative  Negative mg/dL Final    Ketones, UA 04/12/2024 Negative  Negative mg/dL Final    Occult Blood UA 04/12/2024 Negative  Negative Final    Nitrite, UA 04/12/2024 Negative  Negative Final    Bilirubin (UA) 04/12/2024 Negative  Negative Final    Urobilinogen, UA 04/12/2024 Normal  Normal mg/dL Final    Leukocytes, UA 04/12/2024 Negative  Negative Nolan/uL Final   Clinical Support on 04/03/2024   Component Date Value Ref Range Status    Color, POC UA 04/03/2024 Yellow  Yellow, Straw, Colorless Final    Clarity, POC UA 04/03/2024 Clear  Clear Final    Glucose, POC UA 04/03/2024 Negative  Negative Final    Bilirubin, POC UA 04/03/2024 Negative  Negative Final    Ketones, POC UA 04/03/2024 Negative  Negative Final    Spec Grav POC UA 04/03/2024 1.015  1.005 - 1.030 Final    Blood, POC UA 04/03/2024 Negative  Negative Final    pH, POC UA 04/03/2024 7.0  5.0 - 8.0 Final    Protein, POC UA 04/03/2024 Negative  Negative Final    Urobilinogen, POC UA 04/03/2024 0.2  <=1.0 Final    Nitrite, POC UA 04/03/2024 Negative  Negative Final    WBC, POC UA 04/03/2024 Negative  Negative Final         Assessment:      1. Generalized anxiety disorder    2. Vitamin D deficiency    3. Anxiety          Plan:     Generalized anxiety disorder    Vitamin D deficiency    Anxiety  Comments:  continue xanax as needed, F/U in 2 months  Orders:  -     ALPRAZolam (XANAX) 0.25 MG tablet; Take 1 tablet (0.25 mg total) by mouth 2 (two) times daily as needed for Anxiety.  Dispense: 60 tablet; Refill: 1         Current Outpatient Medications   Medication Sig Dispense Refill    cholecalciferol, vitamin D3, (DECARA) 1,250 mcg (50,000 unit) capsule  Take 1 capsule (50,000 Units total) by mouth once a week. for 8 doses 8 capsule 0    ALPRAZolam (XANAX) 0.25 MG tablet Take 1 tablet (0.25 mg total) by mouth 2 (two) times daily as needed for Anxiety. 60 tablet 1     No current facility-administered medications for this visit.       Medications Discontinued During This Encounter   Medication Reason    amoxicillin (AMOXIL) 500 MG Tab Patient no longer taking    ALPRAZolam (XANAX) 0.25 MG tablet Reorder       Health Maintenance   Topic Date Due    TETANUS VACCINE  06/28/2031    Hepatitis C Screening  Completed    Lipid Panel  Completed       Patient Instructions   RTC in 3 months for F/U or sooner if needed.    Patient Education       Anxiety Discharge Instructions, Adult   About this topic   Anxiety can cause you to feel very worried. It can also cause physical symptoms like chest pain, stomach aches, or trouble sleeping. While mild anxiety is a normal response to stress, it can cause you problems in your everyday life. You may need follow-up care to help manage your anxiety. Anxiety happens in many forms, like:  Being scared all the time that something bad is going to happen. This is generalized anxiety.  Strong bursts of fear where your body has signs that may feel like a heart attack. This is called a panic attack.  Upsetting thoughts that happen often. There is a need to repeat doing certain things to help get rid of the anxiety caused by these thoughts. The thoughts or actions may be about checking on things, touching things, or worry about germs. This is an obsessive-compulsive disorder.  Strong fear of an object, place, or condition. This is a phobia.  Fear that others think bad things about you or being put down by other people. This is social anxiety.  Nightmares, flashbacks, staying away from people, or having panic attacks when reminded of a shocking or hurtful time or place from the past. This is post-traumatic stress.  Anxiety disorder may be treated in  many ways. Some kinds of treatment have you talk about your beliefs, fears, and worries. You may learn how certain thoughts or feelings can raise anxiety. You may also learn what steps to take to lower anxiety. Other kinds of treatment may have you look back on a hurtful event, sad memory, or something you are afraid of. The doctor will help you deal with the feelings that you may have. You may learn ways to cope with unwanted events or thoughts by looking at your fears in a way that feels safe.  What care is needed at home?   Ask your doctor what you need to do when you go home. Make sure you ask questions if you do not understand what the doctor says.  Set a time to talk with a counselor about your worries and feelings. This can help you with your anxiety.  Take care to follow all instructions when you take your medicines.  Limit alcohol and caffeine.  Learn ways to manage stress. Relaxation methods like reflection, deep breathing, and muscle relaxation may be helpful. Things like yoga, exercise, and maya chi are also good.  Talk about your feelings with family members and friends you trust. Talk to someone who can help you see how your thoughts at certain times may raise your anxiety.     What follow-up care is needed?   Your doctor may ask you to make visits to the office to check on your progress. Be sure to keep these visits.  What drugs may be needed?   The doctor may order drugs to help the physical signs of anxiety. Make sure that you take the drugs as taught to you by the doctor. Talk with your doctor about any side effects and ask how long you should take the drug.  Will physical activity be limited?   You may take part in physical activities. Some people are limited because of their anxiety or fear. Talk with your doctor about the right amount of activity for you.  What changes to diet are needed?   Eat a variety of healthy foods and limit drinks with caffeine. You should avoid alcohol, energy drinks, and  over-the-counter stimulants.  What problems could happen?   If your anxiety is not treated, it can result in:  Staying away from work or social events  Not being able to do everyday tasks  Keeping away from family and friends  What can be done to prevent this health problem?   Learn what events, people, or things upset you. Limit your contact with them.  Talk about your feelings. Talk to someone who can help you see how your thoughts at certain times may raise your anxiety.  Seek support from your friends and family. Find someone who calms you down. Ask if you can call them when you are getting anxious.  When do I need to call the doctor?   You feel you may harm yourself or someone else.  You can also call a mental health hotline for help.  You have any physical symptoms, such as chest pain, trouble breathing, or severe belly pain, that could be a sign of a serious problem.  If you are short of breath.  If you do not feel like you can be alone.  Teach Back: Helping You Understand   The Teach Back Method helps you understand the information we are giving you. After you talk with the staff, tell them in your own words what you learned. This helps to make sure the staff has described each thing clearly. It also helps to explain things that may have been confusing. Before going home, make sure you can do these:  I can tell you about my condition and the drugs I need to take.  I can tell you what may help lower my anxiety.  I can tell you what I will do if it is hard to breathe or I have chest pain.  I can tell you what I will do if I do not feel safe or cannot be alone.  Where can I learn more?   [facility]  https://www.chrissy.org/Learn-More/Mental-Health-Conditions/Anxiety-Disorders   National Health Service  https://www.nhs.uk/conditions/generalised-anxiety-disorder/symptoms/   National [place] of Health ? [facility]  https://www.renny.nih.gov/health/relieving-stress-anxiety-qoblxefvf-tmqowlzxqb-uutyisjmdz   National  [place] of Mental Health  http://www.nimh.nih.gov/health/publications/anxiety-disorders/complete-index.shtml   Last Reviewed Date   2021-06-08  Consumer Information Use and Disclaimer   This information is not specific medical advice and does not replace information you receive from your health care provider. This is only a brief summary of general information. It does NOT include all information about conditions, illnesses, injuries, tests, procedures, treatments, therapies, discharge instructions or life-style choices that may apply to you. You must talk with your health care provider for complete information about your health and treatment options. This information should not be used to decide whether or not to accept your health care providers advice, instructions or recommendations. Only your health care provider has the knowledge and training to provide advice that is right for you.  Copyright   Copyright © 2021 UpToDate, Inc. and its affiliates and/or licensors. All rights reserved.      Patient Education       Vitamin D Deficiency   The Basics   Written by the doctors and editors at [facility]   What is vitamin D deficiency? -- Vitamin D deficiency is when you do not have enough vitamin D in your body. This is a problem, because the body needs vitamin D to absorb calcium and for other important jobs.  People who do not have enough vitamin D can:  Have weak or soft bones, which can break easily or change in shape  Have weak muscles, which makes them more likely to fall  Is there a test for vitamin D deficiency? -- Yes. Your doctor or nurse can do a blood test to see if you have enough vitamin D. But you might not need this test. Doctors measure vitamin D levels only in people who are at risk for vitamin D deficiency. This includes people who:  Spend most or all of their time indoors (for example, because they are in a nursing home)  Have medical problems (such as celiac disease) that make it hard for them to  "absorb vitamin D  Have a condition called "osteoporosis," which makes bones weak  Broke a bone too easily, such as by falling down  What foods and drinks have vitamin D? -- Foods and drinks that have a lot of vitamin D include (figure 1):  Milk, orange juice, or yogurt with vitamin D added  [place] or mackerel  Canned tuna fish  Cereals with vitamin D added  Cod liver oil  People's bodies can also get vitamin D from the sun. The body uses sunlight that shines on the skin to make vitamin D.  What are supplements? -- Supplements are pills, capsules, or liquids that have nutrients in them. Supplements are another way people can get vitamin D.  Do I need to take vitamin D supplements? -- Experts recommend that most adults take supplements that have 600 to 800 international units of vitamin D a day. This is the same as 15 to 20 micrograms of vitamin D. People who do not get enough vitamin D from their food or from the sun might need to take even more.  If your doctor recommends that you take vitamin D supplements, ask him or her which type, how much, and when to take the supplements.  The type and dose of supplement that is right for you will depend on your medical problems and the other medicines you take.  It is important not to take too much vitamin D. Taking too much vitamin D can make you sick.  Can I get extra vitamin D from the sun? -- It is not a good idea to try to get vitamin D by spending time in the sun or using tanning beds. This can lead to sunburn and increase your risk of skin cancer.  All topics are updated as new evidence becomes available and our peer review process is complete.  This topic retrieved from [employer] on: Sep 21, 2021.  Topic 76121 Version 10.0  Release: 29.4.2 - C29.263  © 2021 UpToDate, Inc. and/or its affiliates. All rights reserved.  figure 1: Foods and drinks with vitamin D     Foods rich in vitamin D include milk, canned tuna fish, cod liver oil, yogurt, ready-to-eat-cereals, salmon, " canned sardines, mackerel, and eggs.  Graphic 02580 Version 2.0    Consumer Information Use and Disclaimer   This information is not specific medical advice and does not replace information you receive from your health care provider. This is only a brief summary of general information. It does NOT include all information about conditions, illnesses, injuries, tests, procedures, treatments, therapies, discharge instructions or life-style choices that may apply to you. You must talk with your health care provider for complete information about your health and treatment options. This information should not be used to decide whether or not to accept your health care provider's advice, instructions or recommendations. Only your health care provider has the knowledge and training to provide advice that is right for you. The use of this information is governed by the [employer] End User License Agreement, available at https://www.DZZOM/en/solutions/playnik/about/justen.The use of [employer] content is governed by the [employer] Terms of Use. ©2021 UpToDate, Inc. All rights reserved.  Copyright   © 2021 UpToDate, Inc. and/or its affiliates. All rights reserved.        Risks, benefits, and alternatives discussed with patient, Patient verbalized understanding of discussed plan of care. Asked patient if any further questions, answered no.    Future Appointments   Date Time Provider Department Center   7/29/2024  3:00 PM Antonina Campbell NP [id number]  Shai Campbell NP

## 2024-05-08 ENCOUNTER — PATIENT MESSAGE (OUTPATIENT)
Dept: PRIMARY CARE CLINIC | Facility: CLINIC | Age: 39
End: 2024-05-08

## 2024-07-29 ENCOUNTER — OFFICE VISIT (OUTPATIENT)
Dept: PRIMARY CARE CLINIC | Facility: CLINIC | Age: 39
End: 2024-07-29
Payer: MEDICAID

## 2024-07-29 VITALS
HEIGHT: 64 IN | SYSTOLIC BLOOD PRESSURE: 121 MMHG | HEART RATE: 81 BPM | RESPIRATION RATE: 18 BRPM | DIASTOLIC BLOOD PRESSURE: 85 MMHG | TEMPERATURE: 98 F | BODY MASS INDEX: 24.59 KG/M2 | WEIGHT: 144 LBS | OXYGEN SATURATION: 97 %

## 2024-07-29 DIAGNOSIS — F41.1 GENERALIZED ANXIETY DISORDER: Primary | ICD-10-CM

## 2024-07-29 DIAGNOSIS — E78.49 OTHER HYPERLIPIDEMIA: ICD-10-CM

## 2024-07-29 DIAGNOSIS — F41.9 ANXIETY: ICD-10-CM

## 2024-07-29 DIAGNOSIS — J30.2 SEASONAL ALLERGIES: ICD-10-CM

## 2024-07-29 DIAGNOSIS — E55.9 VITAMIN D DEFICIENCY: ICD-10-CM

## 2024-07-29 PROCEDURE — 99214 OFFICE O/P EST MOD 30 MIN: CPT | Mod: S$PBB,,, | Performed by: NURSE PRACTITIONER

## 2024-07-29 PROCEDURE — 3074F SYST BP LT 130 MM HG: CPT | Mod: CPTII,,, | Performed by: NURSE PRACTITIONER

## 2024-07-29 PROCEDURE — 1160F RVW MEDS BY RX/DR IN RCRD: CPT | Mod: CPTII,,, | Performed by: NURSE PRACTITIONER

## 2024-07-29 PROCEDURE — 1159F MED LIST DOCD IN RCRD: CPT | Mod: CPTII,,, | Performed by: NURSE PRACTITIONER

## 2024-07-29 PROCEDURE — 3044F HG A1C LEVEL LT 7.0%: CPT | Mod: CPTII,,, | Performed by: NURSE PRACTITIONER

## 2024-07-29 PROCEDURE — 3008F BODY MASS INDEX DOCD: CPT | Mod: CPTII,,, | Performed by: NURSE PRACTITIONER

## 2024-07-29 PROCEDURE — 3079F DIAST BP 80-89 MM HG: CPT | Mod: CPTII,,, | Performed by: NURSE PRACTITIONER

## 2024-07-29 RX ORDER — ALPRAZOLAM 0.25 MG/1
0.25 TABLET ORAL 2 TIMES DAILY PRN
Qty: 60 TABLET | Refills: 2 | Status: SHIPPED | OUTPATIENT
Start: 2024-07-29

## 2024-07-29 RX ORDER — LORATADINE 10 MG/1
10 TABLET ORAL
COMMUNITY
Start: 2024-05-14

## 2024-07-29 NOTE — PROGRESS NOTES
Subjective:       Patient ID: Sari Fernandez is a 39 y.o. female.    Chief Complaint: Follow-up    HPI: Sari is a 39 y.o. female who presents for F/U.     Feels well today with no new issues or concerns.    Anxiety, chronic issue and is controlled with xanax as needed and is working well. Denies being depressed.    Hypothyroidism - TSH found to be elevated on labs but she refuses medication at this time. TSH increased slightly to 3.96 on recent labs from 3.56 last August and T4 Free is normal. Her mom also has thyroid disease. She was prescribed levothyroxine 25 mcg but did not take the medication to see if her level would come down on its own and it did.     Followed by Dr. Larios for GYN. PAP UTD.    Denies drinking or smoking.    Refuses FLU and COVID vaccines.                      Past Medical History:   Diagnosis Date    ADHD     Anemia     Anxiety     Kidney stones     Urinary tract infection        Past Surgical History:   Procedure Laterality Date    ADENOIDECTOMY       SECTION, LOW TRANSVERSE      KIDNEY STONE SURGERY      LITHOTRIPSY         Family History   Problem Relation Name Age of Onset    Prostate cancer Paternal Grandfather Willis Fernandez     Cancer Paternal Grandfather Willis Fernandez     No Known Problems Paternal Grandmother      No Known Problems Maternal Grandmother      No Known Problems Maternal Grandfather      Cancer Father Srikanth hubbard     No Known Problems Mother         Social History     Tobacco Use    Smoking status: Never    Smokeless tobacco: Never   Substance Use Topics    Alcohol use: Never    Drug use: Never       Patient Active Problem List   Diagnosis    Anxiety disorder    Attention deficit hyperactivity disorder, predominantly inattentive type    Iron deficiency anemia    Other specified hypothyroidism       Immunization History   Administered Date(s) Administered    DTaP 1987, 1988, 1988, 1989, 1992    HIB 1999     "Hepatitis B, Pediatric/Adolescent 07/09/1998, 08/12/1998, 04/21/1999    IPV 1987, 04/08/1988, 06/23/1988, 04/18/1989, 08/06/1992    MMR 11/14/1988, 08/06/1992    Td (ADULT) 07/09/1998, 11/12/2008    Tdap 07/09/1998, 11/12/2008, 05/31/2021, 06/28/2021           Review of Systems   Constitutional:  Negative for activity change, appetite change, chills, diaphoresis, fatigue and fever.   HENT:  Negative for congestion, ear pain, sinus pain, tinnitus and trouble swallowing.    Eyes:  Negative for visual disturbance.   Respiratory:  Negative for cough, chest tightness, shortness of breath and wheezing.    Cardiovascular:  Negative for chest pain, palpitations and leg swelling.   Gastrointestinal:  Negative for abdominal distention, abdominal pain, blood in stool, constipation, diarrhea and nausea.   Genitourinary:  Negative for dysuria, frequency and hematuria.   Musculoskeletal:  Negative for back pain, gait problem and neck pain.   Skin:  Negative for color change and rash.   Neurological:  Negative for dizziness, syncope, weakness, light-headedness, numbness and headaches.   Psychiatric/Behavioral:  Negative for behavioral problems and confusion.      Objective:     Vitals:    07/29/24 1508   BP: 121/85   BP Location: Right arm   Patient Position: Sitting   BP Method: Small (Automatic)   Pulse: 81   Resp: 18   Temp: 98.3 °F (36.8 °C)   TempSrc: Oral   SpO2: 97%   Weight: 65.3 kg (144 lb)   Height: 5' 4" (1.626 m)       Physical Exam  Vitals and nursing note reviewed.   Constitutional:       General: She is not in acute distress.     Appearance: Normal appearance. She is not diaphoretic.   HENT:      Head: Normocephalic and atraumatic.      Mouth/Throat:      Mouth: Mucous membranes are moist.      Pharynx: Oropharynx is clear.   Eyes:      Extraocular Movements: Extraocular movements intact.      Conjunctiva/sclera: Conjunctivae normal.   Cardiovascular:      Rate and Rhythm: Normal rate and regular rhythm.      " Heart sounds: Normal heart sounds.   Pulmonary:      Effort: Pulmonary effort is normal.      Breath sounds: Normal breath sounds. No stridor. No wheezing or rales.   Abdominal:      General: There is no distension.      Tenderness: There is no abdominal tenderness.   Musculoskeletal:         General: No tenderness. Normal range of motion.      Cervical back: Normal range of motion.      Right lower leg: No edema.      Left lower leg: No edema.   Lymphadenopathy:      Cervical: No cervical adenopathy.   Skin:     General: Skin is warm and dry.      Findings: No rash.   Neurological:      General: No focal deficit present.      Mental Status: She is alert and oriented to person, place, and time.   Psychiatric:         Mood and Affect: Mood and affect normal.         Behavior: Behavior normal. Behavior is cooperative.         Orders Only on 04/12/2024   Component Date Value Ref Range Status    Vit D, 25-Hydroxy 04/12/2024 24 (L)  >30 ng/mL Final   Orders Only on 04/12/2024   Component Date Value Ref Range Status    Sodium 04/12/2024 137  131 - 143 mmol/L Final    Potassium 04/12/2024 4.2  3.5 - 5.1 mmol/L Final    Chloride 04/12/2024 106  98 - 107 mmol/L Final    CO2 04/12/2024 23  21 - 32 mmol/L Final    Anion Gap 04/12/2024 8.0  3.0 - 11.0 mmol/L Final    BUN 04/12/2024 16.0  7.0 - 18.0 mg/dL Final    Creatinine 04/12/2024 0.91  0.55 - 1.02 mg/dL Final    GFR ESTIMATION 04/12/2024 > 60  >60 Final    BUN/Creatinine Ratio 04/12/2024 17.58  Ratio Final    Glucose 04/12/2024 81  74 - 106 mg/dL Final    Calcium 04/12/2024 9.4  8.5 - 10.1 mg/dL Final    Total Bilirubin 04/12/2024 0.7  0.2 - 1.0 mg/dL Final    AST 04/12/2024 13 (L)  15 - 37 U/L Final    ALT 04/12/2024 20  13 - 56 U/L Final    Total Protein 04/12/2024 7.7  6.4 - 8.2 g/dL Final    Albumin 04/12/2024 4.0  3.4 - 5.0 g/dL Final    Alkaline Phosphatase 04/12/2024 88  45 - 117 U/L Final    Cholesterol 04/12/2024 202 (H)  <200 mg/dL Final    Triglycerides  04/12/2024 53  0 - 149 mg/dL Final    HDL 04/12/2024 56  >39 mg/dL Final    LDL Cholesterol 04/12/2024 135.4  mg/dL Final    VLDL 04/12/2024 11  mg/dL Final    Free T4 04/12/2024 0.89  0.76 - 1.46 ng/dL Final    TSH 04/12/2024 3.740  0.358 - 3.74 uIU/mL Final   Orders Only on 04/12/2024   Component Date Value Ref Range Status    Estimated Avg Glucose 04/12/2024 94  mg/dL Final    Hemoglobin A1C 04/12/2024 4.8  4.2 - 6.3 % Final   Orders Only on 04/12/2024   Component Date Value Ref Range Status    WBC 04/12/2024 6.6  4.5 - 10.0 10*3/uL Final    RBC 04/12/2024 4.97  4.20 - 5.40 10*6/uL Final    Hemoglobin 04/12/2024 14.4  12.0 - 16.0 g/dL Final    Hematocrit 04/12/2024 42.5  37.0 - 47.0 % Final    MCV 04/12/2024 85.5  80.0 - 99.0 fL Final    MCH 04/12/2024 29.0  27.0 - 32.0 pg Final    MCHC 04/12/2024 33.9  32.0 - 36.0 % Final    RDW RBC Auto-Rto 04/12/2024 12.7  0.0 - 15.5 % Final    Platelets 04/12/2024 353  130 - 400 10*3/uL Final    MPV 04/12/2024 9.3  9.2 - 12.2 fL Final    Neutrophils 04/12/2024 72.5  50 - 80 % Final    Immature Granulocytes 04/12/2024 0.20  0.0 - 0.43 % Final    Lymphocytes 04/12/2024 19.8 (L)  20.0 - 45.0 % Final    Monocytes 04/12/2024 5.2  2 - 10 % Final    Eosinophils 04/12/2024 2.0  0 - 6 % Final    Basophils 04/12/2024 0.3  0 - 3 % Final    nRBC# 04/12/2024 0.0  0 - 0.2 % Final    Neutrophils Absolute 04/12/2024 4.8  1.4 - 7.0 10*3/uL Final    Immature Granulocytes Absolute 04/12/2024 0.0100  0.0 - 0.0310 thou/uL Final    Lymphocytes Absolute 04/12/2024 1.3  1.2 - 4.0 10*3/uL Final    Monocytes Absolute 04/12/2024 0.3  0.1 - 0.8 10*3/uL Final    Eosinophils Absolute 04/12/2024 0.1  0.0 - 0.6 10*3/uL Final    Basophils Absolute 04/12/2024 0.0  0.0 - 0.3 10*3/uL Final    nRBC Count Absolute 04/12/2024 0.000  0 - 0.012 thou/uL Final   Orders Only on 04/12/2024   Component Date Value Ref Range Status    Color, UA 04/12/2024 LtYellow  Yellow Final    Appearance, UA 04/12/2024 Clear  Clear  Final    pH, UA 04/12/2024 6.5  5.0 - 8.0 pH Final    Specific Gravity, UA 04/12/2024 1.017  1.005 - 1.030 Final    Protein, UA 04/12/2024 Negative  Negative mg/dL Final    Glucose, UA 04/12/2024 Negative  Negative mg/dL Final    Ketones, UA 04/12/2024 Negative  Negative mg/dL Final    Occult Blood UA 04/12/2024 Negative  Negative Final    Nitrite, UA 04/12/2024 Negative  Negative Final    Bilirubin (UA) 04/12/2024 Negative  Negative Final    Urobilinogen, UA 04/12/2024 Normal  Normal mg/dL Final    Leukocytes, UA 04/12/2024 Negative  Negative Nolan/uL Final   Clinical Support on 04/03/2024   Component Date Value Ref Range Status    Color, POC UA 04/03/2024 Yellow  Yellow, Straw, Colorless Final    Clarity, POC UA 04/03/2024 Clear  Clear Final    Glucose, POC UA 04/03/2024 Negative  Negative Final    Bilirubin, POC UA 04/03/2024 Negative  Negative Final    Ketones, POC UA 04/03/2024 Negative  Negative Final    Spec Grav POC UA 04/03/2024 1.015  1.005 - 1.030 Final    Blood, POC UA 04/03/2024 Negative  Negative Final    pH, POC UA 04/03/2024 7.0  5.0 - 8.0 Final    Protein, POC UA 04/03/2024 Negative  Negative Final    Urobilinogen, POC UA 04/03/2024 0.2  <=1.0 Final    Nitrite, POC UA 04/03/2024 Negative  Negative Final    WBC, POC UA 04/03/2024 Negative  Negative Final         Assessment:      1. Generalized anxiety disorder    2. Seasonal allergies    3. Vitamin D deficiency    4. Other hyperlipidemia    5. Anxiety          Plan:     Generalized anxiety disorder  Comments:  continue xanax as needed    Seasonal allergies    Vitamin D deficiency    Other hyperlipidemia  Comments:  Continue low cholesterol diet and exercise regularly    Anxiety  Comments:  continue xanax as needed, F/U in 2 months  Orders:  -     ALPRAZolam (XANAX) 0.25 MG tablet; Take 1 tablet (0.25 mg total) by mouth 2 (two) times daily as needed for Anxiety.  Dispense: 60 tablet; Refill: 2         Current Outpatient Medications   Medication Sig  Dispense Refill    loratadine (CLARITIN) 10 mg tablet Take 10 mg by mouth.      ALPRAZolam (XANAX) 0.25 MG tablet Take 1 tablet (0.25 mg total) by mouth 2 (two) times daily as needed for Anxiety. 60 tablet 2    clotrimazole-betamethasone 1-0.05% (LOTRISONE) cream Apply topically 2 (two) times daily. 15 g 1     No current facility-administered medications for this visit.       Medications Discontinued During This Encounter   Medication Reason    ALPRAZolam (XANAX) 0.25 MG tablet Reorder       Health Maintenance   Topic Date Due    TETANUS VACCINE  06/28/2031    Hepatitis C Screening  Completed    Lipid Panel  Completed       Patient Instructions   RTC in 6 months for F/U or sooner if needed.    Patient Education       Anxiety Discharge Instructions, Adult   About this topic   Anxiety can cause you to feel very worried. It can also cause physical symptoms like chest pain, stomach aches, or trouble sleeping. While mild anxiety is a normal response to stress, it can cause you problems in your everyday life. You may need follow-up care to help manage your anxiety. Anxiety happens in many forms, like:  Being scared all the time that something bad is going to happen. This is generalized anxiety.  Strong bursts of fear where your body has signs that may feel like a heart attack. This is called a panic attack.  Upsetting thoughts that happen often. There is a need to repeat doing certain things to help get rid of the anxiety caused by these thoughts. The thoughts or actions may be about checking on things, touching things, or worry about germs. This is an obsessive-compulsive disorder.  Strong fear of an object, place, or condition. This is a phobia.  Fear that others think bad things about you or being put down by other people. This is social anxiety.  Nightmares, flashbacks, staying away from people, or having panic attacks when reminded of a shocking or hurtful time or place from the past. This is post-traumatic  stress.  Anxiety disorder may be treated in many ways. Some kinds of treatment have you talk about your beliefs, fears, and worries. You may learn how certain thoughts or feelings can raise anxiety. You may also learn what steps to take to lower anxiety. Other kinds of treatment may have you look back on a hurtful event, sad memory, or something you are afraid of. The doctor will help you deal with the feelings that you may have. You may learn ways to cope with unwanted events or thoughts by looking at your fears in a way that feels safe.  What care is needed at home?   Ask your doctor what you need to do when you go home. Make sure you ask questions if you do not understand what the doctor says.  Set a time to talk with a counselor about your worries and feelings. This can help you with your anxiety.  Take care to follow all instructions when you take your medicines.  Limit alcohol and caffeine.  Learn ways to manage stress. Relaxation methods like reflection, deep breathing, and muscle relaxation may be helpful. Things like yoga, exercise, and maya chi are also good.  Talk about your feelings with family members and friends you trust. Talk to someone who can help you see how your thoughts at certain times may raise your anxiety.     What follow-up care is needed?   Your doctor may ask you to make visits to the office to check on your progress. Be sure to keep these visits.  What drugs may be needed?   The doctor may order drugs to help the physical signs of anxiety. Make sure that you take the drugs as taught to you by the doctor. Talk with your doctor about any side effects and ask how long you should take the drug.  Will physical activity be limited?   You may take part in physical activities. Some people are limited because of their anxiety or fear. Talk with your doctor about the right amount of activity for you.  What changes to diet are needed?   Eat a variety of healthy foods and limit drinks with caffeine.  You should avoid alcohol, energy drinks, and over-the-counter stimulants.  What problems could happen?   If your anxiety is not treated, it can result in:  Staying away from work or social events  Not being able to do everyday tasks  Keeping away from family and friends  What can be done to prevent this health problem?   Learn what events, people, or things upset you. Limit your contact with them.  Talk about your feelings. Talk to someone who can help you see how your thoughts at certain times may raise your anxiety.  Seek support from your friends and family. Find someone who calms you down. Ask if you can call them when you are getting anxious.  When do I need to call the doctor?   You feel you may harm yourself or someone else.  You can also call a mental health hotline for help.  You have any physical symptoms, such as chest pain, trouble breathing, or severe belly pain, that could be a sign of a serious problem.  If you are short of breath.  If you do not feel like you can be alone.  Teach Back: Helping You Understand   The Teach Back Method helps you understand the information we are giving you. After you talk with the staff, tell them in your own words what you learned. This helps to make sure the staff has described each thing clearly. It also helps to explain things that may have been confusing. Before going home, make sure you can do these:  I can tell you about my condition and the drugs I need to take.  I can tell you what may help lower my anxiety.  I can tell you what I will do if it is hard to breathe or I have chest pain.  I can tell you what I will do if I do not feel safe or cannot be alone.  Where can I learn more?   [facility]  https://www.chrissy.org/Learn-More/Mental-Health-Conditions/Anxiety-Disorders   National Health Service  https://www.nhs.uk/conditions/generalised-anxiety-disorder/symptoms/   National [place] of Health ? Senior  Health  https://www.renny.nih.gov/health/relieving-stress-anxiety-lryonyzlk-kammymsvgk-eitnjitwnc   National [place] of Mental Health  http://www.nimh.nih.gov/health/publications/anxiety-disorders/complete-index.shtml   Last Reviewed Date   2021-06-08  Consumer Information Use and Disclaimer   This information is not specific medical advice and does not replace information you receive from your health care provider. This is only a brief summary of general information. It does NOT include all information about conditions, illnesses, injuries, tests, procedures, treatments, therapies, discharge instructions or life-style choices that may apply to you. You must talk with your health care provider for complete information about your health and treatment options. This information should not be used to decide whether or not to accept your health care providers advice, instructions or recommendations. Only your health care provider has the knowledge and training to provide advice that is right for you.  Copyright   Copyright © 2021 [employer] and its affiliates and/or licensors. All rights reserved.    Patient Education       Seasonal Allergies Discharge Instructions   About this topic   Seasonal allergies are also called allergic rhinitis or hay fever. You may have sneezing; a stuffy or runny nose; or itchy throat, ears, or eyes. You may feel very tired. Most often, this problem is caused by:  Pollens from trees, grasses, or weeds.  Mold spores that grow when the air is humid, wet, or damp.  Some people can breathe in these things and have no problems. But when you have a seasonal allergy, your body acts as if the substance is harming you. Then you have symptoms. You may have symptoms only at some times of the year. If your symptoms last all year, they may be caused by:  Insects, such as dust mites or cock roaches.  Animals, such as cats or dogs.  Mold spores.     What care is needed at home?   Ask your doctor what  you need to do when you go home. Make sure you ask questions if you do not understand what the doctor says.  Rinse your nose with salt water to get rid of pollen inside of your nose.  Keep the windows closed and use air conditioning.  Shower before bed to rinse pollen from your hair and skin.  Wear a dust mask if you need to be outside.  Use over-the-counter medicines to help with your symptoms:  A steroid nose spray can help with a stuffy nose. It can also help with drainage down the back of your throat.  An antihistamine can help with itching, sneezing, or runny nose.  An antihistamine eye drop can help with itchy eyes.  A decongestant can help with a stuffy nose. Talk with your doctor or pharmacist about your other health problems before you use this kind of medicine. It may not be safe for people with high blood pressure.  What follow-up care is needed?   Your doctor may ask you to make visits to the office to check on your progress. Your doctor may ask you to see an allergy specialist, or to have testing done to learn what is causing your allergies. Be sure to keep these visits.  What drugs may be needed?   The doctor may order drugs to treat the signs. Take your drugs as ordered. You may also take allergy shots if you have had allergy tests.  Will physical activity be limited?   You may have to limit your activity. If your health problem is caused by an allergy to pollens or molds, you may want to limit your time outdoors. Talk to your doctor about what activities are best for you.  What problems could happen?   Your signs may not get better with your drugs  Asthma may get worse  Sinus infection  What can be done to prevent this health problem?   Try to avoid allergens.  If you are allergic to pollens, grasses, trees, etc:  Stay inside in the morning when pollen counts are high.  Avoid going outside when the trees, grasses, or weeds are blooming.  Keep the windows of your house and car closed.  Use an air  conditioner.  If you are allergic to dust, molds, dust mites, pets, etc:  Clean your air conditioner or furnace filters regularly.  Cover pillows and mattresses with vinyl covers to lower your exposure to dust mites.  Wash bedding weekly in very hot water.  Use fewer dust-collecting items, such as curtains, bed skirts, carpeting, and stuffed animals, mainly in your bedroom.  If you can't avoid having a furry pet, vacuum often and keep your pet out of bedrooms and other rooms with carpets.  When do I need to call the doctor?   You are having so much trouble breathing that you can only say one or two words at a time.  You need to sit upright at all times to be able to breathe and or cannot lie down.  You have severe swelling of your tongue, lips, or mouth.  You have trouble breathing when talking or sitting still.  You have a fever of 100.4°F (38°C) or higher.  You have green or yellow mucus.  Teach Back: Helping You Understand   The Teach Back Method helps you understand the information we are giving you. After you talk with the staff, tell them in your own words what you learned. This helps to make sure the staff has described each thing clearly. It also helps to explain things that may have been confusing. Before going home, make sure you can do these:  I can tell you about my condition.  I can tell you what may help make the air .  I can tell you what may help ease my allergies.  Where can I learn more?   Food and Drug Administration  https://www.fda.gov/ForConsumers/ConsumerUpdates/obw865271.htm   NHS Choices  https://www.nhs.uk/conditions/Hay-fever/   Last Reviewed Date   2021-06-21  Consumer Information Use and Disclaimer   This information is not specific medical advice and does not replace information you receive from your health care provider. This is only a brief summary of general information. It does NOT include all information about conditions, illnesses, injuries, tests, procedures, treatments,  "therapies, discharge instructions or life-style choices that may apply to you. You must talk with your health care provider for complete information about your health and treatment options. This information should not be used to decide whether or not to accept your health care providers advice, instructions or recommendations. Only your health care provider has the knowledge and training to provide advice that is right for you.  Copyright   Copyright © 2021 [employer] and its affiliates and/or licensors. All rights reserved.    Patient Education       Low Cholesterol, Saturated Fat, and Trans Fat Diet   About this topic   Cholesterol, saturated fat, and trans fat are in many foods. These may raise your blood cholesterol levels. If your cholesterol is too high, this can cause health problems in your heart, liver, kidneys, and even your eyes. The key to lowering your risk of heart problems is to lower your bad fat intake.  Saturated fats and trans fats are the bad fats. These fats clog your arteries and raise your bad cholesterol. Saturated fats and trans fats are solid fats at room temperature. Saturated fats are animal fats. Trans fats are manmade fats. They add flavor to a lot of packaged foods. Staying away from saturated and trans fats will help your heart.  When you do eat foods with fat, make sure they have the good fats. Monounsaturated and polyunsaturated fats are good fats. These fats help raise your good cholesterol and protect your heart.  General   How to Lower Fat and Cholesterol in Your Diet   Read the labels of the foods you buy from the market to find out how much fat is present. Under 5% of total fat on a label means it is "low fat". Over 20% of total fat on a label means it is high fat.  Eat high fiber foods, like soluble fiber. This type of fiber helps lower cholesterol in the body. Choose oatmeal, fruits (like apples), beans, and nuts to get the most soluble fiber.  Eat foods high in " omega-3 fatty acids like karla seeds, walnuts, salmon, tuna, trout, herring, flaxseed, and soybeans. These foods help keep the heart healthy.  Limit your bad fat and oil intake.  Stay away from butter, stick margarine, shortening, lard, and palm and coconut oil. Pick plant-based spreads instead.  Limit mayonnaise, salad dressings, gravies, and sauces, unless it is made from low-fat ingredients.  Limit chocolate.  Do not eat high-fat processed foods like hot dogs, jones, sausage, ham and other luncheon meats high in fat, and some frozen foods. Pick fish, chicken, turkey, and lean meats instead.  Eat more dried beans, lentils, and tofu to get your protein.  Do not eat organ meats, like liver.  Choose nonfat or low-fat milk, yogurt, and cheese.  Use light or fat-free cream cheese and sour cream.  Eat lots of fruits and vegetables.  Pick whole grain breads, cereals, pastas, and rice.  Do not eat snacks that are high in fats like granola, cookies, pies, pastries, doughnuts, and croissants.  Stay away from deep fried foods.  Help When Cooking   Remove the fat portion of meats and the skin from poultry before cooking.  Bake, broil, grill, poach, or roast poultry, fish, and lean meats.  Drain and throw away the fat that drains out of meat as you cook it.  Try to add little or no fat to foods.  Use olive or canola oil for cooking or baking.  Steam your vegetables.  Use herbs or no-oil marinades to flavor foods.         Who should use this diet?   This diet is for people who are at high risk of getting health problems like heart disease, high blood pressure, diabetes, and others. This diet is also good for all people to follow to keep your heart healthy.  What foods are good to eat?   Foods with good fats are:  Canola, peanut, and olive oil  Safflower, soybean, and corn oil  Walnuts, almonds, cashews, and peanuts  Pumpkin and sunflower seeds  [place] and tuna  Tofu  Soymilk  Avocado  What foods should be limited or avoided?    Stay away from these types of foods that have saturated fats:  Whole fat dairy products like cheese, ice cream, whole milk, and cream  Palm and coconut oils  High-fat meats like beef, lamb, poultry with the skin, jones, and sausage  Butter and lard  Stay away from these types of foods that may have trans fat:  Cookies, cakes, candy, doughnuts, baked goods, muffins, pizza dough, and pie crusts that are packaged  Fried foods  Frozen dinners  Chips and crackers  Microwave popcorn  Stick margarine and vegetable shortenings  Helpful tips   To help stay away from saturated fat:  Pick lean cuts of meat  Take the skin off chicken and turkey or pick skinless  Pick low-fat cheese, milk, and ice cream  Use liquid oils when cooking and baking, such as olive oil and canola oil  To help stay away from trans fat:  Look at your labels. Choose foods with 0% trans fat. Read the ingredient list. Avoid foods with partially hydrogenated oil in the ingredient list. This means there is trans fat in the product.  Where can I learn more?   American Heart Association   http://www.heart.org/HEARTORG/Conditions/Cholesterol/PreventionTreatmentofHighCholesterol/Know-Your-Fats_[facility]_305628_Article.jsp   Last Reviewed Date   2021-10-05  Consumer Information Use and Disclaimer   This information is not specific medical advice and does not replace information you receive from your health care provider. This is only a brief summary of general information. It does NOT include all information about conditions, illnesses, injuries, tests, procedures, treatments, therapies, discharge instructions or life-style choices that may apply to you. You must talk with your health care provider for complete information about your health and treatment options. This information should not be used to decide whether or not to accept your health care providers advice, instructions or recommendations. Only your health care provider has the knowledge and training to  provide advice that is right for you.   Copyright   Copyright © 2021 UpToDate, Inc. and its affiliates and/or licensors. All rights reserved.          Risks, benefits, and alternatives discussed with patient, Patient verbalized understanding of discussed plan of care. Asked patient if any further questions, answered no.    Future Appointments   Date Time Provider Department Center   1/29/2025  2:40 PM Silviano Campbell, NP [id number]              Silviano Campbell NP

## 2024-07-29 NOTE — PATIENT INSTRUCTIONS
RTC in 6 months for F/U or sooner if needed.    Patient Education       Anxiety Discharge Instructions, Adult   About this topic   Anxiety can cause you to feel very worried. It can also cause physical symptoms like chest pain, stomach aches, or trouble sleeping. While mild anxiety is a normal response to stress, it can cause you problems in your everyday life. You may need follow-up care to help manage your anxiety. Anxiety happens in many forms, like:  Being scared all the time that something bad is going to happen. This is generalized anxiety.  Strong bursts of fear where your body has signs that may feel like a heart attack. This is called a panic attack.  Upsetting thoughts that happen often. There is a need to repeat doing certain things to help get rid of the anxiety caused by these thoughts. The thoughts or actions may be about checking on things, touching things, or worry about germs. This is an obsessive-compulsive disorder.  Strong fear of an object, place, or condition. This is a phobia.  Fear that others think bad things about you or being put down by other people. This is social anxiety.  Nightmares, flashbacks, staying away from people, or having panic attacks when reminded of a shocking or hurtful time or place from the past. This is post-traumatic stress.  Anxiety disorder may be treated in many ways. Some kinds of treatment have you talk about your beliefs, fears, and worries. You may learn how certain thoughts or feelings can raise anxiety. You may also learn what steps to take to lower anxiety. Other kinds of treatment may have you look back on a hurtful event, sad memory, or something you are afraid of. The doctor will help you deal with the feelings that you may have. You may learn ways to cope with unwanted events or thoughts by looking at your fears in a way that feels safe.  What care is needed at home?   Ask your doctor what you need to do when you go home. Make sure you ask questions if  you do not understand what the doctor says.  Set a time to talk with a counselor about your worries and feelings. This can help you with your anxiety.  Take care to follow all instructions when you take your medicines.  Limit alcohol and caffeine.  Learn ways to manage stress. Relaxation methods like reflection, deep breathing, and muscle relaxation may be helpful. Things like yoga, exercise, and maya chi are also good.  Talk about your feelings with family members and friends you trust. Talk to someone who can help you see how your thoughts at certain times may raise your anxiety.     What follow-up care is needed?   Your doctor may ask you to make visits to the office to check on your progress. Be sure to keep these visits.  What drugs may be needed?   The doctor may order drugs to help the physical signs of anxiety. Make sure that you take the drugs as taught to you by the doctor. Talk with your doctor about any side effects and ask how long you should take the drug.  Will physical activity be limited?   You may take part in physical activities. Some people are limited because of their anxiety or fear. Talk with your doctor about the right amount of activity for you.  What changes to diet are needed?   Eat a variety of healthy foods and limit drinks with caffeine. You should avoid alcohol, energy drinks, and over-the-counter stimulants.  What problems could happen?   If your anxiety is not treated, it can result in:  Staying away from work or social events  Not being able to do everyday tasks  Keeping away from family and friends  What can be done to prevent this health problem?   Learn what events, people, or things upset you. Limit your contact with them.  Talk about your feelings. Talk to someone who can help you see how your thoughts at certain times may raise your anxiety.  Seek support from your friends and family. Find someone who calms you down. Ask if you can call them when you are getting anxious.  When  do I need to call the doctor?   You feel you may harm yourself or someone else.  You can also call a mental health hotline for help.  You have any physical symptoms, such as chest pain, trouble breathing, or severe belly pain, that could be a sign of a serious problem.  If you are short of breath.  If you do not feel like you can be alone.  Teach Back: Helping You Understand   The Teach Back Method helps you understand the information we are giving you. After you talk with the staff, tell them in your own words what you learned. This helps to make sure the staff has described each thing clearly. It also helps to explain things that may have been confusing. Before going home, make sure you can do these:  I can tell you about my condition and the drugs I need to take.  I can tell you what may help lower my anxiety.  I can tell you what I will do if it is hard to breathe or I have chest pain.  I can tell you what I will do if I do not feel safe or cannot be alone.  Where can I learn more?   [employer]  https://www.chrissy.org/Learn-More/Mental-Health-Conditions/Anxiety-Disorders   National Health Service  https://www.nhs.uk/conditions/generalised-anxiety-disorder/symptoms/   [place] [facility] ? [facility]  https://www.renny.nih.gov/health/relieving-stress-anxiety-ddhiwkcxf-zwspayytqy-ifncxxkzjw   [place] [employer] [facility] Health  http://www.nimh.nih.gov/health/publications/anxiety-disorders/complete-index.shtml   Last Reviewed Date   2021-06-08  Consumer Information Use and Disclaimer   This information is not specific medical advice and does not replace information you receive from your health care provider. This is only a brief summary of general information. It does NOT include all information about conditions, illnesses, injuries, tests, procedures, treatments, therapies, discharge instructions or life-style choices that may apply to you. You must talk with your health care provider for complete information  about your health and treatment options. This information should not be used to decide whether or not to accept your health care providers advice, instructions or recommendations. Only your health care provider has the knowledge and training to provide advice that is right for you.  Copyright   Copyright © 2021 UpToDate, Inc. and its affiliates and/or licensors. All rights reserved.    Patient Education       Seasonal Allergies Discharge Instructions   About this topic   Seasonal allergies are also called allergic rhinitis or hay fever. You may have sneezing; a stuffy or runny nose; or itchy throat, ears, or eyes. You may feel very tired. Most often, this problem is caused by:  Pollens from trees, grasses, or weeds.  Mold spores that grow when the air is humid, wet, or damp.  Some people can breathe in these things and have no problems. But when you have a seasonal allergy, your body acts as if the substance is harming you. Then you have symptoms. You may have symptoms only at some times of the year. If your symptoms last all year, they may be caused by:  Insects, such as dust mites or cock roaches.  Animals, such as cats or dogs.  Mold spores.     What care is needed at home?   Ask your doctor what you need to do when you go home. Make sure you ask questions if you do not understand what the doctor says.  Rinse your nose with salt water to get rid of pollen inside of your nose.  Keep the windows closed and use air conditioning.  Shower before bed to rinse pollen from your hair and skin.  Wear a dust mask if you need to be outside.  Use over-the-counter medicines to help with your symptoms:  A steroid nose spray can help with a stuffy nose. It can also help with drainage down the back of your throat.  An antihistamine can help with itching, sneezing, or runny nose.  An antihistamine eye drop can help with itchy eyes.  A decongestant can help with a stuffy nose. Talk with your doctor or pharmacist about your other  health problems before you use this kind of medicine. It may not be safe for people with high blood pressure.  What follow-up care is needed?   Your doctor may ask you to make visits to the office to check on your progress. Your doctor may ask you to see an allergy specialist, or to have testing done to learn what is causing your allergies. Be sure to keep these visits.  What drugs may be needed?   The doctor may order drugs to treat the signs. Take your drugs as ordered. You may also take allergy shots if you have had allergy tests.  Will physical activity be limited?   You may have to limit your activity. If your health problem is caused by an allergy to pollens or molds, you may want to limit your time outdoors. Talk to your doctor about what activities are best for you.  What problems could happen?   Your signs may not get better with your drugs  Asthma may get worse  Sinus infection  What can be done to prevent this health problem?   Try to avoid allergens.  If you are allergic to pollens, grasses, trees, etc:  Stay inside in the morning when pollen counts are high.  Avoid going outside when the trees, grasses, or weeds are blooming.  Keep the windows of your house and car closed.  Use an air conditioner.  If you are allergic to dust, molds, dust mites, pets, etc:  Clean your air conditioner or furnace filters regularly.  Cover pillows and mattresses with vinyl covers to lower your exposure to dust mites.  Wash bedding weekly in very hot water.  Use fewer dust-collecting items, such as curtains, bed skirts, carpeting, and stuffed animals, mainly in your bedroom.  If you can't avoid having a furry pet, vacuum often and keep your pet out of bedrooms and other rooms with carpets.  When do I need to call the doctor?   You are having so much trouble breathing that you can only say one or two words at a time.  You need to sit upright at all times to be able to breathe and or cannot lie down.  You have severe swelling  of your tongue, lips, or mouth.  You have trouble breathing when talking or sitting still.  You have a fever of 100.4°F (38°C) or higher.  You have green or yellow mucus.  Teach Back: Helping You Understand   The Teach Back Method helps you understand the information we are giving you. After you talk with the staff, tell them in your own words what you learned. This helps to make sure the staff has described each thing clearly. It also helps to explain things that may have been confusing. Before going home, make sure you can do these:  I can tell you about my condition.  I can tell you what may help make the air .  I can tell you what may help ease my allergies.  Where can I learn more?   Food and Drug Administration  https://www.fda.gov/ForConsumers/ConsumerUpdates/ywo044914.htm   NHS Choices  https://www.nhs.uk/conditions/Hay-fever/   Last Reviewed Date   2021-06-21  Consumer Information Use and Disclaimer   This information is not specific medical advice and does not replace information you receive from your health care provider. This is only a brief summary of general information. It does NOT include all information about conditions, illnesses, injuries, tests, procedures, treatments, therapies, discharge instructions or life-style choices that may apply to you. You must talk with your health care provider for complete information about your health and treatment options. This information should not be used to decide whether or not to accept your health care providers advice, instructions or recommendations. Only your health care provider has the knowledge and training to provide advice that is right for you.  Copyright   Copyright © 2021 UpToDate, Inc. and its affiliates and/or licensors. All rights reserved.    Patient Education       Low Cholesterol, Saturated Fat, and Trans Fat Diet   About this topic   Cholesterol, saturated fat, and trans fat are in many foods. These may raise your blood cholesterol  "levels. If your cholesterol is too high, this can cause health problems in your heart, liver, kidneys, and even your eyes. The key to lowering your risk of heart problems is to lower your bad fat intake.  Saturated fats and trans fats are the bad fats. These fats clog your arteries and raise your bad cholesterol. Saturated fats and trans fats are solid fats at room temperature. Saturated fats are animal fats. Trans fats are manmade fats. They add flavor to a lot of packaged foods. Staying away from saturated and trans fats will help your heart.  When you do eat foods with fat, make sure they have the good fats. Monounsaturated and polyunsaturated fats are good fats. These fats help raise your good cholesterol and protect your heart.  General   How to Lower Fat and Cholesterol in Your Diet   Read the labels of the foods you buy from the market to find out how much fat is present. Under 5% of total fat on a label means it is "low fat". Over 20% of total fat on a label means it is high fat.  Eat high fiber foods, like soluble fiber. This type of fiber helps lower cholesterol in the body. Choose oatmeal, fruits (like apples), beans, and nuts to get the most soluble fiber.  Eat foods high in omega-3 fatty acids like karla seeds, walnuts, salmon, tuna, trout, herring, flaxseed, and soybeans. These foods help keep the heart healthy.  Limit your bad fat and oil intake.  Stay away from butter, stick margarine, shortening, lard, and palm and coconut oil. Pick plant-based spreads instead.  Limit mayonnaise, salad dressings, gravies, and sauces, unless it is made from low-fat ingredients.  Limit chocolate.  Do not eat high-fat processed foods like hot dogs, jones, sausage, ham and other luncheon meats high in fat, and some frozen foods. Pick fish, chicken, turkey, and lean meats instead.  Eat more dried beans, lentils, and tofu to get your protein.  Do not eat organ meats, like liver.  Choose nonfat or low-fat milk, yogurt, " and cheese.  Use light or fat-free cream cheese and sour cream.  Eat lots of fruits and vegetables.  Pick whole grain breads, cereals, pastas, and rice.  Do not eat snacks that are high in fats like granola, cookies, pies, pastries, doughnuts, and croissants.  Stay away from deep fried foods.  Help When Cooking   Remove the fat portion of meats and the skin from poultry before cooking.  Bake, broil, grill, poach, or roast poultry, fish, and lean meats.  Drain and throw away the fat that drains out of meat as you cook it.  Try to add little or no fat to foods.  Use olive or canola oil for cooking or baking.  Steam your vegetables.  Use herbs or no-oil marinades to flavor foods.         Who should use this diet?   This diet is for people who are at high risk of getting health problems like heart disease, high blood pressure, diabetes, and others. This diet is also good for all people to follow to keep your heart healthy.  What foods are good to eat?   Foods with good fats are:  Canola, peanut, and olive oil  Safflower, soybean, and corn oil  Walnuts, almonds, cashews, and peanuts  Pumpkin and sunflower seeds  [place] and tuna  Tofu  Soymilk  Avocado  What foods should be limited or avoided?   Stay away from these types of foods that have saturated fats:  Whole fat dairy products like cheese, ice cream, whole milk, and cream  Palm and coconut oils  High-fat meats like beef, lamb, poultry with the skin, jones, and sausage  Butter and lard  Stay away from these types of foods that may have trans fat:  Cookies, cakes, candy, doughnuts, baked goods, muffins, pizza dough, and pie crusts that are packaged  Fried foods  Frozen dinners  Chips and crackers  Microwave popcorn  Stick margarine and vegetable shortenings  Helpful tips   To help stay away from saturated fat:  Pick lean cuts of meat  Take the skin off chicken and turkey or pick skinless  Pick low-fat cheese, milk, and ice cream  Use liquid oils when cooking and  baking, such as olive oil and canola oil  To help stay away from trans fat:  Look at your labels. Choose foods with 0% trans fat. Read the ingredient list. Avoid foods with partially hydrogenated oil in the ingredient list. This means there is trans fat in the product.  Where can I learn more?   American Heart Association   http://www.heart.org/HEARTORG/Conditions/Cholesterol/PreventionTreatmentofHighCholesterol/Know-Your-Fats_[facility]_305628_Article.jsp   Last Reviewed Date   2021-10-05  Consumer Information Use and Disclaimer   This information is not specific medical advice and does not replace information you receive from your health care provider. This is only a brief summary of general information. It does NOT include all information about conditions, illnesses, injuries, tests, procedures, treatments, therapies, discharge instructions or life-style choices that may apply to you. You must talk with your health care provider for complete information about your health and treatment options. This information should not be used to decide whether or not to accept your health care providers advice, instructions or recommendations. Only your health care provider has the knowledge and training to provide advice that is right for you.   Copyright   Copyright © 2021 UpToDate, Inc. and its affiliates and/or licensors. All rights reserved.

## 2024-08-01 ENCOUNTER — PATIENT MESSAGE (OUTPATIENT)
Dept: PRIMARY CARE CLINIC | Facility: CLINIC | Age: 39
End: 2024-08-01
Payer: MEDICAID

## 2024-08-06 ENCOUNTER — OFFICE VISIT (OUTPATIENT)
Dept: OBSTETRICS AND GYNECOLOGY | Facility: CLINIC | Age: 39
End: 2024-08-06
Payer: MEDICAID

## 2024-08-06 VITALS
WEIGHT: 143 LBS | SYSTOLIC BLOOD PRESSURE: 150 MMHG | DIASTOLIC BLOOD PRESSURE: 89 MMHG | HEART RATE: 89 BPM | BODY MASS INDEX: 24.55 KG/M2

## 2024-08-06 DIAGNOSIS — N76.0 VULVOVAGINITIS: Primary | ICD-10-CM

## 2024-08-06 PROCEDURE — 3044F HG A1C LEVEL LT 7.0%: CPT | Mod: CPTII,,, | Performed by: OBSTETRICS & GYNECOLOGY

## 2024-08-06 PROCEDURE — 3008F BODY MASS INDEX DOCD: CPT | Mod: CPTII,,, | Performed by: OBSTETRICS & GYNECOLOGY

## 2024-08-06 PROCEDURE — 99213 OFFICE O/P EST LOW 20 MIN: CPT | Mod: ,,, | Performed by: OBSTETRICS & GYNECOLOGY

## 2024-08-06 PROCEDURE — 3079F DIAST BP 80-89 MM HG: CPT | Mod: CPTII,,, | Performed by: OBSTETRICS & GYNECOLOGY

## 2024-08-06 PROCEDURE — 3077F SYST BP >= 140 MM HG: CPT | Mod: CPTII,,, | Performed by: OBSTETRICS & GYNECOLOGY

## 2024-08-06 RX ORDER — CLOTRIMAZOLE AND BETAMETHASONE DIPROPIONATE 10; .64 MG/G; MG/G
CREAM TOPICAL 2 TIMES DAILY
Qty: 15 G | Refills: 1 | Status: SHIPPED | OUTPATIENT
Start: 2024-08-06

## 2024-09-30 ENCOUNTER — PATIENT MESSAGE (OUTPATIENT)
Dept: PRIMARY CARE CLINIC | Facility: CLINIC | Age: 39
End: 2024-09-30
Payer: MEDICAID

## 2024-10-22 ENCOUNTER — PATIENT MESSAGE (OUTPATIENT)
Dept: PRIMARY CARE CLINIC | Facility: CLINIC | Age: 39
End: 2024-10-22
Payer: MEDICAID

## 2024-10-22 DIAGNOSIS — F41.9 ANXIETY: ICD-10-CM

## 2024-10-22 RX ORDER — ALPRAZOLAM 0.25 MG/1
0.25 TABLET ORAL 2 TIMES DAILY PRN
Qty: 60 TABLET | Refills: 2 | Status: SHIPPED | OUTPATIENT
Start: 2024-10-24

## 2024-12-18 ENCOUNTER — PATIENT MESSAGE (OUTPATIENT)
Dept: PRIMARY CARE CLINIC | Facility: CLINIC | Age: 39
End: 2024-12-18
Payer: MEDICAID

## 2025-01-02 ENCOUNTER — PATIENT MESSAGE (OUTPATIENT)
Dept: PRIMARY CARE CLINIC | Facility: CLINIC | Age: 40
End: 2025-01-02
Payer: MEDICAID

## 2025-01-02 DIAGNOSIS — N61.0 ACUTE MASTITIS: Primary | ICD-10-CM

## 2025-01-02 RX ORDER — CEPHALEXIN 500 MG/1
500 CAPSULE ORAL EVERY 12 HOURS
Qty: 14 CAPSULE | Refills: 0 | Status: SHIPPED | OUTPATIENT
Start: 2025-01-02 | End: 2025-01-09

## 2025-01-02 NOTE — TELEPHONE ENCOUNTER
Can u get a clogged duct after 3 months of stopped breast feeding I am having pain under my arm to my nipple for like 5 days

## 2025-01-02 NOTE — TELEPHONE ENCOUNTER
Please let patient know she could have a clogged duct so I just ordered antibiotics to her pharmacy to help just in case but to let me know if it does not improve after taking the antibiotics because we may have to do an ultrasound of her breast then to take a closer look. Thanks.

## 2025-01-10 ENCOUNTER — PATIENT MESSAGE (OUTPATIENT)
Dept: PRIMARY CARE CLINIC | Facility: CLINIC | Age: 40
End: 2025-01-10
Payer: MEDICAID

## 2025-01-13 ENCOUNTER — PATIENT MESSAGE (OUTPATIENT)
Dept: PRIMARY CARE CLINIC | Facility: CLINIC | Age: 40
End: 2025-01-13
Payer: MEDICAID

## 2025-01-13 ENCOUNTER — CLINICAL SUPPORT (OUTPATIENT)
Dept: OBSTETRICS AND GYNECOLOGY | Facility: CLINIC | Age: 40
End: 2025-01-13
Payer: MEDICAID

## 2025-01-13 DIAGNOSIS — F41.9 ANXIETY: ICD-10-CM

## 2025-01-13 DIAGNOSIS — Z00.00 ANNUAL PHYSICAL EXAM: ICD-10-CM

## 2025-01-13 DIAGNOSIS — Z02.83 ENCOUNTER FOR DRUG SCREENING: Primary | ICD-10-CM

## 2025-01-13 DIAGNOSIS — Z01.89 ROUTINE LAB DRAW: Primary | ICD-10-CM

## 2025-01-13 DIAGNOSIS — E78.49 OTHER HYPERLIPIDEMIA: ICD-10-CM

## 2025-01-13 DIAGNOSIS — R35.0 URINE FREQUENCY: ICD-10-CM

## 2025-01-13 RX ORDER — ALPRAZOLAM 0.25 MG/1
0.25 TABLET ORAL 2 TIMES DAILY PRN
Qty: 60 TABLET | Refills: 2 | OUTPATIENT
Start: 2025-01-13

## 2025-01-13 NOTE — TELEPHONE ENCOUNTER
My boob doesnt hurt its just every once in a while on my nipple should I set up an appointment for a scan how do I go about that ? She said I should set a appt up if it still hurt my cell is [phone]

## 2025-01-14 LAB
ALBUMIN SERPL BCP-MCNC: 4.4 G/DL (ref 3.4–5)
ALBUMIN/GLOBULIN RATIO: 1.3 RATIO (ref 1.1–1.8)
ALP SERPL-CCNC: 75 U/L (ref 45–117)
ALT SERPL W P-5'-P-CCNC: 20 U/L (ref 13–56)
ANION GAP SERPL CALC-SCNC: 6 MMOL/L (ref 3–11)
APPEARANCE, UA: CLEAR
AST SERPL-CCNC: 14 U/L (ref 15–37)
BARBITURATE SCREEN URINE: NEGATIVE
BASOPHILS NFR BLD: 0.3 % (ref 0–3)
BENZODIAZ UR QL SCN: POSITIVE
BENZOYLECGONINE, URINE: NEGATIVE
BILIRUB SERPL-MCNC: 0.6 MG/DL (ref 0.2–1)
BILIRUB UR QL STRIP: NEGATIVE MG/DL
BUN SERPL-MCNC: 15 MG/DL (ref 7–18)
BUN/CREAT SERPL: 18.51 RATIO
CALCIUM SERPL-MCNC: 9.4 MG/DL (ref 8.5–10.1)
CANNABINOID SCREEN URINE: NEGATIVE
CHLORIDE SERPL-SCNC: 104 MMOL/L (ref 98–107)
CHOLEST SERPL-MSCNC: 213 MG/DL
CLARITHRO TITR SBT: NEGATIVE {TITER}
CO2 SERPL-SCNC: 25 MMOL/L (ref 21–32)
COLOR UR: COLORLESS
CREAT SERPL-MCNC: 0.81 MG/DL (ref 0.55–1.02)
EOSINOPHIL NFR BLD: 2.3 % (ref 0–6)
ERYTHROCYTE [DISTWIDTH] IN BLOOD BY AUTOMATED COUNT: 12.6 % (ref 0–15.5)
GFR ESTIMATION: 96
GLOBULIN: 3.5 G/DL (ref 2.3–3.5)
GLUCOSE (UA): NEGATIVE MG/DL
GLUCOSE SERPL-MCNC: 81 MG/DL (ref 74–106)
HCT VFR BLD AUTO: 42.1 % (ref 37–47)
HDL/CHOLESTEROL RATIO: 3.6 RATIO
HDLC SERPL-MCNC: 59 MG/DL
HGB BLD-MCNC: 13.7 G/DL (ref 12–16)
HGB UR QL STRIP: NEGATIVE /UL
KETONES UR QL STRIP: NEGATIVE MG/DL
LDLC SERPL CALC-MCNC: 136.8 MG/DL
LEUKOCYTE ESTERASE UR QL STRIP: NEGATIVE /UL
LYMPHOCYTES NFR BLD: 26.5 % (ref 20–45)
MCH RBC QN AUTO: 28.3 PG (ref 27–32)
MCHC RBC AUTO-ENTMCNC: 32.5 % (ref 32–36)
MCV RBC AUTO: 87 FL (ref 80–99)
METHADONE UR QL SCN: NEGATIVE
MONOCYTES NFR BLD: 4.4 % (ref 2–10)
NEUTROPHILS # BLD AUTO: 4.1 10*3/UL (ref 1.4–7)
NEUTROPHILS NFR BLD: 66.2 % (ref 50–80)
NITRITE UR QL STRIP: NEGATIVE
NUCLEATED RED BLOOD CELLS: 0 % (ref 0–0.2)
OPIATES UR QL SCN: NEGATIVE
PCP UR QL SCN: NEGATIVE
PH UR STRIP: 6 PH (ref 5–8)
PH, URINE (TOX): 6 (ref 5–8)
PLATELETS: 355 10*3/UL (ref 130–400)
POTASSIUM SERPL-SCNC: 3.5 MMOL/L (ref 3.5–5.1)
PROT SERPL-MCNC: 7.9 G/DL (ref 6.4–8.2)
PROT UR QL STRIP: NEGATIVE MG/DL
RBC # BLD AUTO: 4.84 10*6/UL (ref 4.2–5.4)
SODIUM BLD-SCNC: 135 MMOL/L (ref 131–143)
SP GR UR STRIP: 1.01 (ref 1–1.03)
TRIGL SERPL-MCNC: 86 MG/DL (ref 0–149)
UROBILINOGEN UR STRIP-ACNC: NORMAL MG/DL
VLDL CHOLESTEROL: 17 MG/DL
WBC # BLD: 6.2 10*3/UL (ref 4.5–10)

## 2025-01-14 RX ORDER — ALPRAZOLAM 0.25 MG/1
0.25 TABLET ORAL 2 TIMES DAILY PRN
Qty: 60 TABLET | Refills: 2 | Status: SHIPPED | OUTPATIENT
Start: 2025-01-14

## 2025-01-20 ENCOUNTER — PATIENT MESSAGE (OUTPATIENT)
Dept: PRIMARY CARE CLINIC | Facility: CLINIC | Age: 40
End: 2025-01-20
Payer: MEDICAID

## 2025-01-20 NOTE — PROGRESS NOTES
Lab results reviewed, cholesterol elevated, will encourage low cholesterol diet and exercise 3-4 days per week. All other lab results are within normal range.

## 2025-01-29 ENCOUNTER — OFFICE VISIT (OUTPATIENT)
Dept: PRIMARY CARE CLINIC | Facility: CLINIC | Age: 40
End: 2025-01-29
Payer: MEDICAID

## 2025-01-29 VITALS
DIASTOLIC BLOOD PRESSURE: 92 MMHG | OXYGEN SATURATION: 99 % | WEIGHT: 146.13 LBS | HEIGHT: 64 IN | RESPIRATION RATE: 18 BRPM | SYSTOLIC BLOOD PRESSURE: 120 MMHG | BODY MASS INDEX: 24.95 KG/M2 | HEART RATE: 82 BPM

## 2025-01-29 DIAGNOSIS — E78.49 OTHER HYPERLIPIDEMIA: ICD-10-CM

## 2025-01-29 DIAGNOSIS — Z00.00 ANNUAL PHYSICAL EXAM: ICD-10-CM

## 2025-01-29 DIAGNOSIS — Z13.1 DIABETES MELLITUS SCREENING: ICD-10-CM

## 2025-01-29 DIAGNOSIS — E55.9 VITAMIN D DEFICIENCY: ICD-10-CM

## 2025-01-29 DIAGNOSIS — E03.8 OTHER SPECIFIED HYPOTHYROIDISM: ICD-10-CM

## 2025-01-29 DIAGNOSIS — F41.1 GENERALIZED ANXIETY DISORDER: Primary | ICD-10-CM

## 2025-01-29 PROCEDURE — 3080F DIAST BP >= 90 MM HG: CPT | Mod: CPTII,,, | Performed by: NURSE PRACTITIONER

## 2025-01-29 PROCEDURE — 3074F SYST BP LT 130 MM HG: CPT | Mod: CPTII,,, | Performed by: NURSE PRACTITIONER

## 2025-01-29 PROCEDURE — 99214 OFFICE O/P EST MOD 30 MIN: CPT | Mod: S$PBB,,, | Performed by: NURSE PRACTITIONER

## 2025-01-29 PROCEDURE — 3008F BODY MASS INDEX DOCD: CPT | Mod: CPTII,,, | Performed by: NURSE PRACTITIONER

## 2025-01-29 NOTE — PROGRESS NOTES
Subjective:       Patient ID: Sari Fernandez is a 39 y.o. female.    Chief Complaint: Annual Exam (Pt presents today for her annual. Pt denies having any problems or concerns at this time.)    HPI: Sari is a 39 y.o. female who presents for F/U.     Patient feels well today with no new issues or concerns.    On her last labs, her cholesterol was elevated so encouraged to eat a low cholesterol diet and exercise 3-4 days per week which she has started doing. All other lab results were within normal range.    Anxiety, chronic issue and is controlled with xanax as needed and is working well. Denies being depressed.    Hypothyroidism - TSH found to be elevated on labs but she refuses medication at this time. TSH increased slightly to 3.96 on recent labs from 3.56 last August and T4 Free is normal. Her mom also has thyroid disease. She was prescribed levothyroxine 25 mcg but did not take the medication to see if her level would come down on its own and it did.     Followed by Dr. Larios for GYN care. PAP UTD.    Denies drinking or smoking.    Refuses FLU and COVID vaccines.                      Past Medical History:   Diagnosis Date    ADHD     Anemia     Anxiety     Kidney stones     Urinary tract infection        Past Surgical History:   Procedure Laterality Date    ADENOIDECTOMY       SECTION, LOW TRANSVERSE      KIDNEY STONE SURGERY      LITHOTRIPSY         Family History   Problem Relation Name Age of Onset    Prostate cancer Paternal Grandfather Willis Fernandez     Cancer Paternal Grandfather Willis Fernandez     No Known Problems Paternal Grandmother      No Known Problems Maternal Grandmother      No Known Problems Maternal Grandfather      Cancer Father Srikanth hubbard     No Known Problems Mother         Social History     Tobacco Use    Smoking status: Never    Smokeless tobacco: Never   Substance Use Topics    Alcohol use: Never    Drug use: Never       Patient Active Problem List   Diagnosis  "   Anxiety disorder    Attention deficit hyperactivity disorder, predominantly inattentive type    Iron deficiency anemia    Other specified hypothyroidism       Immunization History   Administered Date(s) Administered    DTaP 1987, 04/08/1988, 06/23/1988, 04/18/1989, 08/06/1992    HIB 06/23/1999    Hepatitis B, Pediatric/Adolescent 07/09/1998, 08/12/1998, 04/21/1999    IPV 1987, 04/08/1988, 06/23/1988, 04/18/1989, 08/06/1992    MMR 11/14/1988, 08/06/1992    Td (ADULT) 07/09/1998, 11/12/2008    Tdap 07/09/1998, 11/12/2008, 05/31/2021, 06/28/2021           Review of Systems   Constitutional:  Negative for activity change, appetite change, chills, diaphoresis, fatigue and fever.   HENT:  Negative for congestion, ear pain, sinus pain, tinnitus and trouble swallowing.    Eyes:  Negative for visual disturbance.   Respiratory:  Negative for cough, chest tightness, shortness of breath and wheezing.    Cardiovascular:  Negative for chest pain, palpitations and leg swelling.   Gastrointestinal:  Negative for abdominal distention, abdominal pain, blood in stool, constipation, diarrhea and nausea.   Genitourinary:  Negative for dysuria, frequency and hematuria.   Musculoskeletal:  Negative for back pain, gait problem and neck pain.   Skin:  Negative for color change and rash.   Neurological:  Negative for dizziness, syncope, weakness, light-headedness, numbness and headaches.   Psychiatric/Behavioral:  Negative for behavioral problems and confusion.      Objective:     Vitals:    01/29/25 1443   BP: (!) 120/92   BP Location: Right arm   Patient Position: Sitting   Pulse: 82   Resp: 18   SpO2: 99%   Weight: 66.3 kg (146 lb 1.6 oz)   Height: 5' 4" (1.626 m)       Physical Exam  Vitals and nursing note reviewed.   Constitutional:       General: She is not in acute distress.     Appearance: Normal appearance. She is not diaphoretic.   HENT:      Head: Normocephalic and atraumatic.   Eyes:      General:         Right " eye: No discharge.         Left eye: No discharge.      Pupils: Pupils are equal, round, and reactive to light.   Cardiovascular:      Rate and Rhythm: Normal rate and regular rhythm.      Heart sounds: Normal heart sounds.   Pulmonary:      Effort: Pulmonary effort is normal.      Breath sounds: Normal breath sounds. No stridor. No wheezing or rales.   Abdominal:      Palpations: Abdomen is soft.      Tenderness: There is no abdominal tenderness. There is no guarding.   Musculoskeletal:         General: No tenderness. Normal range of motion.      Cervical back: Normal range of motion.      Right lower leg: No edema.      Left lower leg: No edema.   Lymphadenopathy:      Cervical: No cervical adenopathy.   Skin:     General: Skin is warm and dry.      Findings: No rash.   Neurological:      General: No focal deficit present.      Mental Status: She is alert and oriented to person, place, and time.   Psychiatric:         Mood and Affect: Mood normal.         Behavior: Behavior normal.         Orders Only on 01/13/2025   Component Date Value Ref Range Status    Amphetamine Screen, Ur 01/13/2025 Negative  Negative Final    Barbiturate Screen, Ur 01/13/2025 Negative  Negative Final    Benzodiazepine Screen, Urine 01/13/2025 Positive (A)  Negative Final    BENZOYLECGONINE, URINE 01/13/2025 Negative  Negative Final    Methadone Screen, Urine 01/13/2025 Negative  Negative Final    Opiate Scrn, Ur 01/13/2025 Negative  Negative Final    PCP Scrn, Ur 01/13/2025 Negative  Negative Final    Cannabinoid Scrn, Ur 01/13/2025 Negative  Negative Final    pH, Urine (Tox) 01/13/2025 6.0  5.0 - 8.0 Final   Orders Only on 01/13/2025   Component Date Value Ref Range Status    Color, UA 01/13/2025 Colorless  Yellow Final    Appearance, UA 01/13/2025 Clear  Clear Final    pH, UA 01/13/2025 6.0  5.0 - 8.0 pH Final    Specific Gravity, UA 01/13/2025 1.009  1.005 - 1.030 Final    Protein, UA 01/13/2025 Negative  Negative mg/dL Final     Glucose, UA 01/13/2025 Negative  Normal mg/dL Final    Ketones, UA 01/13/2025 Negative  Negative mg/dL Final    Occult Blood UA 01/13/2025 Negative  Negative /uL Final    Nitrite, UA 01/13/2025 Negative  Negative Final    Bilirubin (UA) 01/13/2025 Negative  Negative mg/dL Final    Urobilinogen, UA 01/13/2025 Normal  Normal mg/dL Final    Leukocytes, UA 01/13/2025 Negative  Negative /uL Final   Orders Only on 01/13/2025   Component Date Value Ref Range Status    Cholesterol 01/13/2025 213 (H)  <200 mg/dL Final    Triglycerides 01/13/2025 86  0 - 149 mg/dL Final    HDL 01/13/2025 59  >39 mg/dL Final    LDL Cholesterol 01/13/2025 136.8  mg/dL Final    VLDL 01/13/2025 17  mg/dL Final    HDL/Cholesterol Ratio 01/13/2025 3.6  Ratio Final    Sodium 01/13/2025 135  131 - 143 mmol/L Final    Potassium 01/13/2025 3.5  3.5 - 5.1 mmol/L Final    Chloride 01/13/2025 104  98 - 107 mmol/L Final    CO2 01/13/2025 25  21 - 32 mmol/L Final    Anion Gap 01/13/2025 6.0  3.0 - 11.0 mmol/L Final    BUN 01/13/2025 15.0  7.0 - 18.0 mg/dL Final    Creatinine 01/13/2025 0.81  0.55 - 1.02 mg/dL Final    GFR ESTIMATION 01/13/2025 96  >60 Final    BUN/Creatinine Ratio 01/13/2025 18.51  Ratio Final    Glucose 01/13/2025 81  74 - 106 mg/dL Final    Calcium 01/13/2025 9.4  8.5 - 10.1 mg/dL Final    Total Bilirubin 01/13/2025 0.6  0.2 - 1.0 mg/dL Final    AST 01/13/2025 14 (L)  15 - 37 U/L Final    ALT 01/13/2025 20  13 - 56 U/L Final    Total Protein 01/13/2025 7.9  6.4 - 8.2 g/dL Final    Albumin 01/13/2025 4.4  3.4 - 5.0 g/dL Final    Globulin 01/13/2025 3.5  2.3 - 3.5 g/dL Final    Albumin/Globulin Ratio 01/13/2025 1.3  1.1 - 1.8 Ratio Final    Alkaline Phosphatase 01/13/2025 75  45 - 117 U/L Final    WBC 01/13/2025 6.2  4.5 - 10.0 10*3/uL Final    RBC 01/13/2025 4.84  4.20 - 5.40 10*6/uL Final    Hemoglobin 01/13/2025 13.7  12.0 - 16.0 g/dL Final    Hematocrit 01/13/2025 42.1  37.0 - 47.0 % Final    MCV 01/13/2025 87.0  80.0 - 99.0 fL Final     MCH 01/13/2025 28.3  27.0 - 32.0 pg Final    MCHC 01/13/2025 32.5  32.0 - 36.0 % Final    RDW RBC Auto-Rto 01/13/2025 12.6  0.0 - 15.5 % Final    Platelets 01/13/2025 355  130 - 400 10*3/uL Final    Neutrophils 01/13/2025 66.2  50 - 80 % Final    Lymphocytes 01/13/2025 26.5  20.0 - 45.0 % Final    Monocytes 01/13/2025 4.4  2 - 10 % Final    Eosinophils 01/13/2025 2.3  0 - 6 % Final    Basophils 01/13/2025 0.3  0 - 3 % Final    nRBC# 01/13/2025 0.0  0 - 0.2 % Final    Neutrophils Absolute 01/13/2025 4.1  1.4 - 7.0 10*3/uL Final         Assessment:      1. Generalized anxiety disorder    2. Other hyperlipidemia    3. Annual physical exam    4. Vitamin D deficiency    5. Diabetes mellitus screening    6. Other specified hypothyroidism          Plan:     Generalized anxiety disorder    Other hyperlipidemia  -     Lipid Panel; Future; Expected date: 02/08/2025    Annual physical exam  -     CBC Auto Differential; Future; Expected date: 02/08/2025  -     Comprehensive Metabolic Panel; Future; Expected date: 02/08/2025  -     Hemoglobin A1C; Future; Expected date: 02/08/2025  -     Lipid Panel; Future; Expected date: 02/08/2025  -     TSH; Future; Expected date: 02/08/2025    Vitamin D deficiency  -     Vitamin D; Future; Expected date: 02/08/2025    Diabetes mellitus screening  -     Hemoglobin A1C; Future; Expected date: 02/08/2025    Other specified hypothyroidism  -     T4, Free; Future; Expected date: 02/08/2025  -     TSH; Future; Expected date: 02/08/2025         Current Outpatient Medications   Medication Sig Dispense Refill    ALPRAZolam (XANAX) 0.25 MG tablet Take 1 tablet (0.25 mg total) by mouth 2 (two) times daily as needed for Anxiety. 60 tablet 2     No current facility-administered medications for this visit.       Medications Discontinued During This Encounter   Medication Reason    cephALEXin (KEFLEX) 500 MG capsule Patient no longer taking    loratadine (CLARITIN) 10 mg tablet Patient no longer taking     "clotrimazole-betamethasone 1-0.05% (LOTRISONE) cream Patient no longer taking       Health Maintenance   Topic Date Due    Influenza Vaccine (1) Never done    COVID-19 Vaccine (1 - 2024-25 season) Never done    Cervical Cancer Screening  07/03/2026    Hemoglobin A1c (Diabetic Prevention Screening)  04/12/2027    TETANUS VACCINE  06/28/2031    RSV Vaccine (Age 60+ and Pregnant patients) (1 - 1-dose 75+ series) 07/10/2062    Hepatitis C Screening  Completed    HIV Screening  Completed    Lipid Panel  Completed    Pneumococcal Vaccines (Age 0-49)  Aged Out       Patient Instructions   RTC in 4 months for F/U or sooner if needed.    Patient Education       Low Cholesterol, Saturated Fat, and Trans Fat Diet   About this topic   Cholesterol, saturated fat, and trans fat are in many foods. These may raise your blood cholesterol levels. If your cholesterol is too high, this can cause health problems in your heart, liver, kidneys, and even your eyes. The key to lowering your risk of heart problems is to lower your bad fat intake.  Saturated fats and trans fats are the bad fats. These fats clog your arteries and raise your bad cholesterol. Saturated fats and trans fats are solid fats at room temperature. Saturated fats are animal fats. Trans fats are manmade fats. They add flavor to a lot of packaged foods. Staying away from saturated and trans fats will help your heart.  When you do eat foods with fat, make sure they have the good fats. Monounsaturated and polyunsaturated fats are good fats. These fats help raise your good cholesterol and protect your heart.  General   How to Lower Fat and Cholesterol in Your Diet   Read the labels of the foods you buy from the market to find out how much fat is present. Under 5% of total fat on a label means it is "low fat". Over 20% of total fat on a label means it is high fat.  Eat high fiber foods, like soluble fiber. This type of fiber helps lower cholesterol in the body. Choose " oatmeal, fruits (like apples), beans, and nuts to get the most soluble fiber.  Eat foods high in omega-3 fatty acids like karla seeds, walnuts, salmon, tuna, trout, herring, flaxseed, and soybeans. These foods help keep the heart healthy.  Limit your bad fat and oil intake.  Stay away from butter, stick margarine, shortening, lard, and palm and coconut oil. Pick plant-based spreads instead.  Limit mayonnaise, salad dressings, gravies, and sauces, unless it is made from low-fat ingredients.  Limit chocolate.  Do not eat high-fat processed foods like hot dogs, jones, sausage, ham and other luncheon meats high in fat, and some frozen foods. Pick fish, chicken, turkey, and lean meats instead.  Eat more dried beans, lentils, and tofu to get your protein.  Do not eat organ meats, like liver.  Choose nonfat or low-fat milk, yogurt, and cheese.  Use light or fat-free cream cheese and sour cream.  Eat lots of fruits and vegetables.  Pick whole grain breads, cereals, pastas, and rice.  Do not eat snacks that are high in fats like granola, cookies, pies, pastries, doughnuts, and croissants.  Stay away from deep fried foods.  Help When Cooking   Remove the fat portion of meats and the skin from poultry before cooking.  Bake, broil, grill, poach, or roast poultry, fish, and lean meats.  Drain and throw away the fat that drains out of meat as you cook it.  Try to add little or no fat to foods.  Use olive or canola oil for cooking or baking.  Steam your vegetables.  Use herbs or no-oil marinades to flavor foods.         Who should use this diet?   This diet is for people who are at high risk of getting health problems like heart disease, high blood pressure, diabetes, and others. This diet is also good for all people to follow to keep your heart healthy.  What foods are good to eat?   Foods with good fats are:  Canola, peanut, and olive oil  Safflower, soybean, and corn oil  Walnuts, almonds, cashews, and peanuts  Pumpkin and  sunflower seeds  [place] and tuna  Tofu  Soymilk  Avocado  What foods should be limited or avoided?   Stay away from these types of foods that have saturated fats:  Whole fat dairy products like cheese, ice cream, whole milk, and cream  Palm and coconut oils  High-fat meats like beef, lamb, poultry with the skin, jones, and sausage  Butter and lard  Stay away from these types of foods that may have trans fat:  Cookies, cakes, candy, doughnuts, baked goods, muffins, pizza dough, and pie crusts that are packaged  Fried foods  Frozen dinners  Chips and crackers  Microwave popcorn  Stick margarine and vegetable shortenings  Helpful tips   To help stay away from saturated fat:  Pick lean cuts of meat  Take the skin off chicken and turkey or pick skinless  Pick low-fat cheese, milk, and ice cream  Use liquid oils when cooking and baking, such as olive oil and canola oil  To help stay away from trans fat:  Look at your labels. Choose foods with 0% trans fat. Read the ingredient list. Avoid foods with partially hydrogenated oil in the ingredient list. This means there is trans fat in the product.  Where can I learn more?   American Heart Association   http://www.heart.org/HEARTORG/Conditions/Cholesterol/PreventionTreatmentofHighCholesterol/Know-Your-Fats_[facility]_305628_Article.jsp   Last Reviewed Date   2021-10-05  Consumer Information Use and Disclaimer   This information is not specific medical advice and does not replace information you receive from your health care provider. This is only a brief summary of general information. It does NOT include all information about conditions, illnesses, injuries, tests, procedures, treatments, therapies, discharge instructions or life-style choices that may apply to you. You must talk with your health care provider for complete information about your health and treatment options. This information should not be used to decide whether or not to accept your health care providers advice,  instructions or recommendations. Only your health care provider has the knowledge and training to provide advice that is right for you.   Copyright   Copyright © 2021 UpToDate, Inc. and its affiliates and/or licensors. All rights reserved.    Patient Education       Anxiety Discharge Instructions, Adult   About this topic   Anxiety can cause you to feel very worried. It can also cause physical symptoms like chest pain, stomach aches, or trouble sleeping. While mild anxiety is a normal response to stress, it can cause you problems in your everyday life. You may need follow-up care to help manage your anxiety. Anxiety happens in many forms, like:  Being scared all the time that something bad is going to happen. This is generalized anxiety.  Strong bursts of fear where your body has signs that may feel like a heart attack. This is called a panic attack.  Upsetting thoughts that happen often. There is a need to repeat doing certain things to help get rid of the anxiety caused by these thoughts. The thoughts or actions may be about checking on things, touching things, or worry about germs. This is an obsessive-compulsive disorder.  Strong fear of an object, place, or condition. This is a phobia.  Fear that others think bad things about you or being put down by other people. This is social anxiety.  Nightmares, flashbacks, staying away from people, or having panic attacks when reminded of a shocking or hurtful time or place from the past. This is post-traumatic stress.  Anxiety disorder may be treated in many ways. Some kinds of treatment have you talk about your beliefs, fears, and worries. You may learn how certain thoughts or feelings can raise anxiety. You may also learn what steps to take to lower anxiety. Other kinds of treatment may have you look back on a hurtful event, sad memory, or something you are afraid of. The doctor will help you deal with the feelings that you may have. You may learn ways to cope with  unwanted events or thoughts by looking at your fears in a way that feels safe.  What care is needed at home?   Ask your doctor what you need to do when you go home. Make sure you ask questions if you do not understand what the doctor says.  Set a time to talk with a counselor about your worries and feelings. This can help you with your anxiety.  Take care to follow all instructions when you take your medicines.  Limit alcohol and caffeine.  Learn ways to manage stress. Relaxation methods like reflection, deep breathing, and muscle relaxation may be helpful. Things like yoga, exercise, and maya chi are also good.  Talk about your feelings with family members and friends you trust. Talk to someone who can help you see how your thoughts at certain times may raise your anxiety.     What follow-up care is needed?   Your doctor may ask you to make visits to the office to check on your progress. Be sure to keep these visits.  What drugs may be needed?   The doctor may order drugs to help the physical signs of anxiety. Make sure that you take the drugs as taught to you by the doctor. Talk with your doctor about any side effects and ask how long you should take the drug.  Will physical activity be limited?   You may take part in physical activities. Some people are limited because of their anxiety or fear. Talk with your doctor about the right amount of activity for you.  What changes to diet are needed?   Eat a variety of healthy foods and limit drinks with caffeine. You should avoid alcohol, energy drinks, and over-the-counter stimulants.  What problems could happen?   If your anxiety is not treated, it can result in:  Staying away from work or social events  Not being able to do everyday tasks  Keeping away from family and friends  What can be done to prevent this health problem?   Learn what events, people, or things upset you. Limit your contact with them.  Talk about your feelings. Talk to someone who can help you see  how your thoughts at certain times may raise your anxiety.  Seek support from your friends and family. Find someone who calms you down. Ask if you can call them when you are getting anxious.  When do I need to call the doctor?   You feel you may harm yourself or someone else.  You can also call a mental health hotline for help.  You have any physical symptoms, such as chest pain, trouble breathing, or severe belly pain, that could be a sign of a serious problem.  If you are short of breath.  If you do not feel like you can be alone.  Teach Back: Helping You Understand   The Teach Back Method helps you understand the information we are giving you. After you talk with the staff, tell them in your own words what you learned. This helps to make sure the staff has described each thing clearly. It also helps to explain things that may have been confusing. Before going home, make sure you can do these:  I can tell you about my condition and the drugs I need to take.  I can tell you what may help lower my anxiety.  I can tell you what I will do if it is hard to breathe or I have chest pain.  I can tell you what I will do if I do not feel safe or cannot be alone.  Where can I learn more?   [employer]  https://www.chrissy.org/Learn-More/Mental-Health-Conditions/Anxiety-Disorders   National Health Service  https://www.nhs.uk/conditions/generalised-anxiety-disorder/symptoms/   [place] of Health ? [facility]  https://www.renny.nih.gov/health/relieving-stress-anxiety-ghtsuliic-vtsctilchr-pkzdoepinl   [place] of Mental Health  http://www.nimh.nih.gov/health/publications/anxiety-disorders/complete-index.shtml   Last Reviewed Date   2021-06-08  Consumer Information Use and Disclaimer   This information is not specific medical advice and does not replace information you receive from your health care provider. This is only a brief summary of general information. It does NOT include all information about conditions,  illnesses, injuries, tests, procedures, treatments, therapies, discharge instructions or life-style choices that may apply to you. You must talk with your health care provider for complete information about your health and treatment options. This information should not be used to decide whether or not to accept your health care providers advice, instructions or recommendations. Only your health care provider has the knowledge and training to provide advice that is right for you.  Copyright   Copyright © 2021 UpToDate, Inc. and its affiliates and/or licensors. All rights reserved.        Risks, benefits, and alternatives discussed with patient, Patient verbalized understanding of discussed plan of care. Asked patient if any further questions, answered no.    Future Appointments   Date Time Provider Department Center   5/28/2025  2:40 PM Antonina Campbell NP [id number]   6/10/2025  2:30 PM Jcarlos Larios MD [id number] Campbell NP

## 2025-01-29 NOTE — PATIENT INSTRUCTIONS
"RTC in 4 months for F/U or sooner if needed.    Patient Education       Low Cholesterol, Saturated Fat, and Trans Fat Diet   About this topic   Cholesterol, saturated fat, and trans fat are in many foods. These may raise your blood cholesterol levels. If your cholesterol is too high, this can cause health problems in your heart, liver, kidneys, and even your eyes. The key to lowering your risk of heart problems is to lower your bad fat intake.  Saturated fats and trans fats are the bad fats. These fats clog your arteries and raise your bad cholesterol. Saturated fats and trans fats are solid fats at room temperature. Saturated fats are animal fats. Trans fats are manmade fats. They add flavor to a lot of packaged foods. Staying away from saturated and trans fats will help your heart.  When you do eat foods with fat, make sure they have the good fats. Monounsaturated and polyunsaturated fats are good fats. These fats help raise your good cholesterol and protect your heart.  General   How to Lower Fat and Cholesterol in Your Diet   Read the labels of the foods you buy from the market to find out how much fat is present. Under 5% of total fat on a label means it is "low fat". Over 20% of total fat on a label means it is high fat.  Eat high fiber foods, like soluble fiber. This type of fiber helps lower cholesterol in the body. Choose oatmeal, fruits (like apples), beans, and nuts to get the most soluble fiber.  Eat foods high in omega-3 fatty acids like karla seeds, walnuts, salmon, tuna, trout, herring, flaxseed, and soybeans. These foods help keep the heart healthy.  Limit your bad fat and oil intake.  Stay away from butter, stick margarine, shortening, lard, and palm and coconut oil. Pick plant-based spreads instead.  Limit mayonnaise, salad dressings, gravies, and sauces, unless it is made from low-fat ingredients.  Limit chocolate.  Do not eat high-fat processed foods like hot dogs, jones, sausage, ham and other " luncheon meats high in fat, and some frozen foods. Pick fish, chicken, turkey, and lean meats instead.  Eat more dried beans, lentils, and tofu to get your protein.  Do not eat organ meats, like liver.  Choose nonfat or low-fat milk, yogurt, and cheese.  Use light or fat-free cream cheese and sour cream.  Eat lots of fruits and vegetables.  Pick whole grain breads, cereals, pastas, and rice.  Do not eat snacks that are high in fats like granola, cookies, pies, pastries, doughnuts, and croissants.  Stay away from deep fried foods.  Help When Cooking   Remove the fat portion of meats and the skin from poultry before cooking.  Bake, broil, grill, poach, or roast poultry, fish, and lean meats.  Drain and throw away the fat that drains out of meat as you cook it.  Try to add little or no fat to foods.  Use olive or canola oil for cooking or baking.  Steam your vegetables.  Use herbs or no-oil marinades to flavor foods.         Who should use this diet?   This diet is for people who are at high risk of getting health problems like heart disease, high blood pressure, diabetes, and others. This diet is also good for all people to follow to keep your heart healthy.  What foods are good to eat?   Foods with good fats are:  Canola, peanut, and olive oil  Safflower, soybean, and corn oil  Walnuts, almonds, cashews, and peanuts  Pumpkin and sunflower seeds  [place] and tuna  Tofu  Soymilk  Avocado  What foods should be limited or avoided?   Stay away from these types of foods that have saturated fats:  Whole fat dairy products like cheese, ice cream, whole milk, and cream  Palm and coconut oils  High-fat meats like beef, lamb, poultry with the skin, jones, and sausage  Butter and lard  Stay away from these types of foods that may have trans fat:  Cookies, cakes, candy, doughnuts, baked goods, muffins, pizza dough, and pie crusts that are packaged  Fried foods  Frozen dinners  Chips and crackers  Microwave popcorn  Stick margarine  and vegetable shortenings  Helpful tips   To help stay away from saturated fat:  Pick lean cuts of meat  Take the skin off chicken and turkey or pick skinless  Pick low-fat cheese, milk, and ice cream  Use liquid oils when cooking and baking, such as olive oil and canola oil  To help stay away from trans fat:  Look at your labels. Choose foods with 0% trans fat. Read the ingredient list. Avoid foods with partially hydrogenated oil in the ingredient list. This means there is trans fat in the product.  Where can I learn more?   American Heart Association   http://www.heart.org/HEARTORG/Conditions/Cholesterol/PreventionTreatmentofHighCholesterol/Know-Your-Fats_[facility]_305628_Article.jsp   Last Reviewed Date   2021-10-05  Consumer Information Use and Disclaimer   This information is not specific medical advice and does not replace information you receive from your health care provider. This is only a brief summary of general information. It does NOT include all information about conditions, illnesses, injuries, tests, procedures, treatments, therapies, discharge instructions or life-style choices that may apply to you. You must talk with your health care provider for complete information about your health and treatment options. This information should not be used to decide whether or not to accept your health care providers advice, instructions or recommendations. Only your health care provider has the knowledge and training to provide advice that is right for you.   Copyright   Copyright © 2021 [employer]. and its affiliates and/or licensors. All rights reserved.    Patient Education       Anxiety Discharge Instructions, Adult   About this topic   Anxiety can cause you to feel very worried. It can also cause physical symptoms like chest pain, stomach aches, or trouble sleeping. While mild anxiety is a normal response to stress, it can cause you problems in your everyday life. You may need follow-up care to help manage  your anxiety. Anxiety happens in many forms, like:  Being scared all the time that something bad is going to happen. This is generalized anxiety.  Strong bursts of fear where your body has signs that may feel like a heart attack. This is called a panic attack.  Upsetting thoughts that happen often. There is a need to repeat doing certain things to help get rid of the anxiety caused by these thoughts. The thoughts or actions may be about checking on things, touching things, or worry about germs. This is an obsessive-compulsive disorder.  Strong fear of an object, place, or condition. This is a phobia.  Fear that others think bad things about you or being put down by other people. This is social anxiety.  Nightmares, flashbacks, staying away from people, or having panic attacks when reminded of a shocking or hurtful time or place from the past. This is post-traumatic stress.  Anxiety disorder may be treated in many ways. Some kinds of treatment have you talk about your beliefs, fears, and worries. You may learn how certain thoughts or feelings can raise anxiety. You may also learn what steps to take to lower anxiety. Other kinds of treatment may have you look back on a hurtful event, sad memory, or something you are afraid of. The doctor will help you deal with the feelings that you may have. You may learn ways to cope with unwanted events or thoughts by looking at your fears in a way that feels safe.  What care is needed at home?   Ask your doctor what you need to do when you go home. Make sure you ask questions if you do not understand what the doctor says.  Set a time to talk with a counselor about your worries and feelings. This can help you with your anxiety.  Take care to follow all instructions when you take your medicines.  Limit alcohol and caffeine.  Learn ways to manage stress. Relaxation methods like reflection, deep breathing, and muscle relaxation may be helpful. Things like yoga, exercise, and maya chi  are also good.  Talk about your feelings with family members and friends you trust. Talk to someone who can help you see how your thoughts at certain times may raise your anxiety.     What follow-up care is needed?   Your doctor may ask you to make visits to the office to check on your progress. Be sure to keep these visits.  What drugs may be needed?   The doctor may order drugs to help the physical signs of anxiety. Make sure that you take the drugs as taught to you by the doctor. Talk with your doctor about any side effects and ask how long you should take the drug.  Will physical activity be limited?   You may take part in physical activities. Some people are limited because of their anxiety or fear. Talk with your doctor about the right amount of activity for you.  What changes to diet are needed?   Eat a variety of healthy foods and limit drinks with caffeine. You should avoid alcohol, energy drinks, and over-the-counter stimulants.  What problems could happen?   If your anxiety is not treated, it can result in:  Staying away from work or social events  Not being able to do everyday tasks  Keeping away from family and friends  What can be done to prevent this health problem?   Learn what events, people, or things upset you. Limit your contact with them.  Talk about your feelings. Talk to someone who can help you see how your thoughts at certain times may raise your anxiety.  Seek support from your friends and family. Find someone who calms you down. Ask if you can call them when you are getting anxious.  When do I need to call the doctor?   You feel you may harm yourself or someone else.  You can also call a mental health hotline for help.  You have any physical symptoms, such as chest pain, trouble breathing, or severe belly pain, that could be a sign of a serious problem.  If you are short of breath.  If you do not feel like you can be alone.  Teach Back: Helping You Understand   The Teach Back Method helps  you understand the information we are giving you. After you talk with the staff, tell them in your own words what you learned. This helps to make sure the staff has described each thing clearly. It also helps to explain things that may have been confusing. Before going home, make sure you can do these:  I can tell you about my condition and the drugs I need to take.  I can tell you what may help lower my anxiety.  I can tell you what I will do if it is hard to breathe or I have chest pain.  I can tell you what I will do if I do not feel safe or cannot be alone.  Where can I learn more?   [facility]  https://www.chrissy.org/Learn-More/Mental-Health-Conditions/Anxiety-Disorders   National Health Service  https://www.nhs.uk/conditions/generalised-anxiety-disorder/symptoms/   [employer] [facility] ? [facility]  https://www.renny.nih.gov/health/relieving-stress-anxiety-erlddglfy-zowrjwhswo-oiuigfxfnr   [employer]  http://www.nimh.nih.gov/health/publications/anxiety-disorders/complete-index.shtml   Last Reviewed Date   2021-06-08  Consumer Information Use and Disclaimer   This information is not specific medical advice and does not replace information you receive from your health care provider. This is only a brief summary of general information. It does NOT include all information about conditions, illnesses, injuries, tests, procedures, treatments, therapies, discharge instructions or life-style choices that may apply to you. You must talk with your health care provider for complete information about your health and treatment options. This information should not be used to decide whether or not to accept your health care providers advice, instructions or recommendations. Only your health care provider has the knowledge and training to provide advice that is right for you.  Copyright   Copyright © 2021 [employer], Inc. and its affiliates and/or licensors. All rights reserved.

## 2025-01-30 ENCOUNTER — PATIENT MESSAGE (OUTPATIENT)
Dept: PRIMARY CARE CLINIC | Facility: CLINIC | Age: 40
End: 2025-01-30
Payer: MEDICAID

## 2025-02-04 ENCOUNTER — OFFICE VISIT (OUTPATIENT)
Dept: OBSTETRICS AND GYNECOLOGY | Facility: CLINIC | Age: 40
End: 2025-02-04
Payer: MEDICAID

## 2025-02-04 VITALS
WEIGHT: 147 LBS | BODY MASS INDEX: 25.23 KG/M2 | SYSTOLIC BLOOD PRESSURE: 135 MMHG | HEART RATE: 74 BPM | DIASTOLIC BLOOD PRESSURE: 86 MMHG

## 2025-02-04 DIAGNOSIS — N64.4 BREAST PAIN: Primary | ICD-10-CM

## 2025-02-04 PROCEDURE — 3075F SYST BP GE 130 - 139MM HG: CPT | Mod: CPTII,,, | Performed by: OBSTETRICS & GYNECOLOGY

## 2025-02-04 PROCEDURE — 3008F BODY MASS INDEX DOCD: CPT | Mod: CPTII,,, | Performed by: OBSTETRICS & GYNECOLOGY

## 2025-02-04 PROCEDURE — 3079F DIAST BP 80-89 MM HG: CPT | Mod: CPTII,,, | Performed by: OBSTETRICS & GYNECOLOGY

## 2025-02-04 PROCEDURE — 99213 OFFICE O/P EST LOW 20 MIN: CPT | Mod: S$PBB,,, | Performed by: OBSTETRICS & GYNECOLOGY

## 2025-02-04 NOTE — PROGRESS NOTES
Subjective     Patient ID: Sari Fernandez is a 39 y.o. female.    Chief Complaint:  Breast Pain      History of Present Illness  HPI  Left breast pain- shooting in nature to nipple    GYN & OB History  No LMP recorded.   Date of Last Pap: 7/10/2023    OB History    Para Term  AB Living   2 2 2     2   SAB IAB Ectopic Multiple Live Births           2      # Outcome Date GA Lbr Leonard/2nd Weight Sex Type Anes PTL Lv   2 Term 21 37w0d  2.94 kg (6 lb 7.7 oz) M CS-LTranv   PAM      Complications: Gestational hypertension   1 Term     M CS-LTranv   PAM       Review of Systems  Review of Systems   Constitutional:  Negative for chills and fever.   Respiratory:  Negative for shortness of breath.    Cardiovascular:  Negative for chest pain.   Gastrointestinal:  Negative for abdominal pain, blood in stool, constipation, diarrhea, nausea, vomiting and reflux.   Genitourinary:  Negative for dysmenorrhea, dyspareunia, dysuria, hematuria, hot flashes, menorrhagia, menstrual problem, pelvic pain, vaginal bleeding, vaginal discharge, postcoital bleeding and vaginal dryness.   Musculoskeletal:  Negative for arthralgias and joint swelling.   Integumentary:  Negative for rash, hair changes, breast mass, nipple discharge and breast skin changes.   Psychiatric/Behavioral:  Negative for depression. The patient is not nervous/anxious.    Breast: Positive for mastodynia.Negative for asymmetry, lump, mass, nipple discharge and skin changes         Objective   Physical Exam:   Constitutional: She appears well-developed and well-nourished. No distress.    HENT:   Head: Normocephalic.    Eyes: Conjunctivae and EOM are normal.    Neck: No tracheal deviation present. No thyromegaly present.    Cardiovascular:       Exam reveals no clubbing, no cyanosis and no edema.        Pulmonary/Chest: Effort normal. No respiratory distress.                  Musculoskeletal: Normal range of motion and moves all extremeties.        Skin:  No rash noted. She is not diaphoretic. No cyanosis. Nails show no clubbing.    Psychiatric: She has a normal mood and affect. Her behavior is normal. Judgment and thought content normal.       Breast exam nml     Assessment and Plan     1. Breast pain            Plan:  Rtc prn

## 2025-02-07 ENCOUNTER — PATIENT MESSAGE (OUTPATIENT)
Dept: PRIMARY CARE CLINIC | Facility: CLINIC | Age: 40
End: 2025-02-07
Payer: MEDICAID

## 2025-02-07 DIAGNOSIS — N64.4 BREAST PAIN, LEFT: Primary | ICD-10-CM

## 2025-02-07 NOTE — TELEPHONE ENCOUNTER
Please let patient know I have ordered an ultrasound to be done of her left breast so she will receive a call to schedule it once her insurance approves. Please fax ultrasound order. Thanks a bunch.

## 2025-02-27 ENCOUNTER — RESULTS FOLLOW-UP (OUTPATIENT)
Dept: PRIMARY CARE CLINIC | Facility: CLINIC | Age: 40
End: 2025-02-27

## 2025-02-27 DIAGNOSIS — R92.321 SCATTERED FIBROGLANDULAR TISSUE DENSITY OF RIGHT BREAST ON MAMMOGRAPHY: ICD-10-CM

## 2025-02-27 DIAGNOSIS — N64.89 BREAST ASYMMETRY IN FEMALE: ICD-10-CM

## 2025-02-27 DIAGNOSIS — R92.8 ABNORMAL MAMMOGRAM OF RIGHT BREAST: Primary | ICD-10-CM

## 2025-02-27 NOTE — PROGRESS NOTES
Please notify patient her Mammogram shows dense tissue in both of her breasts but there is a bigger area or clump of dense breast tissue seen in the right breast that appears to be benign but it is recommended a right breast mammogram be done again in 6 months to see how the area is then so let her know I have ordered the right breast mammogram to be done in 6 months so she will receive a call to schedule it. Her left breast shows dense breast tissue but it is stable so no further imaging is needed of her left breast.

## 2025-03-18 ENCOUNTER — DOCUMENTATION ONLY (OUTPATIENT)
Dept: OBSTETRICS AND GYNECOLOGY | Facility: CLINIC | Age: 40
End: 2025-03-18
Payer: MEDICAID

## 2025-04-09 DIAGNOSIS — F41.9 ANXIETY: ICD-10-CM

## 2025-04-10 ENCOUNTER — PATIENT MESSAGE (OUTPATIENT)
Dept: PRIMARY CARE CLINIC | Facility: CLINIC | Age: 40
End: 2025-04-10
Payer: MEDICAID

## 2025-04-10 RX ORDER — ALPRAZOLAM 0.25 MG/1
0.25 TABLET ORAL 2 TIMES DAILY PRN
Qty: 60 TABLET | Refills: 2 | Status: SHIPPED | OUTPATIENT
Start: 2025-04-10

## 2025-04-22 ENCOUNTER — PATIENT MESSAGE (OUTPATIENT)
Dept: PRIMARY CARE CLINIC | Facility: CLINIC | Age: 40
End: 2025-04-22
Payer: MEDICAID

## 2025-04-22 DIAGNOSIS — K21.00 GASTROESOPHAGEAL REFLUX DISEASE WITH ESOPHAGITIS WITHOUT HEMORRHAGE: Primary | ICD-10-CM

## 2025-04-23 RX ORDER — PANTOPRAZOLE SODIUM 40 MG/1
40 TABLET, DELAYED RELEASE ORAL DAILY
Qty: 30 TABLET | Refills: 0 | Status: SHIPPED | OUTPATIENT
Start: 2025-04-23

## 2025-04-23 NOTE — TELEPHONE ENCOUNTER
Please tell patient that I have reviewed her ER visit and it looks like the pain may be from indigestion or acid reflux so a medication named pantoprazole was called out to help with the indigestion/acid reflux pain. Tell Ms. Fernandez to go ahead and  the pantoprazole and start taking it to help with the pain and if the medication does not help in a couple days to let me know. Thanks.

## 2025-05-02 ENCOUNTER — PATIENT MESSAGE (OUTPATIENT)
Dept: PRIMARY CARE CLINIC | Facility: CLINIC | Age: 40
End: 2025-05-02
Payer: MEDICAID

## 2025-05-23 ENCOUNTER — CLINICAL SUPPORT (OUTPATIENT)
Dept: OBSTETRICS AND GYNECOLOGY | Facility: CLINIC | Age: 40
End: 2025-05-23
Payer: MEDICAID

## 2025-05-23 DIAGNOSIS — Z01.89 ROUTINE LAB DRAW: Primary | ICD-10-CM

## 2025-05-23 LAB
ALBUMIN/GLOB SERPL: 1.8 {RATIO}
ALBUMIN: 4.6
ALP ISOS SERPL LEV INH-CCNC: 64 U/L
ALT SERPL-CCNC: 11 UNIT/L
ANION GAP SERPL CALC-SCNC: 10 MMOL/L (ref ?–30)
AST: 12
BILIRUB SERPL-MCNC: 0.6 MG/DL (ref 0.1–1.4)
BUN SERPL-MCNC: 12 MG/DL
BUN/CREAT SERPL: 15
CALCIUM SERPL-MCNC: 9.5 MG/DL
CHLORIDE SERPL-SCNC: 105 MMOL/L (ref 99–108)
CHOLEST SERPL-MSCNC: 194 MG/DL (ref 0–200)
CO2 SERPL-SCNC: 25 MMOL/L
CREAT SERPL-MCNC: 0.8 MG/DL
EGFR: 95.2
GLOBULIN SER-MCNC: 2.6 G/DL
GLUCOSE SERPL-MCNC: 85 MG/DL
HBA1C MFR BLD: 5 % (ref 4–6)
HDLC SERPL-MCNC: 57 MG/DL (ref 35–70)
LDL/HDL RATIO: 2.1
LDLC SERPL CALC-MCNC: 122 MG/DL
POTASSIUM SERPL-SCNC: 4.2 MMOL/L (ref 3.4–5.3)
PROTEIN TOTAL: 7.2
SODIUM BLD-SCNC: 140 MMOL/L (ref 137–147)
TRIGL SERPL-MCNC: 76 MG/DL (ref 40–160)

## 2025-05-28 ENCOUNTER — OFFICE VISIT (OUTPATIENT)
Dept: PRIMARY CARE CLINIC | Facility: CLINIC | Age: 40
End: 2025-05-28
Payer: MEDICAID

## 2025-05-28 VITALS
BODY MASS INDEX: 23.7 KG/M2 | DIASTOLIC BLOOD PRESSURE: 67 MMHG | OXYGEN SATURATION: 99 % | HEIGHT: 64 IN | RESPIRATION RATE: 18 BRPM | HEART RATE: 70 BPM | WEIGHT: 138.81 LBS | SYSTOLIC BLOOD PRESSURE: 110 MMHG

## 2025-05-28 DIAGNOSIS — F41.1 GENERALIZED ANXIETY DISORDER: ICD-10-CM

## 2025-05-28 DIAGNOSIS — F41.9 ANXIETY: ICD-10-CM

## 2025-05-28 DIAGNOSIS — Z00.00 ANNUAL PHYSICAL EXAM: Primary | ICD-10-CM

## 2025-05-28 DIAGNOSIS — E78.49 OTHER HYPERLIPIDEMIA: ICD-10-CM

## 2025-05-28 PROCEDURE — 1160F RVW MEDS BY RX/DR IN RCRD: CPT | Mod: CPTII,,, | Performed by: NURSE PRACTITIONER

## 2025-05-28 PROCEDURE — 1159F MED LIST DOCD IN RCRD: CPT | Mod: CPTII,,, | Performed by: NURSE PRACTITIONER

## 2025-05-28 PROCEDURE — 3008F BODY MASS INDEX DOCD: CPT | Mod: CPTII,,, | Performed by: NURSE PRACTITIONER

## 2025-05-28 PROCEDURE — 99395 PREV VISIT EST AGE 18-39: CPT | Mod: S$PBB,,, | Performed by: NURSE PRACTITIONER

## 2025-05-28 PROCEDURE — 3074F SYST BP LT 130 MM HG: CPT | Mod: CPTII,,, | Performed by: NURSE PRACTITIONER

## 2025-05-28 PROCEDURE — 3078F DIAST BP <80 MM HG: CPT | Mod: CPTII,,, | Performed by: NURSE PRACTITIONER

## 2025-05-28 RX ORDER — TERBINAFINE HYDROCHLORIDE 250 MG/1
250 TABLET ORAL
COMMUNITY
Start: 2025-01-16 | End: 2025-05-28

## 2025-05-28 RX ORDER — PANTOPRAZOLE SODIUM 20 MG/1
40 TABLET, DELAYED RELEASE ORAL
COMMUNITY
Start: 2025-04-19 | End: 2025-05-28

## 2025-05-28 RX ORDER — AMOXICILLIN 875 MG/1
875 TABLET, COATED ORAL 2 TIMES DAILY
COMMUNITY
Start: 2025-04-12 | End: 2025-05-28 | Stop reason: ALTCHOICE

## 2025-05-28 RX ORDER — FLUTICASONE PROPIONATE 50 MCG
2 SPRAY, SUSPENSION (ML) NASAL
COMMUNITY
Start: 2025-04-12 | End: 2025-05-28

## 2025-05-28 RX ORDER — ALPRAZOLAM 0.5 MG/1
0.5 TABLET ORAL 2 TIMES DAILY PRN
Qty: 60 TABLET | Refills: 2 | Status: SHIPPED | OUTPATIENT
Start: 2025-05-28

## 2025-05-28 NOTE — PATIENT INSTRUCTIONS
RTC in 3 months for F/U or sooner if needed.    Patient Education       Yearly Physical for Adults   About this topic   Most people do not want to be sick. Having a checkup each year with your doctor is one way to help you stay healthy. You may need to see your doctor more or less often. How often you need to go to the doctor depends on your age. Your family and medical history also play a role in how often you need to go to the doctor. Going to see your doctor on a routine basis can help you find problems early or even before they start. This may make it easier to treat or cure your problem.  General   Your doctor will talk about many things during your checkup. Your doctor may ask about:  Your medical and family history.  All the drugs you are taking. Be sure to include all prescription, over the counter, and herbal supplements. Tell the doctor if you have any drug allergy. Bring a list of drugs you take with you.  How you are feeling and if you are having any problems.  Risky behaviors like smoking, drinking alcohol, using illegal drugs, not wearing seatbelts, having unprotected sex, etc.  Your doctor will do a physical exam and may check your:  Height and weight  Blood pressure  Reflexes  Memory  Vision  Hearing  Your doctor may order:  Lab tests  ECG to check your heart rhythm  X-rays  Tests or treatments based on your exam  What lifestyle changes are needed?   Your doctor may suggest you make changes to your lifestyle at this visit. The doctor may talk with you about being more active or lowering stress levels. Ask your doctor what you need to do.  What drugs may be needed?   Your doctor may order drugs or vaccines to protect you from illnesses.  What changes to diet are needed?   Talk to your doctor to see if any changes are needed to your diet.  When do I need to call the doctor?   Call your doctor if you need to learn about any test results. Together you can make a plan for more care.  Helpful tips   Make a  list of questions for your doctor before you go. This will help you remember to ask about any concerns. Write down any answers from your doctor so you can look over them after your visit.   Tell your doctor about any changes in your body or health since your last visit.  Ask your doctor about any screening tests you need.  Where can I learn more?   American Academy of [employer]icians  http://familydoctor.org/familydoctor/en/prevention-wellness/staying-healthy/healthy-living/preventive-services-for-healthy-living.printerview.html   Centers for Disease Control  http://www.cdc.gov/family/checkup/   Last Reviewed Date   2019-04-22  Consumer Information Use and Disclaimer   This information is not specific medical advice and does not replace information you receive from your health care provider. This is only a brief summary of general information. It does NOT include all information about conditions, illnesses, injuries, tests, procedures, treatments, therapies, discharge instructions or life-style choices that may apply to you. You must talk with your health care provider for complete information about your health and treatment options. This information should not be used to decide whether or not to accept your health care providers advice, instructions or recommendations. Only your health care provider has the knowledge and training to provide advice that is right for you.  Copyright   Copyright © 2021 [employer]. and its affiliates and/or licensors. All rights reserved.      Patient Education       Anxiety Discharge Instructions, Adult   About this topic   Anxiety can cause you to feel very worried. It can also cause physical symptoms like chest pain, stomach aches, or trouble sleeping. While mild anxiety is a normal response to stress, it can cause you problems in your everyday life. You may need follow-up care to help manage your anxiety. Anxiety happens in many forms, like:  Being scared all the time that  something bad is going to happen. This is generalized anxiety.  Strong bursts of fear where your body has signs that may feel like a heart attack. This is called a panic attack.  Upsetting thoughts that happen often. There is a need to repeat doing certain things to help get rid of the anxiety caused by these thoughts. The thoughts or actions may be about checking on things, touching things, or worry about germs. This is an obsessive-compulsive disorder.  Strong fear of an object, place, or condition. This is a phobia.  Fear that others think bad things about you or being put down by other people. This is social anxiety.  Nightmares, flashbacks, staying away from people, or having panic attacks when reminded of a shocking or hurtful time or place from the past. This is post-traumatic stress.  Anxiety disorder may be treated in many ways. Some kinds of treatment have you talk about your beliefs, fears, and worries. You may learn how certain thoughts or feelings can raise anxiety. You may also learn what steps to take to lower anxiety. Other kinds of treatment may have you look back on a hurtful event, sad memory, or something you are afraid of. The doctor will help you deal with the feelings that you may have. You may learn ways to cope with unwanted events or thoughts by looking at your fears in a way that feels safe.  What care is needed at home?   Ask your doctor what you need to do when you go home. Make sure you ask questions if you do not understand what the doctor says.  Set a time to talk with a counselor about your worries and feelings. This can help you with your anxiety.  Take care to follow all instructions when you take your medicines.  Limit alcohol and caffeine.  Learn ways to manage stress. Relaxation methods like reflection, deep breathing, and muscle relaxation may be helpful. Things like yoga, exercise, and maya chi are also good.  Talk about your feelings with family members and friends you trust.  Talk to someone who can help you see how your thoughts at certain times may raise your anxiety.     What follow-up care is needed?   Your doctor may ask you to make visits to the office to check on your progress. Be sure to keep these visits.  What drugs may be needed?   The doctor may order drugs to help the physical signs of anxiety. Make sure that you take the drugs as taught to you by the doctor. Talk with your doctor about any side effects and ask how long you should take the drug.  Will physical activity be limited?   You may take part in physical activities. Some people are limited because of their anxiety or fear. Talk with your doctor about the right amount of activity for you.  What changes to diet are needed?   Eat a variety of healthy foods and limit drinks with caffeine. You should avoid alcohol, energy drinks, and over-the-counter stimulants.  What problems could happen?   If your anxiety is not treated, it can result in:  Staying away from work or social events  Not being able to do everyday tasks  Keeping away from family and friends  What can be done to prevent this health problem?   Learn what events, people, or things upset you. Limit your contact with them.  Talk about your feelings. Talk to someone who can help you see how your thoughts at certain times may raise your anxiety.  Seek support from your friends and family. Find someone who calms you down. Ask if you can call them when you are getting anxious.  When do I need to call the doctor?   You feel you may harm yourself or someone else.  You can also call a mental health hotline for help.  You have any physical symptoms, such as chest pain, trouble breathing, or severe belly pain, that could be a sign of a serious problem.  If you are short of breath.  If you do not feel like you can be alone.  Teach Back: Helping You Understand   The Teach Back Method helps you understand the information we are giving you. After you talk with the staff,  tell them in your own words what you learned. This helps to make sure the staff has described each thing clearly. It also helps to explain things that may have been confusing. Before going home, make sure you can do these:  I can tell you about my condition and the drugs I need to take.  I can tell you what may help lower my anxiety.  I can tell you what I will do if it is hard to breathe or I have chest pain.  I can tell you what I will do if I do not feel safe or cannot be alone.  Where can I learn more?   [facility]  https://www.chrissy.org/Learn-More/Mental-Health-Conditions/Anxiety-Disorders   National Health Service  https://www.nhs.uk/conditions/generalised-anxiety-disorder/symptoms/   [employer] ? [facility]  https://www.renny.nih.gov/health/relieving-stress-anxiety-hldcyvyji-gtlwbbnblp-molvbocvpj   [employer] of Mental Health  http://www.nimh.nih.gov/health/publications/anxiety-disorders/complete-index.shtml   Last Reviewed Date   2021-06-08  Consumer Information Use and Disclaimer   This information is not specific medical advice and does not replace information you receive from your health care provider. This is only a brief summary of general information. It does NOT include all information about conditions, illnesses, injuries, tests, procedures, treatments, therapies, discharge instructions or life-style choices that may apply to you. You must talk with your health care provider for complete information about your health and treatment options. This information should not be used to decide whether or not to accept your health care providers advice, instructions or recommendations. Only your health care provider has the knowledge and training to provide advice that is right for you.  Copyright   Copyright © 2021 UpToDate, Inc. and its affiliates and/or licensors. All rights reserved.      Patient Education       Low Cholesterol, Saturated Fat, and Trans Fat Diet   About this topic  "  Cholesterol, saturated fat, and trans fat are in many foods. These may raise your blood cholesterol levels. If your cholesterol is too high, this can cause health problems in your heart, liver, kidneys, and even your eyes. The key to lowering your risk of heart problems is to lower your bad fat intake.  Saturated fats and trans fats are the bad fats. These fats clog your arteries and raise your bad cholesterol. Saturated fats and trans fats are solid fats at room temperature. Saturated fats are animal fats. Trans fats are manmade fats. They add flavor to a lot of packaged foods. Staying away from saturated and trans fats will help your heart.  When you do eat foods with fat, make sure they have the good fats. Monounsaturated and polyunsaturated fats are good fats. These fats help raise your good cholesterol and protect your heart.  General   How to Lower Fat and Cholesterol in Your Diet   Read the labels of the foods you buy from the market to find out how much fat is present. Under 5% of total fat on a label means it is "low fat". Over 20% of total fat on a label means it is high fat.  Eat high fiber foods, like soluble fiber. This type of fiber helps lower cholesterol in the body. Choose oatmeal, fruits (like apples), beans, and nuts to get the most soluble fiber.  Eat foods high in omega-3 fatty acids like karla seeds, walnuts, salmon, tuna, trout, herring, flaxseed, and soybeans. These foods help keep the heart healthy.  Limit your bad fat and oil intake.  Stay away from butter, stick margarine, shortening, lard, and palm and coconut oil. Pick plant-based spreads instead.  Limit mayonnaise, salad dressings, gravies, and sauces, unless it is made from low-fat ingredients.  Limit chocolate.  Do not eat high-fat processed foods like hot dogs, jones, sausage, ham and other luncheon meats high in fat, and some frozen foods. Pick fish, chicken, turkey, and lean meats instead.  Eat more dried beans, lentils, and " tofu to get your protein.  Do not eat organ meats, like liver.  Choose nonfat or low-fat milk, yogurt, and cheese.  Use light or fat-free cream cheese and sour cream.  Eat lots of fruits and vegetables.  Pick whole grain breads, cereals, pastas, and rice.  Do not eat snacks that are high in fats like granola, cookies, pies, pastries, doughnuts, and croissants.  Stay away from deep fried foods.  Help When Cooking   Remove the fat portion of meats and the skin from poultry before cooking.  Bake, broil, grill, poach, or roast poultry, fish, and lean meats.  Drain and throw away the fat that drains out of meat as you cook it.  Try to add little or no fat to foods.  Use olive or canola oil for cooking or baking.  Steam your vegetables.  Use herbs or no-oil marinades to flavor foods.         Who should use this diet?   This diet is for people who are at high risk of getting health problems like heart disease, high blood pressure, diabetes, and others. This diet is also good for all people to follow to keep your heart healthy.  What foods are good to eat?   Foods with good fats are:  Canola, peanut, and olive oil  Safflower, soybean, and corn oil  Walnuts, almonds, cashews, and peanuts  Pumpkin and sunflower seeds  [place] and tuna  Tofu  Soymilk  Avocado  What foods should be limited or avoided?   Stay away from these types of foods that have saturated fats:  Whole fat dairy products like cheese, ice cream, whole milk, and cream  Palm and coconut oils  High-fat meats like beef, lamb, poultry with the skin, jones, and sausage  Butter and lard  Stay away from these types of foods that may have trans fat:  Cookies, cakes, candy, doughnuts, baked goods, muffins, pizza dough, and pie crusts that are packaged  Fried foods  Frozen dinners  Chips and crackers  Microwave popcorn  Stick margarine and vegetable shortenings  Helpful tips   To help stay away from saturated fat:  Pick lean cuts of meat  Take the skin off chicken and  turkey or pick skinless  Pick low-fat cheese, milk, and ice cream  Use liquid oils when cooking and baking, such as olive oil and canola oil  To help stay away from trans fat:  Look at your labels. Choose foods with 0% trans fat. Read the ingredient list. Avoid foods with partially hydrogenated oil in the ingredient list. This means there is trans fat in the product.  Where can I learn more?   American Heart Association   http://www.heart.org/HEARTORG/Conditions/Cholesterol/PreventionTreatmentofHighCholesterol/Know-Your-Fats_[facility]_305628_Article.jsp   Last Reviewed Date   2021-10-05  Consumer Information Use and Disclaimer   This information is not specific medical advice and does not replace information you receive from your health care provider. This is only a brief summary of general information. It does NOT include all information about conditions, illnesses, injuries, tests, procedures, treatments, therapies, discharge instructions or life-style choices that may apply to you. You must talk with your health care provider for complete information about your health and treatment options. This information should not be used to decide whether or not to accept your health care providers advice, instructions or recommendations. Only your health care provider has the knowledge and training to provide advice that is right for you.   Copyright   Copyright © 2021 UpToDate, Inc. and its affiliates and/or licensors. All rights reserved.

## 2025-05-28 NOTE — LETTER
May 28, 2025      [facility]  [address] [place] [address], [address]  [facility] [address]  Phone: [phone]  Fax: [phone]       Patient: Sari Fernandez   YOB: 1985      To Whom It May Concern:    Ms. Fernandez has been under my care since 08/2022. Ms. Fernandez has a diagnosis of Generalized Anxiety Disorder which causes her increased nervousness and anxiety on a daily basis. Due to Ms. 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 If you have any questions or concerns, or if I can be of further assistance, please do not hesitate to contact me.    Sincerely,        Antonina Campbell NP

## 2025-05-28 NOTE — PROGRESS NOTES
Subjective:       Patient ID: Sari Fernandez is a 39 y.o. female.    Chief Complaint: Annual Exam    HPI: Sari is a 39 y.o. female who presents for annual visit.    Patient feels well today but she has been having increased anxiety. Anxiety - chronic issue which has been controlled with xanax 0.25 mg as needed. Denies being depressed.    Annual labs drawn last week and they are all within normal range.    Hypothyroidism - TSH was found to be elevated on previous labs but is now normal on recent labs. Her mom has thyroid disease. She was prescribed levothyroxine 25 mcg but did not take the medication to see if her level would come down on its own and it did. Thyroid panel normal on recent labs.    Ms. Fernandez is followed by Dr. Larios for GYN care. PAP UTD. Please notify patient her Mammogram shows dense tissue in both of her breasts but there is a bigger area or clump of dense breast tissue seen in the right breast that appears to be benign but it is recommended a right breast mammogram be done again in 6 months to see how the area is then so let her know I have ordered the right breast mammogram to be done in 6 months so she will receive a call to schedule it. Her left breast shows dense breast tissue but it is stable so no further imaging is needed of her left breast. It is scheduled on 25.    Patient denies drinking or smoking.                          Past Medical History:   Diagnosis Date    ADHD     Anemia     Anxiety     Kidney stones     Urinary tract infection        Past Surgical History:   Procedure Laterality Date    ADENOIDECTOMY  1994     SECTION, LOW TRANSVERSE      KIDNEY STONE SURGERY      LITHOTRIPSY         Family History   Problem Relation Name Age of Onset    Prostate cancer Paternal Grandfather Willis Limangel     Cancer Paternal Grandfather Willis Forresterkatie     No Known Problems Paternal Grandmother      No Known Problems Maternal Grandmother      No Known Problems  "Maternal Grandfather      Cancer Father Srikanth hubbard     No Known Problems Mother         Social History[1]    Problem List[2]    Immunization History   Administered Date(s) Administered    DTaP 1987, 04/08/1988, 06/23/1988, 04/18/1989, 08/06/1992    HIB 06/23/1999    Hepatitis B, Pediatric/Adolescent 07/09/1998, 08/12/1998, 04/21/1999    IPV 1987, 04/08/1988, 06/23/1988, 04/18/1989, 08/06/1992    MMR 11/14/1988, 08/06/1992    Td (ADULT) 07/09/1998, 11/12/2008    Tdap 07/09/1998, 11/12/2008, 05/31/2021, 06/28/2021           Review of Systems   Constitutional:  Negative for activity change, appetite change, chills, diaphoresis, fatigue and fever.   HENT:  Negative for congestion, ear pain, sinus pain, tinnitus and trouble swallowing.    Eyes:  Negative for visual disturbance.   Respiratory:  Negative for cough, chest tightness, shortness of breath and wheezing.    Cardiovascular:  Negative for chest pain, palpitations and leg swelling.   Gastrointestinal:  Negative for abdominal distention, abdominal pain, blood in stool, constipation, diarrhea, nausea and vomiting.   Endocrine: Negative for cold intolerance, heat intolerance, polydipsia, polyphagia and polyuria.   Genitourinary:  Negative for decreased urine volume, dysuria, frequency, hematuria and urgency.   Musculoskeletal:  Negative for back pain, gait problem and neck pain.   Skin:  Negative for color change and rash.   Neurological:  Negative for dizziness, syncope, weakness, light-headedness, numbness and headaches.   Hematological:  Negative for adenopathy. Does not bruise/bleed easily.   Psychiatric/Behavioral:  Negative for behavioral problems, confusion, dysphoric mood and sleep disturbance. The patient is nervous/anxious.      Objective:     Vitals:    05/28/25 1431   BP: 110/67   BP Location: Left arm   Patient Position: Sitting   Pulse: 70   Resp: 18   SpO2: 99%   Weight: 63 kg (138 lb 12.8 oz)   Height: 5' 4" (1.626 m)       Physical " Exam  Vitals and nursing note reviewed.   Constitutional:       General: She is not in acute distress.     Appearance: Normal appearance. She is not diaphoretic.   HENT:      Head: Normocephalic and atraumatic.      Nose: Nose normal.      Mouth/Throat:      Mouth: Mucous membranes are moist.      Pharynx: Oropharynx is clear.   Eyes:      General:         Right eye: No discharge.         Left eye: No discharge.      Extraocular Movements: Extraocular movements intact.      Conjunctiva/sclera: Conjunctivae normal.      Pupils: Pupils are equal, round, and reactive to light.   Neck:      Thyroid: No thyromegaly or thyroid tenderness.   Cardiovascular:      Rate and Rhythm: Normal rate and regular rhythm.      Pulses: Normal pulses.      Heart sounds: Normal heart sounds.   Pulmonary:      Effort: Pulmonary effort is normal.      Breath sounds: Normal breath sounds. No stridor. No wheezing or rales.   Abdominal:      General: Bowel sounds are normal. There is no distension.      Palpations: Abdomen is soft.      Tenderness: There is no abdominal tenderness. There is no guarding.   Musculoskeletal:         General: No tenderness. Normal range of motion.      Cervical back: Normal range of motion and neck supple.      Right lower leg: No edema.      Left lower leg: No edema.   Lymphadenopathy:      Cervical: No cervical adenopathy.   Skin:     General: Skin is warm and dry.      Capillary Refill: Capillary refill takes less than 2 seconds.      Findings: No rash.   Neurological:      General: No focal deficit present.      Mental Status: She is alert and oriented to person, place, and time.   Psychiatric:         Mood and Affect: Affect normal. Mood is anxious.         Behavior: Behavior normal. Behavior is cooperative.         Thought Content: Thought content normal.         Judgment: Judgment normal.         Orders Only on 01/13/2025   Component Date Value Ref Range Status    Amphetamine Screen, Ur 01/13/2025 Negative   Negative Final    Barbiturate Screen, Ur 01/13/2025 Negative  Negative Final    Benzodiazepine Screen, Urine 01/13/2025 Positive (A)  Negative Final    BENZOYLECGONINE, URINE 01/13/2025 Negative  Negative Final    Methadone Screen, Urine 01/13/2025 Negative  Negative Final    Opiate Scrn, Ur 01/13/2025 Negative  Negative Final    PCP Scrn, Ur 01/13/2025 Negative  Negative Final    Cannabinoid Scrn, Ur 01/13/2025 Negative  Negative Final    pH, Urine (Tox) 01/13/2025 6.0  5.0 - 8.0 Final   Orders Only on 01/13/2025   Component Date Value Ref Range Status    Color, UA 01/13/2025 Colorless  Yellow Final    Appearance, UA 01/13/2025 Clear  Clear Final    pH, UA 01/13/2025 6.0  5.0 - 8.0 pH Final    Specific Gravity, UA 01/13/2025 1.009  1.005 - 1.030 Final    Protein, UA 01/13/2025 Negative  Negative mg/dL Final    Glucose, UA 01/13/2025 Negative  Normal mg/dL Final    Ketones, UA 01/13/2025 Negative  Negative mg/dL Final    Occult Blood UA 01/13/2025 Negative  Negative /uL Final    Nitrite, UA 01/13/2025 Negative  Negative Final    Bilirubin (UA) 01/13/2025 Negative  Negative mg/dL Final    Urobilinogen, UA 01/13/2025 Normal  Normal mg/dL Final    Leukocytes, UA 01/13/2025 Negative  Negative /uL Final   Orders Only on 01/13/2025   Component Date Value Ref Range Status    Cholesterol 01/13/2025 213 (H)  <200 mg/dL Final    Triglycerides 01/13/2025 86  0 - 149 mg/dL Final    HDL 01/13/2025 59  >39 mg/dL Final    LDL Cholesterol 01/13/2025 136.8  mg/dL Final    VLDL 01/13/2025 17  mg/dL Final    HDL/Cholesterol Ratio 01/13/2025 3.6  Ratio Final    Sodium 01/13/2025 135  131 - 143 mmol/L Final    Potassium 01/13/2025 3.5  3.5 - 5.1 mmol/L Final    Chloride 01/13/2025 104  98 - 107 mmol/L Final    CO2 01/13/2025 25  21 - 32 mmol/L Final    Anion Gap 01/13/2025 6.0  3.0 - 11.0 mmol/L Final    BUN 01/13/2025 15.0  7.0 - 18.0 mg/dL Final    Creatinine 01/13/2025 0.81  0.55 - 1.02 mg/dL Final    GFR ESTIMATION 01/13/2025 96   >60 Final    BUN/Creatinine Ratio 01/13/2025 18.51  Ratio Final    Glucose 01/13/2025 81  74 - 106 mg/dL Final    Calcium 01/13/2025 9.4  8.5 - 10.1 mg/dL Final    Total Bilirubin 01/13/2025 0.6  0.2 - 1.0 mg/dL Final    AST 01/13/2025 14 (L)  15 - 37 U/L Final    ALT 01/13/2025 20  13 - 56 U/L Final    Total Protein 01/13/2025 7.9  6.4 - 8.2 g/dL Final    Albumin 01/13/2025 4.4  3.4 - 5.0 g/dL Final    Globulin 01/13/2025 3.5  2.3 - 3.5 g/dL Final    Albumin/Globulin Ratio 01/13/2025 1.3  1.1 - 1.8 Ratio Final    Alkaline Phosphatase 01/13/2025 75  45 - 117 U/L Final    WBC 01/13/2025 6.2  4.5 - 10.0 10*3/uL Final    RBC 01/13/2025 4.84  4.20 - 5.40 10*6/uL Final    Hemoglobin 01/13/2025 13.7  12.0 - 16.0 g/dL Final    Hematocrit 01/13/2025 42.1  37.0 - 47.0 % Final    MCV 01/13/2025 87.0  80.0 - 99.0 fL Final    MCH 01/13/2025 28.3  27.0 - 32.0 pg Final    MCHC 01/13/2025 32.5  32.0 - 36.0 % Final    RDW RBC Auto-Rto 01/13/2025 12.6  0.0 - 15.5 % Final    Platelets 01/13/2025 355  130 - 400 10*3/uL Final    Neutrophils 01/13/2025 66.2  50 - 80 % Final    Lymphocytes 01/13/2025 26.5  20.0 - 45.0 % Final    Monocytes 01/13/2025 4.4  2 - 10 % Final    Eosinophils 01/13/2025 2.3  0 - 6 % Final    Basophils 01/13/2025 0.3  0 - 3 % Final    nRBC# 01/13/2025 0.0  0 - 0.2 % Final    Neutrophils Absolute 01/13/2025 4.1  1.4 - 7.0 10*3/uL Final         Assessment:      1. Annual physical exam    2. Anxiety    3. Other hyperlipidemia    4. Generalized anxiety disorder          Plan:     Annual physical exam  Comments:  Lab results reviewed and discussed in detail with patient's full understanding.    Anxiety  -     ALPRAZolam (XANAX) 0.5 MG tablet; Take 1 tablet (0.5 mg total) by mouth 2 (two) times daily as needed for Anxiety.  Dispense: 60 tablet; Refill: 2    Other hyperlipidemia    Generalized anxiety disorder  -     ALPRAZolam (XANAX) 0.5 MG tablet; Take 1 tablet (0.5 mg total) by mouth 2 (two) times daily as needed for  Anxiety.  Dispense: 60 tablet; Refill: 2         Current Medications[3]    Medications Discontinued During This Encounter   Medication Reason    amoxicillin (AMOXIL) 875 MG tablet Therapy completed    pantoprazole (PROTONIX) 20 MG tablet Patient no longer taking    pantoprazole (PROTONIX) 40 MG tablet Patient no longer taking    terbinafine HCL (LAMISIL) 250 mg tablet Patient no longer taking    fluticasone propionate (FLONASE) 50 mcg/actuation nasal spray Patient no longer taking    ALPRAZolam (XANAX) 0.25 MG tablet Change in Dosage Form       Health Maintenance   Topic Date Due    COVID-19 Vaccine (1 - 2024-25 season) Never done    Mammogram  08/21/2025    Influenza Vaccine (Season Ended) 09/01/2025    Cervical Cancer Screening  07/03/2026    TETANUS VACCINE  06/28/2031    RSV Vaccine (Age 60+ and Pregnant patients) (1 - 1-dose 75+ series) 07/10/2062    Hepatitis C Screening  Completed    HIV Screening  Completed    Lipid Panel  Completed    Pneumococcal Vaccines (Age 0-49)  Aged Out       Patient Instructions   RTC in 3 months for F/U or sooner if needed.    Patient Education       Yearly Physical for Adults   About this topic   Most people do not want to be sick. Having a checkup each year with your doctor is one way to help you stay healthy. You may need to see your doctor more or less often. How often you need to go to the doctor depends on your age. Your family and medical history also play a role in how often you need to go to the doctor. Going to see your doctor on a routine basis can help you find problems early or even before they start. This may make it easier to treat or cure your problem.  General   Your doctor will talk about many things during your checkup. Your doctor may ask about:  Your medical and family history.  All the drugs you are taking. Be sure to include all prescription, over the counter, and herbal supplements. Tell the doctor if you have any drug allergy. Bring a list of drugs you take  with you.  How you are feeling and if you are having any problems.  Risky behaviors like smoking, drinking alcohol, using illegal drugs, not wearing seatbelts, having unprotected sex, etc.  Your doctor will do a physical exam and may check your:  Height and weight  Blood pressure  Reflexes  Memory  Vision  Hearing  Your doctor may order:  Lab tests  ECG to check your heart rhythm  X-rays  Tests or treatments based on your exam  What lifestyle changes are needed?   Your doctor may suggest you make changes to your lifestyle at this visit. The doctor may talk with you about being more active or lowering stress levels. Ask your doctor what you need to do.  What drugs may be needed?   Your doctor may order drugs or vaccines to protect you from illnesses.  What changes to diet are needed?   Talk to your doctor to see if any changes are needed to your diet.  When do I need to call the doctor?   Call your doctor if you need to learn about any test results. Together you can make a plan for more care.  Helpful tips   Make a list of questions for your doctor before you go. This will help you remember to ask about any concerns. Write down any answers from your doctor so you can look over them after your visit.   Tell your doctor about any changes in your body or health since your last visit.  Ask your doctor about any screening tests you need.  Where can I learn more?   American Academy of [employer]icians  http://familydoctor.org/familydoctor/en/prevention-wellness/staying-healthy/healthy-living/preventive-services-for-healthy-living.printerview.html   Centers for Disease Control  http://www.cdc.gov/family/checkup/   Last Reviewed Date   2019-04-22  Consumer Information Use and Disclaimer   This information is not specific medical advice and does not replace information you receive from your health care provider. This is only a brief summary of general information. It does NOT include all information about conditions,  illnesses, injuries, tests, procedures, treatments, therapies, discharge instructions or life-style choices that may apply to you. You must talk with your health care provider for complete information about your health and treatment options. This information should not be used to decide whether or not to accept your health care providers advice, instructions or recommendations. Only your health care provider has the knowledge and training to provide advice that is right for you.  Copyright   Copyright © 2021 [employer]To[employer], [employer]. and its affiliates and/or licensors. All rights reserved.      Patient Education       Anxiety Discharge Instructions, Adult   About this topic   Anxiety can cause you to feel very worried. It can also cause physical symptoms like chest pain, stomach aches, or trouble sleeping. While mild anxiety is a normal response to stress, it can cause you problems in your everyday life. You may need follow-up care to help manage your anxiety. Anxiety happens in many forms, like:  Being scared all the time that something bad is going to happen. This is generalized anxiety.  Strong bursts of fear where your body has signs that may feel like a heart attack. This is called a panic attack.  Upsetting thoughts that happen often. There is a need to repeat doing certain things to help get rid of the anxiety caused by these thoughts. The thoughts or actions may be about checking on things, touching things, or worry about germs. This is an obsessive-compulsive disorder.  Strong fear of an object, place, or condition. This is a phobia.  Fear that others think bad things about you or being put down by other people. This is social anxiety.  Nightmares, flashbacks, staying away from people, or having panic attacks when reminded of a shocking or hurtful time or place from the past. This is post-traumatic stress.  Anxiety disorder may be treated in many ways. Some kinds of treatment have you talk about your beliefs, fears,  and worries. You may learn how certain thoughts or feelings can raise anxiety. You may also learn what steps to take to lower anxiety. Other kinds of treatment may have you look back on a hurtful event, sad memory, or something you are afraid of. The doctor will help you deal with the feelings that you may have. You may learn ways to cope with unwanted events or thoughts by looking at your fears in a way that feels safe.  What care is needed at home?   Ask your doctor what you need to do when you go home. Make sure you ask questions if you do not understand what the doctor says.  Set a time to talk with a counselor about your worries and feelings. This can help you with your anxiety.  Take care to follow all instructions when you take your medicines.  Limit alcohol and caffeine.  Learn ways to manage stress. Relaxation methods like reflection, deep breathing, and muscle relaxation may be helpful. Things like yoga, exercise, and maya chi are also good.  Talk about your feelings with family members and friends you trust. Talk to someone who can help you see how your thoughts at certain times may raise your anxiety.     What follow-up care is needed?   Your doctor may ask you to make visits to the office to check on your progress. Be sure to keep these visits.  What drugs may be needed?   The doctor may order drugs to help the physical signs of anxiety. Make sure that you take the drugs as taught to you by the doctor. Talk with your doctor about any side effects and ask how long you should take the drug.  Will physical activity be limited?   You may take part in physical activities. Some people are limited because of their anxiety or fear. Talk with your doctor about the right amount of activity for you.  What changes to diet are needed?   Eat a variety of healthy foods and limit drinks with caffeine. You should avoid alcohol, energy drinks, and over-the-counter stimulants.  What problems could happen?   If your anxiety  is not treated, it can result in:  Staying away from work or social events  Not being able to do everyday tasks  Keeping away from family and friends  What can be done to prevent this health problem?   Learn what events, people, or things upset you. Limit your contact with them.  Talk about your feelings. Talk to someone who can help you see how your thoughts at certain times may raise your anxiety.  Seek support from your friends and family. Find someone who calms you down. Ask if you can call them when you are getting anxious.  When do I need to call the doctor?   You feel you may harm yourself or someone else.  You can also call a mental health hotline for help.  You have any physical symptoms, such as chest pain, trouble breathing, or severe belly pain, that could be a sign of a serious problem.  If you are short of breath.  If you do not feel like you can be alone.  Teach Back: Helping You Understand   The Teach Back Method helps you understand the information we are giving you. After you talk with the staff, tell them in your own words what you learned. This helps to make sure the staff has described each thing clearly. It also helps to explain things that may have been confusing. Before going home, make sure you can do these:  I can tell you about my condition and the drugs I need to take.  I can tell you what may help lower my anxiety.  I can tell you what I will do if it is hard to breathe or I have chest pain.  I can tell you what I will do if I do not feel safe or cannot be alone.  Where can I learn more?   [facility]  https://www.chrissy.org/Learn-More/Mental-Health-Conditions/Anxiety-Disorders   National Health Service  https://www.nhs.uk/conditions/generalised-anxiety-disorder/symptoms/   [employer] [facility] ? [facility]  https://www.renny.nih.gov/health/relieving-stress-anxiety-mnhiihhqs-igovomypvn-gzqyuystbw   [employer] of Mental  Health  http://www.[facility].nih.gov/health/publications/anxiety-disorders/complete-index.shtml   Last Reviewed Date   2021-06-08  Consumer Information Use and Disclaimer   This information is not specific medical advice and does not replace information you receive from your health care provider. This is only a brief summary of general information. It does NOT include all information about conditions, illnesses, injuries, tests, procedures, treatments, therapies, discharge instructions or life-style choices that may apply to you. You must talk with your health care provider for complete information about your health and treatment options. This information should not be used to decide whether or not to accept your health care providers advice, instructions or recommendations. Only your health care provider has the knowledge and training to provide advice that is right for you.  Copyright   Copyright © 2021 UpToDate, Inc. and its affiliates and/or licensors. All rights reserved.      Patient Education       Low Cholesterol, Saturated Fat, and Trans Fat Diet   About this topic   Cholesterol, saturated fat, and trans fat are in many foods. These may raise your blood cholesterol levels. If your cholesterol is too high, this can cause health problems in your heart, liver, kidneys, and even your eyes. The key to lowering your risk of heart problems is to lower your bad fat intake.  Saturated fats and trans fats are the bad fats. These fats clog your arteries and raise your bad cholesterol. Saturated fats and trans fats are solid fats at room temperature. Saturated fats are animal fats. Trans fats are manmade fats. They add flavor to a lot of packaged foods. Staying away from saturated and trans fats will help your heart.  When you do eat foods with fat, make sure they have the good fats. Monounsaturated and polyunsaturated fats are good fats. These fats help raise your good cholesterol and protect your heart.  General   How to  "Lower Fat and Cholesterol in Your Diet   Read the labels of the foods you buy from the market to find out how much fat is present. Under 5% of total fat on a label means it is "low fat". Over 20% of total fat on a label means it is high fat.  Eat high fiber foods, like soluble fiber. This type of fiber helps lower cholesterol in the body. Choose oatmeal, fruits (like apples), beans, and nuts to get the most soluble fiber.  Eat foods high in omega-3 fatty acids like karla seeds, walnuts, salmon, tuna, trout, herring, flaxseed, and soybeans. These foods help keep the heart healthy.  Limit your bad fat and oil intake.  Stay away from butter, stick margarine, shortening, lard, and palm and coconut oil. Pick plant-based spreads instead.  Limit mayonnaise, salad dressings, gravies, and sauces, unless it is made from low-fat ingredients.  Limit chocolate.  Do not eat high-fat processed foods like hot dogs, jones, sausage, ham and other luncheon meats high in fat, and some frozen foods. Pick fish, chicken, turkey, and lean meats instead.  Eat more dried beans, lentils, and tofu to get your protein.  Do not eat organ meats, like liver.  Choose nonfat or low-fat milk, yogurt, and cheese.  Use light or fat-free cream cheese and sour cream.  Eat lots of fruits and vegetables.  Pick whole grain breads, cereals, pastas, and rice.  Do not eat snacks that are high in fats like granola, cookies, pies, pastries, doughnuts, and croissants.  Stay away from deep fried foods.  Help When Cooking   Remove the fat portion of meats and the skin from poultry before cooking.  Bake, broil, grill, poach, or roast poultry, fish, and lean meats.  Drain and throw away the fat that drains out of meat as you cook it.  Try to add little or no fat to foods.  Use olive or canola oil for cooking or baking.  Steam your vegetables.  Use herbs or no-oil marinades to flavor foods.         Who should use this diet?   This diet is for people who are at high " risk of getting health problems like heart disease, high blood pressure, diabetes, and others. This diet is also good for all people to follow to keep your heart healthy.  What foods are good to eat?   Foods with good fats are:  Canola, peanut, and olive oil  Safflower, soybean, and corn oil  Walnuts, almonds, cashews, and peanuts  Pumpkin and sunflower seeds  [place] and tuna  Tofu  Soymilk  Avocado  What foods should be limited or avoided?   Stay away from these types of foods that have saturated fats:  Whole fat dairy products like cheese, ice cream, whole milk, and cream  Palm and coconut oils  High-fat meats like beef, lamb, poultry with the skin, jones, and sausage  Butter and lard  Stay away from these types of foods that may have trans fat:  Cookies, cakes, candy, doughnuts, baked goods, muffins, pizza dough, and pie crusts that are packaged  Fried foods  Frozen dinners  Chips and crackers  Microwave popcorn  Stick margarine and vegetable shortenings  Helpful tips   To help stay away from saturated fat:  Pick lean cuts of meat  Take the skin off chicken and turkey or pick skinless  Pick low-fat cheese, milk, and ice cream  Use liquid oils when cooking and baking, such as olive oil and canola oil  To help stay away from trans fat:  Look at your labels. Choose foods with 0% trans fat. Read the ingredient list. Avoid foods with partially hydrogenated oil in the ingredient list. This means there is trans fat in the product.  Where can I learn more?   American Heart Association   http://www.heart.org/HEARTORG/Conditions/Cholesterol/PreventionTreatmentofHighCholesterol/Know-Your-Fats_[facility]_305628_Article.jsp   Last Reviewed Date   2021-10-05  Consumer Information Use and Disclaimer   This information is not specific medical advice and does not replace information you receive from your health care provider. This is only a brief summary of general information. It does NOT include all information about conditions,  illnesses, injuries, tests, procedures, treatments, therapies, discharge instructions or life-style choices that may apply to you. You must talk with your health care provider for complete information about your health and treatment options. This information should not be used to decide whether or not to accept your health care providers advice, instructions or recommendations. Only your health care provider has the knowledge and training to provide advice that is right for you.   Copyright   Copyright © 2021 [employer] and its affiliates and/or licensors. All rights reserved.      Risks, benefits, and alternatives discussed with patient, Patient verbalized understanding of discussed plan of care. Asked patient if any further questions, answered no.    Future Appointments   Date Time Provider Department Center   7/18/2025 10:00 AM Jcarlos Larios MD [facility] [id number] [facility]   8/21/2025  3:00 PM Antonina Campbell NP [facility] [id number] [facility] Campbell NP         [1]   Social History  Tobacco Use    Smoking status: Never    Smokeless tobacco: Never   Substance Use Topics    Alcohol use: Never    Drug use: Never   [2]   Patient Active Problem List  Diagnosis    Anxiety disorder    Attention deficit hyperactivity disorder, predominantly inattentive type    Iron deficiency anemia    Other specified hypothyroidism   [3]   Current Outpatient Medications   Medication Sig Dispense Refill    ALPRAZolam (XANAX) 0.5 MG tablet Take 1 tablet (0.5 mg total) by mouth 2 (two) times daily as needed for Anxiety. 60 tablet 2     No current facility-administered medications for this visit.

## 2025-06-17 ENCOUNTER — PATIENT OUTREACH (OUTPATIENT)
Dept: ADMINISTRATIVE | Facility: HOSPITAL | Age: 40
End: 2025-06-17
Payer: MEDICAID

## 2025-06-29 ENCOUNTER — PATIENT MESSAGE (OUTPATIENT)
Dept: OBSTETRICS AND GYNECOLOGY | Facility: CLINIC | Age: 40
End: 2025-06-29
Payer: MEDICAID

## 2025-07-06 ENCOUNTER — PATIENT MESSAGE (OUTPATIENT)
Dept: OBSTETRICS AND GYNECOLOGY | Facility: CLINIC | Age: 40
End: 2025-07-06
Payer: MEDICAID

## 2025-07-07 DIAGNOSIS — O20.9 BLEEDING IN EARLY PREGNANCY: Primary | ICD-10-CM

## 2025-07-08 LAB — B-HCG SERPL-ACNC: 30 MIU/ML

## 2025-07-17 ENCOUNTER — OFFICE VISIT (OUTPATIENT)
Dept: OBSTETRICS AND GYNECOLOGY | Facility: CLINIC | Age: 40
End: 2025-07-17
Payer: MEDICAID

## 2025-07-17 VITALS
SYSTOLIC BLOOD PRESSURE: 128 MMHG | BODY MASS INDEX: 24.2 KG/M2 | HEART RATE: 111 BPM | DIASTOLIC BLOOD PRESSURE: 74 MMHG | WEIGHT: 141 LBS

## 2025-07-17 DIAGNOSIS — Z01.419 ENCOUNTER FOR GYNECOLOGICAL EXAMINATION WITHOUT ABNORMAL FINDING: Primary | ICD-10-CM

## 2025-07-17 PROCEDURE — 3074F SYST BP LT 130 MM HG: CPT | Mod: CPTII,,, | Performed by: OBSTETRICS & GYNECOLOGY

## 2025-07-17 PROCEDURE — 99395 PREV VISIT EST AGE 18-39: CPT | Mod: S$PBB,,, | Performed by: OBSTETRICS & GYNECOLOGY

## 2025-07-17 PROCEDURE — 3008F BODY MASS INDEX DOCD: CPT | Mod: CPTII,,, | Performed by: OBSTETRICS & GYNECOLOGY

## 2025-07-17 PROCEDURE — 3078F DIAST BP <80 MM HG: CPT | Mod: CPTII,,, | Performed by: OBSTETRICS & GYNECOLOGY

## 2025-07-17 PROCEDURE — 3044F HG A1C LEVEL LT 7.0%: CPT | Mod: CPTII,,, | Performed by: OBSTETRICS & GYNECOLOGY

## 2025-07-17 NOTE — PROGRESS NOTES
Subjective:       Patient ID: Sari Fernandez is a 40 y.o. female.    Chief Complaint:  Well Woman      History of Present Illness  Pt here for gyn annual.  History and past labs reviewed with patient.    Complaints none  Recent chemical pregn  Trying to get preg- talked about this      Review of Systems  Review of Systems   Constitutional:  Negative for chills and fever.   Respiratory:  Negative for shortness of breath.    Cardiovascular:  Negative for chest pain.   Gastrointestinal:  Negative for abdominal pain, blood in stool, constipation, diarrhea, nausea, vomiting and reflux.   Genitourinary:  Negative for dysmenorrhea, dyspareunia, dysuria, hematuria, hot flashes, menorrhagia, menstrual problem, pelvic pain, vaginal bleeding, vaginal discharge, postcoital bleeding and vaginal dryness.   Musculoskeletal:  Negative for arthralgias and joint swelling.   Integumentary:  Negative for rash, hair changes, breast mass, nipple discharge and breast skin changes.   Psychiatric/Behavioral:  Negative for depression. The patient is not nervous/anxious.    Breast: Negative for asymmetry, lump, mass, nipple discharge and skin changes          Objective:     Vitals:    07/17/25 1017   BP: 128/74   Pulse: (!) 111   Weight: 64 kg (141 lb)      Female chaperone present   Physical Exam:   Constitutional: She appears well-developed and well-nourished. No distress.    HENT:   Head: Normocephalic and atraumatic.    Eyes: Conjunctivae and EOM are normal.    Neck: No tracheal deviation present. No thyromegaly present.    Cardiovascular:       Exam reveals no clubbing, no cyanosis and no edema.        Pulmonary/Chest: Effort normal. No respiratory distress.        Abdominal: Soft. She exhibits no distension and no mass. There is no abdominal tenderness. There is no rebound and no guarding. No hernia.     Genitourinary:    Vagina, uterus and rectum normal.      Pelvic exam was performed with patient supine.   There is no rash,  tenderness, lesion or injury on the right labia. There is no rash, tenderness, lesion or injury on the left labia. Cervix is normal. Right adnexum displays no mass, no tenderness and no fullness. Left adnexum displays no mass, no tenderness and no fullness.                Skin: She is not diaphoretic. No cyanosis. Nails show no clubbing.            Assessment:        1. Encounter for gynecological examination without abnormal finding                Plan:      Pap  Desires pregn- start pnv

## 2025-07-22 ENCOUNTER — PATIENT MESSAGE (OUTPATIENT)
Dept: OBSTETRICS AND GYNECOLOGY | Facility: CLINIC | Age: 40
End: 2025-07-22
Payer: MEDICAID

## 2025-07-28 ENCOUNTER — PATIENT MESSAGE (OUTPATIENT)
Dept: OBSTETRICS AND GYNECOLOGY | Facility: CLINIC | Age: 40
End: 2025-07-28
Payer: MEDICAID

## 2025-08-04 ENCOUNTER — OFFICE VISIT (OUTPATIENT)
Dept: OBSTETRICS AND GYNECOLOGY | Facility: CLINIC | Age: 40
End: 2025-08-04
Payer: MEDICAID

## 2025-08-04 VITALS
WEIGHT: 141 LBS | DIASTOLIC BLOOD PRESSURE: 87 MMHG | BODY MASS INDEX: 24.2 KG/M2 | SYSTOLIC BLOOD PRESSURE: 125 MMHG | HEART RATE: 89 BPM

## 2025-08-04 DIAGNOSIS — R87.611 ATYPICAL SQUAMOUS CELLS CANNOT EXCLUDE HIGH GRADE SQUAMOUS INTRAEPITHELIAL LESION ON CYTOLOGIC SMEAR OF CERVIX (ASC-H): Primary | ICD-10-CM

## 2025-08-04 PROCEDURE — 99499 UNLISTED E&M SERVICE: CPT | Mod: S$PBB,,, | Performed by: OBSTETRICS & GYNECOLOGY

## 2025-08-04 PROCEDURE — 57452 EXAM OF CERVIX W/SCOPE: CPT | Mod: S$PBB,,, | Performed by: OBSTETRICS & GYNECOLOGY

## 2025-08-04 NOTE — PROCEDURES
Colposcopy    Date/Time: 8/4/2025 9:30 AM    Performed by: Jcarlos Larios MD  Authorized by: Jcarlos Larios MD      Colposcopy Site:  Cervix  Position:  Supine  Acrowhite Lesion: No    Atypical Vessels: No    Transformation Zone Adequate?: Yes    Biopsy?: No    ECC Performed?: No    LEEP Performed?: No     Patient tolerated the procedure well with no immediate complications.   Post-operative instructions were provided for the patient.   Patient was discharged and will follow up if any complications occur

## 2025-08-17 ENCOUNTER — PATIENT MESSAGE (OUTPATIENT)
Dept: PRIMARY CARE CLINIC | Facility: CLINIC | Age: 40
End: 2025-08-17
Payer: MEDICAID

## 2025-08-17 DIAGNOSIS — L08.9 BACTERIAL SKIN INFECTION: Primary | ICD-10-CM

## 2025-08-17 DIAGNOSIS — B96.89 BACTERIAL SKIN INFECTION: Primary | ICD-10-CM

## 2025-08-17 DIAGNOSIS — L08.9 SKIN INFLAMMATION: ICD-10-CM

## 2025-08-18 RX ORDER — MUPIROCIN 20 MG/G
OINTMENT TOPICAL 3 TIMES DAILY
Qty: 30 G | Refills: 1 | Status: SHIPPED | OUTPATIENT
Start: 2025-08-18

## 2025-08-18 RX ORDER — DOXYCYCLINE HYCLATE 100 MG
100 TABLET ORAL 2 TIMES DAILY
Qty: 14 TABLET | Refills: 0 | Status: SHIPPED | OUTPATIENT
Start: 2025-08-18 | End: 2025-08-18 | Stop reason: SINTOL

## 2025-08-18 RX ORDER — CEPHALEXIN 500 MG/1
500 CAPSULE ORAL 2 TIMES DAILY
Qty: 14 CAPSULE | Refills: 0 | Status: SHIPPED | OUTPATIENT
Start: 2025-08-18 | End: 2025-08-25

## 2025-08-31 ENCOUNTER — PATIENT MESSAGE (OUTPATIENT)
Dept: PRIMARY CARE CLINIC | Facility: CLINIC | Age: 40
End: 2025-08-31
Payer: MEDICAID

## 2025-08-31 DIAGNOSIS — T78.40XD ALLERGY, SUBSEQUENT ENCOUNTER: Primary | ICD-10-CM
